# Patient Record
Sex: FEMALE | Race: WHITE | NOT HISPANIC OR LATINO | Employment: UNEMPLOYED | URBAN - METROPOLITAN AREA
[De-identification: names, ages, dates, MRNs, and addresses within clinical notes are randomized per-mention and may not be internally consistent; named-entity substitution may affect disease eponyms.]

---

## 2017-07-04 RX ORDER — SERTRALINE HYDROCHLORIDE 100 MG/1
TABLET, FILM COATED ORAL
Qty: 30 TABLET | Refills: 2 | Status: SHIPPED | OUTPATIENT
Start: 2017-07-04 | End: 2018-12-05

## 2018-12-05 ENCOUNTER — PROCEDURE VISIT (OUTPATIENT)
Dept: OBSTETRICS AND GYNECOLOGY | Facility: CLINIC | Age: 28
End: 2018-12-05

## 2018-12-05 ENCOUNTER — OFFICE VISIT (OUTPATIENT)
Dept: OBSTETRICS AND GYNECOLOGY | Facility: CLINIC | Age: 28
End: 2018-12-05

## 2018-12-05 VITALS
WEIGHT: 140 LBS | HEIGHT: 66 IN | DIASTOLIC BLOOD PRESSURE: 70 MMHG | SYSTOLIC BLOOD PRESSURE: 100 MMHG | BODY MASS INDEX: 22.5 KG/M2

## 2018-12-05 DIAGNOSIS — F32.A DEPRESSION AFFECTING PREGNANCY IN FIRST TRIMESTER, ANTEPARTUM: ICD-10-CM

## 2018-12-05 DIAGNOSIS — R55 NEAR SYNCOPE: ICD-10-CM

## 2018-12-05 DIAGNOSIS — N91.2 AMENORRHEA: Primary | ICD-10-CM

## 2018-12-05 DIAGNOSIS — F41.9 ANXIETY: ICD-10-CM

## 2018-12-05 DIAGNOSIS — O36.80X0 ENCOUNTER TO DETERMINE FETAL VIABILITY OF PREGNANCY, SINGLE OR UNSPECIFIED FETUS: Primary | ICD-10-CM

## 2018-12-05 DIAGNOSIS — Z32.01 POSITIVE URINE PREGNANCY TEST: ICD-10-CM

## 2018-12-05 DIAGNOSIS — O21.9 NAUSEA AND VOMITING DURING PREGNANCY PRIOR TO 22 WEEKS GESTATION: ICD-10-CM

## 2018-12-05 DIAGNOSIS — F19.11 H/O DRUG ABUSE (HCC): ICD-10-CM

## 2018-12-05 DIAGNOSIS — O99.341 DEPRESSION AFFECTING PREGNANCY IN FIRST TRIMESTER, ANTEPARTUM: ICD-10-CM

## 2018-12-05 PROCEDURE — 76801 OB US < 14 WKS SINGLE FETUS: CPT | Performed by: NURSE PRACTITIONER

## 2018-12-05 PROCEDURE — 99214 OFFICE O/P EST MOD 30 MIN: CPT | Performed by: NURSE PRACTITIONER

## 2018-12-05 RX ORDER — ONDANSETRON 4 MG/1
4 TABLET, ORALLY DISINTEGRATING ORAL EVERY 8 HOURS PRN
Qty: 30 TABLET | Refills: 1 | Status: SHIPPED | OUTPATIENT
Start: 2018-12-05 | End: 2019-02-19

## 2018-12-05 RX ORDER — DIPHENHYDRAMINE HYDROCHLORIDE 25 MG/1
25 CAPSULE ORAL NIGHTLY
Qty: 30 TABLET | Refills: 1 | Status: SHIPPED | OUTPATIENT
Start: 2018-12-05 | End: 2018-12-20 | Stop reason: SDUPTHER

## 2018-12-05 NOTE — PROGRESS NOTES
Confirmation of pregnancy     .  Chief Complaint   Patient presents with   • Amenorrhea   • Abdominal Pain     spotting 2 weeks ago         Delaney Duron is being seen today for evaluation of absence of menses. She is a 28 y.o. . LNMP: uncertain.  She does not remember her LNMP. She is taking PNV.   She has a history of Hep C, has not had a recent eval of her status. 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    Current obstetric complaints : nausea, breast tenderness, fatigue, dizziness, constipation  Prior obstetric issues, potential pregnancy concerns: Hep C   Current medications: PNV   Family history of genetic issues (includes FOB): denies   Prior infections concerning in pregnancy (Rash, fever in last 2 weeks): denies  Varicella Hx -as child  Flu vaccine- flu vaccine today   Last pap- needs one  Prior testing for Cystic Fibrosis Carrier or Sickle Cell Trait- She is neg for CF carrier  Prepregnancy BMI - Body mass index is 22.77 kg/m².    Past Medical History:   Diagnosis Date   • Hepatitis C        History reviewed. No pertinent surgical history.      Current Outpatient Medications:   •  acetaminophen (TYLENOL) 500 MG tablet, Take 500 mg by mouth Every 6 (Six) Hours As Needed for mild pain (1-3)., Disp: , Rfl:   •  ondansetron ODT (ZOFRAN ODT) 8 MG disintegrating tablet, Take 1 tablet by mouth Every 8 (Eight) Hours As Needed for nausea or vomiting., Disp: 10 tablet, Rfl: 0  •  ondansetron ODT (ZOFRAN-ODT) 4 MG disintegrating tablet, Take 4 mg by mouth Every 8 (Eight) Hours As Needed for nausea or vomiting., Disp: , Rfl:   •  sertraline (ZOLOFT) 100 MG tablet, take 1 tablet by mouth once daily, Disp: 30 tablet, Rfl: 2    No Known Allergies    Social History     Socioeconomic History   • Marital status: Single     Spouse name: Not on file   • Number of children: Not on file   • Years of education: Not on file   • Highest education level: Not on file   Social Needs   • Financial resource  "strain: Not on file   • Food insecurity - worry: Not on file   • Food insecurity - inability: Not on file   • Transportation needs - medical: Not on file   • Transportation needs - non-medical: Not on file   Occupational History   • Not on file   Tobacco Use   • Smoking status: Former Smoker     Packs/day: 0.50     Last attempt to quit: 2018     Years since quittin.0   • Smokeless tobacco: Never Used   Substance and Sexual Activity   • Alcohol use: No     Frequency: Never   • Drug use: Yes     Types: Heroin, Methamphetamines     Comment: 2 years clean    • Sexual activity: Yes     Partners: Male   Other Topics Concern   • Not on file   Social History Narrative   • Not on file       Family History   Problem Relation Age of Onset   • No Known Problems Father    • No Known Problems Mother        Review of systems     A comprehensive review of systems was negative except for: Constitutional: positive for fatigue  Cardiovascular: positive for near-syncope  Gastrointestinal: positive for constipation and nausea  Integument/breast: positive for breast tenderness     Objective    /70   Ht 167 cm (65.75\")   Wt 63.5 kg (140 lb)   LMP 2018 (Approximate)   BMI 22.77 kg/m²     Physical Exam   Constitutional: She is oriented to person, place, and time. She appears well-nourished.   Pulmonary/Chest: Effort normal. No respiratory distress.   Abdominal: Soft. She exhibits no distension.   Musculoskeletal: Normal range of motion.   Neurological: She is alert and oriented to person, place, and time.   Skin: Skin is warm and dry.   Psychiatric: She has a normal mood and affect. Her behavior is normal.   Vitals reviewed.      Assessment    1) Amenorrhea- + UPT in office. LNMP uncertain. US IMP: SIngle, viable IUP @ 8.4 weeks.   2) Patient's Body mass index is 22.77 kg/m². BMI is within normal parameters. No follow-up required.  3) H/O depression and anxiety- Requesting to start Zoloft. ERX zoloft 50mg.   4) H/O " drug abuse- Reports clean x 2 years.   5) Nausea- ERX Vit B6, unisom and zofran. Enc small frequent meals.   6) Constipation- ENc increased h20. Safe med list provided.   7) Junior syncope- Enc increase h20 and salt in her diet. Will ref to cardiology if symptoms continue.      Plan  Oriented to practice.  Reviewed routine and expected prenatal care.   Continue prenatal vitamins  Problem list reviewed and updated.  Zika (travel restrictions/ok to use insect repellant), not to changing cat litter, food restrictions, avoidance of alcohol, tobacco and drugs and saunas/hot tubs.     All questions answered.     Encounter Diagnoses   Name Primary?   • Amenorrhea Yes         Diagnoses and all orders for this visit:    Amenorrhea  -     POC Pregnancy, Urine      RTO in 1-2 weeks for new OB exam, labs and dating ultrasound.     Sherly France, RADHA  12/5/2018  11:14 AM

## 2018-12-20 ENCOUNTER — INITIAL PRENATAL (OUTPATIENT)
Dept: OBSTETRICS AND GYNECOLOGY | Facility: CLINIC | Age: 28
End: 2018-12-20

## 2018-12-20 VITALS — DIASTOLIC BLOOD PRESSURE: 74 MMHG | WEIGHT: 135.1 LBS | BODY MASS INDEX: 21.97 KG/M2 | SYSTOLIC BLOOD PRESSURE: 108 MMHG

## 2018-12-20 DIAGNOSIS — Z01.419 PAP SMEAR, LOW-RISK: ICD-10-CM

## 2018-12-20 DIAGNOSIS — O99.341 DEPRESSION AFFECTING PREGNANCY IN FIRST TRIMESTER, ANTEPARTUM: ICD-10-CM

## 2018-12-20 DIAGNOSIS — F32.A DEPRESSION AFFECTING PREGNANCY IN FIRST TRIMESTER, ANTEPARTUM: ICD-10-CM

## 2018-12-20 DIAGNOSIS — Z34.91 INITIAL OBSTETRIC VISIT IN FIRST TRIMESTER: Primary | ICD-10-CM

## 2018-12-20 DIAGNOSIS — F19.11 H/O DRUG ABUSE (HCC): ICD-10-CM

## 2018-12-20 DIAGNOSIS — Z11.51 SPECIAL SCREENING EXAMINATION FOR HUMAN PAPILLOMAVIRUS (HPV): ICD-10-CM

## 2018-12-20 DIAGNOSIS — O21.9 NAUSEA AND VOMITING DURING PREGNANCY PRIOR TO 22 WEEKS GESTATION: ICD-10-CM

## 2018-12-20 DIAGNOSIS — F41.9 ANXIETY: ICD-10-CM

## 2018-12-20 DIAGNOSIS — Z36.9 ENCOUNTER FOR ANTENATAL SCREENING, UNSPECIFIED: ICD-10-CM

## 2018-12-20 PROCEDURE — 99214 OFFICE O/P EST MOD 30 MIN: CPT | Performed by: NURSE PRACTITIONER

## 2018-12-20 RX ORDER — DIPHENHYDRAMINE HYDROCHLORIDE 25 MG/1
25 CAPSULE ORAL 2 TIMES DAILY
Qty: 60 TABLET | Refills: 1 | Status: SHIPPED | OUTPATIENT
Start: 2018-12-20 | End: 2019-02-19

## 2018-12-23 LAB
C TRACH RRNA CVX QL NAA+PROBE: NEGATIVE
CONV .: NORMAL
CYTOLOGIST CVX/VAG CYTO: NORMAL
CYTOLOGY CVX/VAG DOC THIN PREP: NORMAL
DX ICD CODE: NORMAL
HIV 1 & 2 AB SER-IMP: NORMAL
N GONORRHOEA RRNA CVX QL NAA+PROBE: NEGATIVE
OTHER STN SPEC: NORMAL
PATH REPORT.FINAL DX SPEC: NORMAL
STAT OF ADQ CVX/VAG CYTO-IMP: NORMAL
T VAGINALIS RRNA SPEC QL NAA+PROBE: NEGATIVE

## 2018-12-26 ENCOUNTER — LAB (OUTPATIENT)
Dept: LAB | Facility: HOSPITAL | Age: 28
End: 2018-12-26

## 2018-12-26 DIAGNOSIS — Z36.9 ENCOUNTER FOR ANTENATAL SCREENING, UNSPECIFIED: ICD-10-CM

## 2018-12-26 PROBLEM — Z11.51 SPECIAL SCREENING EXAMINATION FOR HUMAN PAPILLOMAVIRUS (HPV): Status: ACTIVE | Noted: 2018-12-26

## 2018-12-26 PROBLEM — Z34.91 INITIAL OBSTETRIC VISIT IN FIRST TRIMESTER: Status: ACTIVE | Noted: 2018-12-26

## 2018-12-26 PROBLEM — Z01.419 PAP SMEAR, LOW-RISK: Status: ACTIVE | Noted: 2018-12-26

## 2018-12-26 LAB
ABO GROUP BLD: NORMAL
ABO GROUP BLD: NORMAL
BASOPHILS # BLD AUTO: 0.04 10*3/MM3 (ref 0–0.2)
BASOPHILS NFR BLD AUTO: 0.5 % (ref 0–2)
BLD GP AB SCN SERPL QL: NEGATIVE
DEPRECATED RDW RBC AUTO: 42.9 FL (ref 37–54)
EOSINOPHIL # BLD AUTO: 0.11 10*3/MM3 (ref 0.1–0.3)
EOSINOPHIL NFR BLD AUTO: 1.4 % (ref 0–4)
ERYTHROCYTE [DISTWIDTH] IN BLOOD BY AUTOMATED COUNT: 13.7 % (ref 11.5–14.5)
HBA1C MFR BLD: 5.1 % (ref 4.8–5.6)
HCT VFR BLD AUTO: 40.2 % (ref 37–47)
HGB BLD-MCNC: 13.3 G/DL (ref 12–16)
IMM GRANULOCYTES # BLD AUTO: 0.01 10*3/MM3 (ref 0–0.03)
IMM GRANULOCYTES NFR BLD AUTO: 0.1 % (ref 0–0.5)
LYMPHOCYTES # BLD AUTO: 2.35 10*3/MM3 (ref 0.6–4.8)
LYMPHOCYTES NFR BLD AUTO: 30.6 % (ref 20–45)
MCH RBC QN AUTO: 28.4 PG (ref 27–31)
MCHC RBC AUTO-ENTMCNC: 33.1 G/DL (ref 31–37)
MCV RBC AUTO: 85.9 FL (ref 81–99)
MONOCYTES # BLD AUTO: 0.49 10*3/MM3 (ref 0–1)
MONOCYTES NFR BLD AUTO: 6.4 % (ref 3–8)
NEUTROPHILS # BLD AUTO: 4.68 10*3/MM3 (ref 1.5–8.3)
NEUTROPHILS NFR BLD AUTO: 61 % (ref 45–70)
NRBC BLD AUTO-RTO: 0 /100 WBC (ref 0–0)
PLATELET # BLD AUTO: 235 10*3/MM3 (ref 140–500)
PMV BLD AUTO: 11.3 FL (ref 7.4–10.4)
RBC # BLD AUTO: 4.68 10*6/MM3 (ref 4.2–5.4)
RH BLD: POSITIVE
RH BLD: POSITIVE
T&S EXPIRATION DATE: NORMAL
WBC NRBC COR # BLD: 7.68 10*3/MM3 (ref 4.8–10.8)

## 2018-12-26 PROCEDURE — 86803 HEPATITIS C AB TEST: CPT | Performed by: NURSE PRACTITIONER

## 2018-12-26 PROCEDURE — 80081 OBSTETRIC PANEL INC HIV TSTG: CPT | Performed by: NURSE PRACTITIONER

## 2018-12-26 PROCEDURE — 86900 BLOOD TYPING SEROLOGIC ABO: CPT

## 2018-12-26 PROCEDURE — 83036 HEMOGLOBIN GLYCOSYLATED A1C: CPT

## 2018-12-26 PROCEDURE — 86901 BLOOD TYPING SEROLOGIC RH(D): CPT

## 2018-12-26 PROCEDURE — 36415 COLL VENOUS BLD VENIPUNCTURE: CPT

## 2018-12-26 NOTE — PROGRESS NOTES
Initial ob visit     Chief Complaint   Patient presents with   • Initial Prenatal Visit       Delaney Duron is being seen today for her first obstetrical visit.  She is a 28 y.o.    11w4d gestation.     #: 1, Date: None, Sex: None, Weight: None, GA: None, Delivery: None, Apgar1: None, Apgar5: None, Living: None, Birth Comments: None    #: 2, Date: None, Sex: None, Weight: None, GA: None, Delivery: None, Apgar1: None, Apgar5: None, Living: None, Birth Comments: None    #: 3, Date: None, Sex: None, Weight: None, GA: None, Delivery: None, Apgar1: None, Apgar5: None, Living: None, Birth Comments: None    Current obstetric complaints : nausea  Prior obstetric issues, potential pregnancy concerns: Hep C   Current medications: PNV   Family history of genetic issues (includes FOB): denies   Prior infections concerning in pregnancy (Rash, fever in last 2 weeks): denies  Varicella Hx -as child  Flu vaccine- flu vaccine completed  Last pap- needs one  Prior testing for Cystic Fibrosis Carrier or Sickle Cell Trait- She is neg for CF carrier  Prepregnancy BMI: Body mass index is 21.97 kg/m².    Past Medical History:   Diagnosis Date   • Hepatitis C        No past surgical history on file.      Current Outpatient Medications:   •  acetaminophen (TYLENOL) 500 MG tablet, Take 500 mg by mouth Every 6 (Six) Hours As Needed for mild pain (1-3)., Disp: , Rfl:   •  doxylamine (UNISOM) 25 MG tablet, Take 1 tablet by mouth At Night As Needed for Sleep., Disp: 30 tablet, Rfl: 1  •  ondansetron ODT (ZOFRAN-ODT) 4 MG disintegrating tablet, Take 1 tablet by mouth Every 8 (Eight) Hours As Needed for Nausea or Vomiting., Disp: 30 tablet, Rfl: 1  •  sertraline (ZOLOFT) 50 MG tablet, Take 1 tablet by mouth Daily., Disp: 30 tablet, Rfl: 2  •  vitamin B-6 (PYRIDOXINE) 25 MG tablet, Take 1 tablet by mouth 2 (Two) Times a Day., Disp: 60 tablet, Rfl: 1    No Known Allergies    Social History     Socioeconomic History   • Marital status: Single      Spouse name: Not on file   • Number of children: Not on file   • Years of education: Not on file   • Highest education level: Not on file   Social Needs   • Financial resource strain: Not on file   • Food insecurity - worry: Not on file   • Food insecurity - inability: Not on file   • Transportation needs - medical: Not on file   • Transportation needs - non-medical: Not on file   Occupational History   • Not on file   Tobacco Use   • Smoking status: Former Smoker     Packs/day: 0.50     Last attempt to quit: 2018     Years since quittin.0   • Smokeless tobacco: Never Used   Substance and Sexual Activity   • Alcohol use: No     Frequency: Never   • Drug use: Yes     Types: Heroin, Methamphetamines     Comment: 2 years clean    • Sexual activity: Yes     Partners: Male   Other Topics Concern   • Not on file   Social History Narrative   • Not on file       Family History   Problem Relation Age of Onset   • No Known Problems Father    • No Known Problems Mother        Review of systems     A comprehensive review of systems was negative except for: Gastrointestinal: positive for nausea     Objective    /74   Wt 61.3 kg (135 lb 1.6 oz)   LMP 2018 (Approximate)   BMI 21.97 kg/m²       General Appearance:    Alert, cooperative, in no acute distress, habitus normal   Head:    Not examined   Eyes:           Not examined   Ears:  Not examined       Neck:  No thyroid enlargement or nodules present   Back:     No kyphosis present, no scoliosis present,                       Lungs:     Clear to auscultation,respirations regular, even and                   unlabored    Heart:    Regular rhythm and normal rate, normal S1 and S2, no            murmur, no gallop, no rub, no click   Breast Exam:    No masses, No nipple discharge   Abdomen:     Normal bowel sounds, no masses, no organomegaly, soft        non-tender, non-distended, no guarding, no rebound                 tenderness   Genitalia:    Vulva - No  masses, no atrophy, no lesions    Vagina - No discharge, No bleeding    Cervix - No Lesions, closed. Pap collected.      Uterus - Consistent with 10 weeks.     Adnexa - No masses, non tender       Extremities:   Moves all extremities well, no edema, no cyanosis, no              redness   Pulses:   Pulses palpable and equal bilaterally   Skin:   No bleeding, bruising or rash   Lymph nodes:   No palpable adenopathy   Neurologic:   Sensation intact, A&O times 3      Assessment    1) Pregnancy at 10w5d   2) Patient's Body mass index is 21.97 kg/m². BMI is within normal parameters. No follow-up required.  3) S/P flu vaccine  4) H/O Drug abuse- Denies use at this time. Await UDS.  5) Nausea- Enc small frequent meals, vit b6, betsey and unisom   6) H/O depression and anxiety- Taking Zoloft 50mg          Plan  New OB exam completed. New OB bag provided.   Initial labs collected. Patient desires [id number]. Declines  cystic fibrosis screening.    Continue taking Prenatal vitamins  Problem list reviewed and updated  Reviewed routine prenatal care with the office to include but not limited to expected weight gain during pregnancy, Tylenol products are fine, avoid aspirin and ibuprofen; Zika (travel restrictions/ok to use insect repellant); not to change cat litter; food restrictions; avoidance of alcohol, tobacco, drugs and saunas/hot tubs.   All questions answered.     RTO 4 weeks     Sherly France, APRN  120/20/2018

## 2018-12-27 LAB
BACTERIA UR CULT: NORMAL
BACTERIA UR CULT: NORMAL
DRUGS UR: NORMAL
HBV SURFACE AG SERPL QL IA: NEGATIVE
HCV AB S/CO SERPL IA: >11 S/CO RATIO (ref 0–0.9)
HIV 1+2 AB+HIV1 P24 AG SERPL QL IA: NON REACTIVE
RPR SER QL: NON REACTIVE
RUBV IGG SERPL IA-ACNC: 6.68 INDEX

## 2019-01-22 ENCOUNTER — ROUTINE PRENATAL (OUTPATIENT)
Dept: OBSTETRICS AND GYNECOLOGY | Facility: CLINIC | Age: 29
End: 2019-01-22

## 2019-01-22 VITALS — WEIGHT: 135 LBS | BODY MASS INDEX: 21.96 KG/M2 | DIASTOLIC BLOOD PRESSURE: 60 MMHG | SYSTOLIC BLOOD PRESSURE: 98 MMHG

## 2019-01-22 DIAGNOSIS — Z36.9 ENCOUNTER FOR ANTENATAL SCREENING, UNSPECIFIED: Primary | ICD-10-CM

## 2019-01-22 PROBLEM — F17.200 SMOKER: Status: ACTIVE | Noted: 2019-01-22

## 2019-01-22 PROCEDURE — 99406 BEHAV CHNG SMOKING 3-10 MIN: CPT | Performed by: OBSTETRICS & GYNECOLOGY

## 2019-01-22 PROCEDURE — 99213 OFFICE O/P EST LOW 20 MIN: CPT | Performed by: OBSTETRICS & GYNECOLOGY

## 2019-01-22 NOTE — PROGRESS NOTES
S:    Pt here for ob office visit.    history:  HPI:Delaney Duron is a 28 y.o.  15w3d being seen today for her obstetrical visit.   Patient reports no complaints . Fetal movement: normal. .        ROS: Pt denies visual changes, headaches, shortness of breath, chest pain, esophageal reflux, gastric pain, nausea and vomiting, diarrhea, rashes, vaginal bleeding, edema, hip pain, pelvic pressure.     PFSH:   Past Medical History:   Diagnosis Date   • Hepatitis C        SMOKER? Yes  Counseling given: Not Answered  .  I advised the patient of the risks in continuing to use tobacco, and I provided this patient with smoking cessation educational materials.  I also discussed how to quit smoking and the patient has expressed the willingness to quit.    During this visit, I spent > 3-10 minutes counseling the patient regarding smoking cessation.     I advised Delaney of the risks of continuing to use tobacco, and I provided her with tobacco cessation educational materials in the After Visit Summary.             PT COUNSELED TO QUIT SMOKING IN PREGNANCY.  (Cigarette smoking is associated with increased incidence of IUGR, PROM, PTL, PTD, SIDS, asthma, and ear infections.  . Smoking cessation is the single most important thing she can do to improve the pregnancy outcome.)       ALCOHOL? no  ILLICIT DRUGS? no      O:  BP 98/60   Wt 61.2 kg (135 lb)   LMP 2018 (Approximate)   BMI 21.96 kg/m² , additional findings in addition to above flow sheet: none      A:  MEDICAL DECISION MAKING:   DIAGNOSES:  28 y.o.  15w3d  Patient Active Problem List   Diagnosis   • Amenorrhea   • Positive urine pregnancy test   • Nausea and vomiting during pregnancy prior to 22 weeks gestation   • Anxiety   • Depression affecting pregnancy in first trimester, antepartum   • H/O drug abuse   • Near syncope   • Initial obstetric visit in first trimester   • Pap smear, low-risk   • Special screening examination for human papillomavirus (HPV)    • Smoker     NEW PROBLEMS? None.  Pt desires AFP after counseling.     Data Review: UA  ANT? no  Lab Results (last 24 hours)     ** No results found for the last 24 hours. **          There are no diagnoses linked to this encounter.  Pregnancy Assessment : High Risk Pregnancy smoker      P:  Tests ordered for this or next visit: LABS: afp  New Meds: yes  Prenatal vitamins  Return in about 4 weeks (around 2/19/2019) for ob tummy.    Isma Yepez MD

## 2019-01-24 LAB
AFP ADJ MOM SERPL: 0.77
AFP INTERP SERPL-IMP: NORMAL
AFP INTERP SERPL-IMP: NORMAL
AFP SERPL-MCNC: 23.8 NG/ML
AGE AT DELIVERY: 26.7 YR
GA METHOD: NORMAL
GA: 15.4 WEEKS
IDDM PATIENT QL: NO
LABORATORY COMMENT REPORT: NORMAL
MULTIPLE PREGNANCY: NO
NEURAL TUBE DEFECT RISK FETUS: NORMAL %
RESULT: NORMAL

## 2019-02-18 LAB
2ND TRIMESTER 4 SCREEN SERPL-IMP: NORMAL
EXTERNAL AMPHETAMINE SCREEN URINE: NEGATIVE
EXTERNAL NIPT: NORMAL

## 2019-02-19 ENCOUNTER — ROUTINE PRENATAL (OUTPATIENT)
Dept: OBSTETRICS AND GYNECOLOGY | Facility: CLINIC | Age: 29
End: 2019-02-19

## 2019-02-19 ENCOUNTER — PROCEDURE VISIT (OUTPATIENT)
Dept: OBSTETRICS AND GYNECOLOGY | Facility: CLINIC | Age: 29
End: 2019-02-19

## 2019-02-19 VITALS — WEIGHT: 140 LBS | BODY MASS INDEX: 22.77 KG/M2 | SYSTOLIC BLOOD PRESSURE: 100 MMHG | DIASTOLIC BLOOD PRESSURE: 60 MMHG

## 2019-02-19 DIAGNOSIS — F17.200 SMOKER: ICD-10-CM

## 2019-02-19 DIAGNOSIS — Z36.3 ANTENATAL SCREENING FOR MALFORMATION USING ULTRASONICS: Primary | ICD-10-CM

## 2019-02-19 DIAGNOSIS — O09.92 HIGH-RISK PREGNANCY IN SECOND TRIMESTER: Primary | ICD-10-CM

## 2019-02-19 DIAGNOSIS — R76.8 HCV ANTIBODY POSITIVE: ICD-10-CM

## 2019-02-19 DIAGNOSIS — B18.2 HEPATITIS C VIRUS CARRIER STATE (HCC): ICD-10-CM

## 2019-02-19 PROCEDURE — 76805 OB US >/= 14 WKS SNGL FETUS: CPT | Performed by: OBSTETRICS & GYNECOLOGY

## 2019-02-19 PROCEDURE — 99213 OFFICE O/P EST LOW 20 MIN: CPT | Performed by: OBSTETRICS & GYNECOLOGY

## 2019-02-19 NOTE — PROGRESS NOTES
OB follow up     Delaney Duron is a 28 y.o.  19w3d being seen today for her obstetrical visit.  Patient reports no bleeding, no contractions and no leaking. Fetal movement: normal.  Patient is here for her 20-week anatomy scan.  She also had a positive hep C virus antibody with no RNA follow-up.    Review of Systems  No bleeding, No cramping/contractions     /60   Wt 63.5 kg (140 lb)   LMP 2018 (Approximate)   BMI 22.77 kg/m²     FHT: present BPM   Uterine Size: 19 cm   Ultrasound shows a live viable female fetus in the transverse position.  Growth is in the 52nd percentile.  Anatomical survey is normal and JESSICA is normal.    Assessment/Plan:    1) 28 y.o.  -pregnancy at 19w3d    2)   Encounter Diagnoses   Name Primary?   • High-risk pregnancy in second trimester Yes   • Smoker    • Hepatitis C virus carrier state (CMS/HCC)    • HCV antibody positive-RNA drawn 19    Normal anatomical survey.  Draw hep C virus RNA for viral load.    3) Reviewed this stage of pregnancy  4) Problem list updated     Return in about 4 weeks (around 3/19/2019) for OB Tiff.      Scottie Cooley MD    2019  9:51 AM

## 2019-02-21 LAB
HCV RNA SERPL NAA+PROBE-ACNC: NORMAL IU/ML
HCV RNA SERPL NAA+PROBE-LOG IU: 5.25 LOG10 IU/ML
TEST INFORMATION: NORMAL

## 2019-03-27 ENCOUNTER — ROUTINE PRENATAL (OUTPATIENT)
Dept: OBSTETRICS AND GYNECOLOGY | Facility: CLINIC | Age: 29
End: 2019-03-27

## 2019-03-27 VITALS — WEIGHT: 152 LBS | DIASTOLIC BLOOD PRESSURE: 76 MMHG | SYSTOLIC BLOOD PRESSURE: 120 MMHG | BODY MASS INDEX: 24.72 KG/M2

## 2019-03-27 DIAGNOSIS — R76.8 HCV ANTIBODY POSITIVE: ICD-10-CM

## 2019-03-27 DIAGNOSIS — O99.341 DEPRESSION AFFECTING PREGNANCY IN FIRST TRIMESTER, ANTEPARTUM: ICD-10-CM

## 2019-03-27 DIAGNOSIS — F32.A DEPRESSION AFFECTING PREGNANCY IN FIRST TRIMESTER, ANTEPARTUM: ICD-10-CM

## 2019-03-27 DIAGNOSIS — O09.92 HIGH-RISK PREGNANCY IN SECOND TRIMESTER: Primary | ICD-10-CM

## 2019-03-27 PROCEDURE — 99214 OFFICE O/P EST MOD 30 MIN: CPT | Performed by: OBSTETRICS & GYNECOLOGY

## 2019-03-27 NOTE — PROGRESS NOTES
OB follow up     Delaney Duron is a 28 y.o.  24w4d being seen today for her obstetrical visit.  Patient reports no bleeding, no contractions and no leaking. Fetal movement: normal.  Prenatal care is complicated by hepatitis C carrier, history of drug abuse, depression.  She is still having light headed spells on and off.  She has not passed out.    Review of Systems  No bleeding, No cramping/contractions     /76   Wt 68.9 kg (152 lb)   LMP 2018 (Approximate)   BMI 24.72 kg/m²     FHT: present BPM   Uterine Size: 24 cm       Assessment/Plan:    1) 28 y.o.  -pregnancy at 24w4d    2)   Encounter Diagnoses   Name Primary?   • High-risk pregnancy in second trimester Yes   • HCV antibody positive - RNA +    • Depression affecting pregnancy in first trimester, antepartum    Continue Zoloft for the depression.  Plan 2-hour GTT at next visit.  Discussed the etiology of shunting blood towards the pregnancy and how to deal with lightheadedness and pregnancy.  Encouraged aggressive hydration daily.    3) Reviewed this stage of pregnancy  4) Problem list updated     Return in about 4 weeks (around 2019) for 2 HR GTT, OB Tiff.      Scottie Cooley MD    3/27/2019  4:36 PM

## 2019-04-25 ENCOUNTER — PROCEDURE VISIT (OUTPATIENT)
Dept: OBSTETRICS AND GYNECOLOGY | Facility: CLINIC | Age: 29
End: 2019-04-25

## 2019-04-25 ENCOUNTER — ROUTINE PRENATAL (OUTPATIENT)
Dept: OBSTETRICS AND GYNECOLOGY | Facility: CLINIC | Age: 29
End: 2019-04-25

## 2019-04-25 VITALS — BODY MASS INDEX: 25.37 KG/M2 | DIASTOLIC BLOOD PRESSURE: 64 MMHG | WEIGHT: 156 LBS | SYSTOLIC BLOOD PRESSURE: 108 MMHG

## 2019-04-25 DIAGNOSIS — F19.11 H/O DRUG ABUSE (HCC): ICD-10-CM

## 2019-04-25 DIAGNOSIS — O26.899 CRAMPING AFFECTING PREGNANCY, ANTEPARTUM: Primary | ICD-10-CM

## 2019-04-25 DIAGNOSIS — O26.843 UTERINE SIZE-DATE DISCREPANCY IN THIRD TRIMESTER: ICD-10-CM

## 2019-04-25 DIAGNOSIS — Z23 NEED FOR TDAP VACCINATION: ICD-10-CM

## 2019-04-25 DIAGNOSIS — R10.9 CRAMPING AFFECTING PREGNANCY, ANTEPARTUM: Primary | ICD-10-CM

## 2019-04-25 DIAGNOSIS — R76.8 HCV ANTIBODY POSITIVE: ICD-10-CM

## 2019-04-25 DIAGNOSIS — Z87.891 FORMER SMOKER: ICD-10-CM

## 2019-04-25 DIAGNOSIS — O09.93 HIGH-RISK PREGNANCY IN THIRD TRIMESTER: ICD-10-CM

## 2019-04-25 DIAGNOSIS — F32.A DEPRESSION AFFECTING PREGNANCY: ICD-10-CM

## 2019-04-25 DIAGNOSIS — B18.2 HEPATITIS C VIRUS CARRIER STATE (HCC): Primary | ICD-10-CM

## 2019-04-25 DIAGNOSIS — O26.843 FUNDAL HEIGHT LOW FOR DATES IN THIRD TRIMESTER: ICD-10-CM

## 2019-04-25 DIAGNOSIS — O99.340 DEPRESSION AFFECTING PREGNANCY: ICD-10-CM

## 2019-04-25 DIAGNOSIS — Z36.9 ENCOUNTER FOR ANTENATAL SCREENING, UNSPECIFIED: ICD-10-CM

## 2019-04-25 DIAGNOSIS — F41.9 ANXIETY: ICD-10-CM

## 2019-04-25 PROBLEM — Z32.01 POSITIVE URINE PREGNANCY TEST: Status: RESOLVED | Noted: 2018-12-05 | Resolved: 2019-04-25

## 2019-04-25 PROBLEM — Z11.51 SPECIAL SCREENING EXAMINATION FOR HUMAN PAPILLOMAVIRUS (HPV): Status: RESOLVED | Noted: 2018-12-26 | Resolved: 2019-04-25

## 2019-04-25 PROBLEM — Z01.419 PAP SMEAR, LOW-RISK: Status: RESOLVED | Noted: 2018-12-26 | Resolved: 2019-04-25

## 2019-04-25 PROBLEM — Z34.91 INITIAL OBSTETRIC VISIT IN FIRST TRIMESTER: Status: RESOLVED | Noted: 2018-12-26 | Resolved: 2019-04-25

## 2019-04-25 PROBLEM — N91.2 AMENORRHEA: Status: RESOLVED | Noted: 2018-12-05 | Resolved: 2019-04-25

## 2019-04-25 LAB
GLUCOSE 1H P 75 G GLC PO SERPL-MCNC: 213 MG/DL (ref 40–300)
GLUCOSE 2H P 75 G GLC PO SERPL-MCNC: 141 MG/DL (ref 40–300)
GLUCOSE P FAST SERPL-MCNC: 77 MG/DL (ref 40–300)
HCT VFR BLD AUTO: 35.6 % (ref 34–46.6)
HGB BLD-MCNC: 11.5 G/DL (ref 12–15.9)

## 2019-04-25 PROCEDURE — 90715 TDAP VACCINE 7 YRS/> IM: CPT | Performed by: OBSTETRICS & GYNECOLOGY

## 2019-04-25 PROCEDURE — 90471 IMMUNIZATION ADMIN: CPT | Performed by: OBSTETRICS & GYNECOLOGY

## 2019-04-25 PROCEDURE — 76816 OB US FOLLOW-UP PER FETUS: CPT | Performed by: OBSTETRICS & GYNECOLOGY

## 2019-04-25 PROCEDURE — 76817 TRANSVAGINAL US OBSTETRIC: CPT | Performed by: OBSTETRICS & GYNECOLOGY

## 2019-04-25 PROCEDURE — 99214 OFFICE O/P EST MOD 30 MIN: CPT | Performed by: OBSTETRICS & GYNECOLOGY

## 2019-04-25 RX ORDER — SERTRALINE HYDROCHLORIDE 100 MG/1
100 TABLET, FILM COATED ORAL DAILY
Qty: 30 TABLET | Refills: 4 | Status: SHIPPED | OUTPATIENT
Start: 2019-04-25 | End: 2019-09-10

## 2019-04-25 NOTE — PROGRESS NOTES
OB follow up     Delaney Duron is a 28 y.o.  28w5d being seen today for her obstetrical visit.  Patient reports zoloft is helping less each week. She is interested in increasing her dose. She also feels vaginal pressure and cramping Fetal movement: normal.    Review of Systems  No bleeding, has BH and pressure    /64   Wt 70.8 kg (156 lb)   LMP 2018 (Approximate)   BMI 25.37 kg/m²     FHT: 160  BPM   Uterine Size: 25  cm       Assessment/Plan:    1) 28 y.o.  -pregnancy at 28w5d- cramping and S<D- US today shows growth is 34%, JESSICA 10.7 cm, good interval growth since US on 19. CL = 3.4 cm. Enc pt to wear a pregnancy belt.     2) Former smoker- pt still quit.     3) 2 hour GTT today. Rh +    4) Tdap today. Patient advised to have the Tdap shot in the alter part of pregnancy to help protect against whooping cough.  Also advised that FOB and other adults who come in contact with the infant also be vaccinated with Tdap.      5)Depression- increase Zoloft 10 100 mg QD.     6)Hep C + - check RNA and CMP    7)G and P corrected, pt has had 2 vaginal deliveries at term, 1 SAB and 1 VIP.    8)Reviewed this stage of pregnancy    9)Problem list updated     10) RTO 2 weeks OBT      Maricruz Mendez MD    2019  10:04 AM

## 2019-04-26 ENCOUNTER — TELEPHONE (OUTPATIENT)
Dept: OBSTETRICS AND GYNECOLOGY | Facility: CLINIC | Age: 29
End: 2019-04-26

## 2019-04-26 DIAGNOSIS — O24.419 GESTATIONAL DIABETES MELLITUS (GDM), ANTEPARTUM, GESTATIONAL DIABETES METHOD OF CONTROL UNSPECIFIED: Primary | ICD-10-CM

## 2019-04-26 PROBLEM — D64.9 ANEMIA: Status: ACTIVE | Noted: 2019-04-26

## 2019-04-26 RX ORDER — FERROUS SULFATE 325(65) MG
325 TABLET ORAL
Qty: 30 TABLET | Refills: 6 | Status: SHIPPED | OUTPATIENT
Start: 2019-04-26 | End: 2019-07-21

## 2019-04-26 RX ORDER — LANCETS 30 GAUGE
1 EACH MISCELLANEOUS 4 TIMES DAILY
Qty: 200 EACH | Refills: 4 | Status: SHIPPED | OUTPATIENT
Start: 2019-04-26 | End: 2019-09-10

## 2019-04-26 RX ORDER — BLOOD-GLUCOSE METER
1 KIT MISCELLANEOUS AS NEEDED
Qty: 1 EACH | Refills: 0 | Status: SHIPPED | OUTPATIENT
Start: 2019-04-26 | End: 2019-09-10

## 2019-04-26 NOTE — TELEPHONE ENCOUNTER
I wrote it out it on a paper script since it was already ordered as a generic and they will not make the substitution. Thanks RICH

## 2019-04-26 NOTE — TELEPHONE ENCOUNTER
Received fax from pharmacy requesting an alternative to patients Freestyle lite diabetic supplies. Please advise

## 2019-04-26 NOTE — PROGRESS NOTES
PIP= pt has an abnormal 2 hour GTT, indicated gestational DM. She needs to see DM education, check blood sugars fasting and 2 hours after every meal and bring her log with her to each appt. Her supplies have been called into the pharmacy. She also has anemia and needs daily iron

## 2019-04-26 NOTE — TELEPHONE ENCOUNTER
Spoke with Leonora at [facility] and the [facility] [employer] testing supplies. They would not allow me to call in a verbal due to medicaid regulations.They also said that the test strips will need specific directions. It can not be use as directed. Let me know if there's anything else I can do to help.

## 2019-04-29 LAB
ALBUMIN SERPL-MCNC: 3.6 G/DL (ref 3.5–5.2)
ALBUMIN/GLOB SERPL: 1.2 G/DL
ALP SERPL-CCNC: 73 U/L (ref 39–117)
ALT SERPL-CCNC: 12 U/L (ref 1–33)
AST SERPL-CCNC: 19 U/L (ref 1–32)
BILIRUB SERPL-MCNC: <0.2 MG/DL (ref 0.2–1.2)
BUN SERPL-MCNC: 6 MG/DL (ref 6–20)
BUN/CREAT SERPL: 9.5 (ref 7–25)
CALCIUM SERPL-MCNC: 9 MG/DL (ref 8.6–10.5)
CHLORIDE SERPL-SCNC: 103 MMOL/L (ref 98–107)
CO2 SERPL-SCNC: 21.6 MMOL/L (ref 22–29)
CREAT SERPL-MCNC: 0.63 MG/DL (ref 0.57–1)
GLOBULIN SER CALC-MCNC: 3 GM/DL
GLUCOSE SERPL-MCNC: 215 MG/DL (ref 65–99)
HCV GENTYP SERPL NAA+PROBE: 3
HCV GENTYP SERPL NAA+PROBE: NORMAL
HCV RNA SERPL NAA+PROBE-ACNC: NORMAL IU/ML
HCV RNA SERPL NAA+PROBE-LOG IU: 5.08 LOG10 IU/ML
LABORATORY COMMENT REPORT: NORMAL
POTASSIUM SERPL-SCNC: 3.8 MMOL/L (ref 3.5–5.2)
PROT SERPL-MCNC: 6.6 G/DL (ref 6–8.5)
REF LAB TEST REF RANGE: NORMAL
SODIUM SERPL-SCNC: 136 MMOL/L (ref 136–145)

## 2019-05-01 ENCOUNTER — HOSPITAL ENCOUNTER (OUTPATIENT)
Dept: DIABETES SERVICES | Facility: HOSPITAL | Age: 29
Discharge: HOME | End: 2019-05-01
Admitting: OBSTETRICS & GYNECOLOGY

## 2019-05-01 PROCEDURE — G0108 DIAB MANAGE TRN  PER INDIV: HCPCS

## 2019-05-08 ENCOUNTER — ROUTINE PRENATAL (OUTPATIENT)
Dept: OBSTETRICS AND GYNECOLOGY | Facility: CLINIC | Age: 29
End: 2019-05-08

## 2019-05-08 VITALS — BODY MASS INDEX: 25.37 KG/M2 | DIASTOLIC BLOOD PRESSURE: 62 MMHG | SYSTOLIC BLOOD PRESSURE: 110 MMHG | WEIGHT: 156 LBS

## 2019-05-08 DIAGNOSIS — R76.8 HCV ANTIBODY POSITIVE: ICD-10-CM

## 2019-05-08 DIAGNOSIS — O24.410 DIET CONTROLLED GESTATIONAL DIABETES MELLITUS (GDM) IN THIRD TRIMESTER: ICD-10-CM

## 2019-05-08 DIAGNOSIS — O09.93 HIGH-RISK PREGNANCY IN THIRD TRIMESTER: Primary | ICD-10-CM

## 2019-05-08 PROCEDURE — 99214 OFFICE O/P EST MOD 30 MIN: CPT | Performed by: OBSTETRICS & GYNECOLOGY

## 2019-05-08 RX ORDER — BLOOD-GLUCOSE METER
KIT MISCELLANEOUS
Refills: 0 | COMMUNITY
Start: 2019-04-28 | End: 2019-09-10

## 2019-05-08 NOTE — PROGRESS NOTES
OB follow up     Delaney Duron is a 28 y.o.  30w4d being seen today for her obstetrical visit.  Patient reports no bleeding, no contractions and no leaking. Fetal movement: normal.  Patient's prenatal care is complicated by a new diagnosis of gestational diabetes.  In addition the patient has a history of drug abuse and has contracted hepatitis C.  She is a former smoker and has anxiety.  She presents today to go over her first glucose log after diabetic education.  She has been eating well and thinks she is doing quite well on her blood sugars.  She reports active fetal movement no vaginal bleeding and no loss of fluid.  She denies any dizziness or signs of hypoglycemia.    Review of Systems  No bleeding, No cramping/contractions     /62   Wt 70.8 kg (156 lb)   LMP 2018 (Approximate)   BMI 25.37 kg/m²     FHT: present BPM   Uterine Size: 30 cm   Blood sugar log was reviewed with the patient.  She is doing quite well controlling her fasting and 2-hour postprandials.  She has some elevated values that are not too high and knows what may have caused them.    Assessment/Plan:    1) 28 y.o.  -pregnancy at 30w4d    2)   Encounter Diagnoses   Name Primary?   • High-risk pregnancy in third trimester Yes   • HCV antibody positive - RNA +    • Diet controlled gestational diabetes mellitus (GDM) in third trimester    15 minutes was spent with the patient going over why it is important to control blood sugar during pregnancy.  Diabetic education was performed including what foods to eat what foods to avoid.  I encourage no snacking in between meals.  Plan growth ultrasound at next visit.    3) Reviewed this stage of pregnancy  4) Problem list updated     Return in about 2 weeks (around 2019) for US, Growth, OB Tiff.      Scottie Cooley MD    2019  1:50 PM

## 2019-05-22 ENCOUNTER — ROUTINE PRENATAL (OUTPATIENT)
Dept: OBSTETRICS AND GYNECOLOGY | Facility: CLINIC | Age: 29
End: 2019-05-22

## 2019-05-22 ENCOUNTER — PROCEDURE VISIT (OUTPATIENT)
Dept: OBSTETRICS AND GYNECOLOGY | Facility: CLINIC | Age: 29
End: 2019-05-22

## 2019-05-22 VITALS — BODY MASS INDEX: 26.02 KG/M2 | SYSTOLIC BLOOD PRESSURE: 100 MMHG | DIASTOLIC BLOOD PRESSURE: 62 MMHG | WEIGHT: 160 LBS

## 2019-05-22 DIAGNOSIS — O99.340 DEPRESSION AFFECTING PREGNANCY: ICD-10-CM

## 2019-05-22 DIAGNOSIS — O99.340 DEPRESSION DURING PREGNANCY, ANTEPARTUM: Primary | ICD-10-CM

## 2019-05-22 DIAGNOSIS — O24.410 DIET CONTROLLED GESTATIONAL DIABETES MELLITUS (GDM) IN THIRD TRIMESTER: Primary | ICD-10-CM

## 2019-05-22 DIAGNOSIS — F19.11 H/O DRUG ABUSE (HCC): ICD-10-CM

## 2019-05-22 DIAGNOSIS — O09.93 HIGH-RISK PREGNANCY IN THIRD TRIMESTER: ICD-10-CM

## 2019-05-22 DIAGNOSIS — R76.8 HCV ANTIBODY POSITIVE: ICD-10-CM

## 2019-05-22 DIAGNOSIS — O24.419 WHITE CLASSIFICATION A2 GESTATIONAL DIABETES MELLITUS: ICD-10-CM

## 2019-05-22 DIAGNOSIS — Z87.891 FORMER SMOKER: ICD-10-CM

## 2019-05-22 DIAGNOSIS — F32.A DEPRESSION AFFECTING PREGNANCY: ICD-10-CM

## 2019-05-22 DIAGNOSIS — F41.9 ANXIETY: ICD-10-CM

## 2019-05-22 DIAGNOSIS — F32.A DEPRESSION DURING PREGNANCY, ANTEPARTUM: Primary | ICD-10-CM

## 2019-05-22 PROBLEM — O21.9 NAUSEA AND VOMITING DURING PREGNANCY PRIOR TO 22 WEEKS GESTATION: Status: RESOLVED | Noted: 2018-12-05 | Resolved: 2019-05-22

## 2019-05-22 PROCEDURE — 76816 OB US FOLLOW-UP PER FETUS: CPT | Performed by: OBSTETRICS & GYNECOLOGY

## 2019-05-22 PROCEDURE — 99214 OFFICE O/P EST MOD 30 MIN: CPT | Performed by: OBSTETRICS & GYNECOLOGY

## 2019-05-22 RX ORDER — RANITIDINE 150 MG/1
150 CAPSULE ORAL 2 TIMES DAILY
Qty: 60 CAPSULE | Refills: 3 | Status: SHIPPED | OUTPATIENT
Start: 2019-05-22 | End: 2019-07-21

## 2019-05-22 RX ORDER — GLYBURIDE 2.5 MG/1
2.5 TABLET ORAL 2 TIMES DAILY
Qty: 30 TABLET | Refills: 6 | Status: SHIPPED | OUTPATIENT
Start: 2019-05-22 | End: 2019-07-08 | Stop reason: HOSPADM

## 2019-05-22 NOTE — PROGRESS NOTES
S:    Pt here for ob office visit & u/s for growth & glucometer check.    history:  HPI:Delaney Duron is a 28 y.o.  32w4d being seen today for her obstetrical visit.   Patient reports worsening depression and allergies. . Fetal movement: normal. .        ROS: Pt denies visual changes, headaches, shortness of breath, chest pain, esophageal reflux, gastric pain, nausea and vomiting, diarrhea, rashes, vaginal bleeding, edema, hip pain, pelvic pressure.     PFSH:   Past Medical History:   Diagnosis Date   • Gestational diabetes 2019   • Hepatitis C        SMOKER? no    ALCOHOL? no  ILLICIT DRUGS? no      O:  /62   Wt 72.6 kg (160 lb)   LMP 2018 (Approximate)   BMI 26.02 kg/m² , additional findings in addition to above flow sheet: glucometer log.  Most values elevated.  Will start glyburide bid.       A:  MEDICAL DECISION MAKING:   DIAGNOSES:  28 y.o.  32w4d  Patient Active Problem List   Diagnosis   • Anxiety   • Depression affecting pregnancy- on zoloft 100 mg   • H/O drug abuse- clean x 2 years, UDS neg   • Near syncope   • Former smoker   • High-risk pregnancy in third trimester   • HCV antibody positive - RNA +   • White classification A2 gestational diabetes mellitus   • Anemia     NEW PROBLEMS? GERD.  allergies    Data Review: UA & log  ANT? no  Lab Results (last 24 hours)     ** No results found for the last 24 hours. **          Delaney was seen today for routine prenatal visit.    Diagnoses and all orders for this visit:    Depression during pregnancy, antepartum  -     Ambulatory Referral to Psychiatry    High-risk pregnancy in third trimester    HCV antibody positive - RNA +    Former smoker    H/O drug abuse- clean x 2 years, UDS neg    White classification A2 gestational diabetes mellitus    Anxiety    Depression affecting pregnancy- on zoloft 100 mg    Other orders  -     ranitidine (ZANTAC) 150 MG capsule; Take 1 capsule by mouth 2 (Two) Times a Day.  -     glyBURIDE  (DIAbeta) 2.5 MG tablet; Take 1 tablet by mouth 2 (Two) Times a Day.      Pregnancy Assessment : High Risk Pregnancy depression, hx drug abuse, hep c, anxiety, A2DM      P:  Tests ordered for this or next visit: NONE   New Meds: yes  Glyburide, zantac.  Needs psych consult for worsening depression refractory to medication.     Start weekly ANT   Return in about 2 weeks (around 6/5/2019) for ob tummy.    Isma Yepez MD

## 2019-05-25 ENCOUNTER — HOSPITAL ENCOUNTER (OUTPATIENT)
Facility: HOSPITAL | Age: 29
Discharge: HOME | End: 2019-05-26
Attending: OBSTETRICS & GYNECOLOGY | Admitting: OBSTETRICS & GYNECOLOGY

## 2019-05-25 LAB
AMPHET+METHAMPHET UR QL: NEGATIVE
AMPHETAMINES UR QL: NEGATIVE
BACTERIA UR QL AUTO: ABNORMAL /HPF
BARBITURATES UR QL SCN: NEGATIVE
BENZODIAZ UR QL SCN: NEGATIVE
BILIRUB UR QL STRIP: NEGATIVE
BUPRENORPHINE SERPL-MCNC: NEGATIVE NG/ML
CANNABINOIDS SERPL QL: NEGATIVE
CLARITY UR: CLEAR
COCAINE UR QL: NEGATIVE
COLOR UR: YELLOW
GLUCOSE UR STRIP-MCNC: NEGATIVE MG/DL
HGB UR QL STRIP.AUTO: ABNORMAL
HYALINE CASTS UR QL AUTO: ABNORMAL /LPF
KETONES UR QL STRIP: NEGATIVE
LEUKOCYTE ESTERASE UR QL STRIP.AUTO: ABNORMAL
METHADONE UR QL SCN: NEGATIVE
NITRITE UR QL STRIP: NEGATIVE
OPIATES UR QL: NEGATIVE
OXYCODONE UR QL SCN: NEGATIVE
PCP UR QL SCN: NEGATIVE
PH UR STRIP.AUTO: 6.5 [PH] (ref 4.5–8)
PROPOXYPH UR QL: NEGATIVE
PROT UR QL STRIP: NEGATIVE
RBC # UR: ABNORMAL /HPF
REF LAB TEST METHOD: ABNORMAL
SP GR UR STRIP: 1.02 (ref 1–1.03)
SQUAMOUS #/AREA URNS HPF: ABNORMAL /HPF
TRICYCLICS UR QL SCN: NEGATIVE
UROBILINOGEN UR QL STRIP: ABNORMAL
WBC UR QL AUTO: ABNORMAL /HPF

## 2019-05-25 PROCEDURE — 80306 DRUG TEST PRSMV INSTRMNT: CPT | Performed by: OBSTETRICS & GYNECOLOGY

## 2019-05-25 PROCEDURE — 59025 FETAL NON-STRESS TEST: CPT

## 2019-05-25 PROCEDURE — 81001 URINALYSIS AUTO W/SCOPE: CPT | Performed by: OBSTETRICS & GYNECOLOGY

## 2019-05-25 PROCEDURE — G0463 HOSPITAL OUTPT CLINIC VISIT: HCPCS

## 2019-05-26 VITALS
DIASTOLIC BLOOD PRESSURE: 67 MMHG | HEART RATE: 78 BPM | RESPIRATION RATE: 18 BRPM | SYSTOLIC BLOOD PRESSURE: 124 MMHG | TEMPERATURE: 98.3 F

## 2019-05-26 PROCEDURE — 59025 FETAL NON-STRESS TEST: CPT | Performed by: OBSTETRICS & GYNECOLOGY

## 2019-05-30 ENCOUNTER — PROCEDURE VISIT (OUTPATIENT)
Dept: OBSTETRICS AND GYNECOLOGY | Facility: CLINIC | Age: 29
End: 2019-05-30

## 2019-05-30 ENCOUNTER — ROUTINE PRENATAL (OUTPATIENT)
Dept: OBSTETRICS AND GYNECOLOGY | Facility: CLINIC | Age: 29
End: 2019-05-30

## 2019-05-30 VITALS — SYSTOLIC BLOOD PRESSURE: 102 MMHG | DIASTOLIC BLOOD PRESSURE: 60 MMHG | BODY MASS INDEX: 26.58 KG/M2 | WEIGHT: 163.4 LBS

## 2019-05-30 DIAGNOSIS — R76.8 HCV ANTIBODY POSITIVE: ICD-10-CM

## 2019-05-30 DIAGNOSIS — O09.93 HIGH-RISK PREGNANCY IN THIRD TRIMESTER: Primary | ICD-10-CM

## 2019-05-30 DIAGNOSIS — O26.853 SPOTTING AFFECTING PREGNANCY IN THIRD TRIMESTER: ICD-10-CM

## 2019-05-30 DIAGNOSIS — O24.419 WHITE CLASSIFICATION A2 GESTATIONAL DIABETES MELLITUS: ICD-10-CM

## 2019-05-30 DIAGNOSIS — F19.11 H/O DRUG ABUSE (HCC): ICD-10-CM

## 2019-05-30 DIAGNOSIS — Z87.891 FORMER SMOKER: ICD-10-CM

## 2019-05-30 DIAGNOSIS — F32.A DEPRESSION AFFECTING PREGNANCY: ICD-10-CM

## 2019-05-30 DIAGNOSIS — O24.419 WHITE CLASSIFICATION A2 GESTATIONAL DIABETES MELLITUS: Primary | ICD-10-CM

## 2019-05-30 DIAGNOSIS — O99.340 DEPRESSION AFFECTING PREGNANCY: ICD-10-CM

## 2019-05-30 PROCEDURE — 76818 FETAL BIOPHYS PROFILE W/NST: CPT | Performed by: NURSE PRACTITIONER

## 2019-05-30 PROCEDURE — 99214 OFFICE O/P EST MOD 30 MIN: CPT | Performed by: NURSE PRACTITIONER

## 2019-05-30 PROCEDURE — 76817 TRANSVAGINAL US OBSTETRIC: CPT | Performed by: NURSE PRACTITIONER

## 2019-05-30 NOTE — PROGRESS NOTES
OB follow up > 20 weeks    Chief Complaint   Patient presents with   • Routine Prenatal Visit   • Non-stress Test       Delaney Duron is a 28 y.o.  33w5d being seen today for her obstetrical visit.  Patient reports she has had some spotting and cramping this morning. She did report that she had sexual intercourse last night. . Fetal movement: normal. +PNV.      Review of Systems  No bleeding. No cramping/contractions. No leaking of fluid. Good fetal movement.       /60   Wt 74.1 kg (163 lb 6.4 oz)   LMP 2018 (Approximate)   BMI 26.58 kg/m²     FHT: 140s  BPM   Uterine Size: size equals dates       Assessment    1) pregnancy at 33w5d- US IMP: BPP . JESSICA 13cm. The cervical length measures 4.3cm. No funneling or beaking is noted. Disc importance of a complete BS log. NST/BPP 10/10     2) Former smoker- pt still quit.      3) GMD A2- Incomplete log. Most BS readings are in the expected range, occas has a high reading. Taking Glyburide 2.5 mg BID.       4) S/P tdap.      5)Depression-Taking Zoloft 100 mg QD. Reports the increase in Zoloft from 50mg to 100mg has improved her symptoms.      6)Hep C + - Quant 119,000 and normal liver enzymes on 19    7) Spotting and cramping- Cramping has subsided. Spotting was bright red in color. She did have sex prior to this. Enc pelvic rest.      Plan    Continue prenatal vitamins  Reviewed this stage of pregnancy  Problem list updated   Follow up in 1 week for NST/BPP    Sherly France, RADHA  2019  11:45 AM

## 2019-06-05 ENCOUNTER — PROCEDURE VISIT (OUTPATIENT)
Dept: OBSTETRICS AND GYNECOLOGY | Facility: CLINIC | Age: 29
End: 2019-06-05

## 2019-06-05 ENCOUNTER — ROUTINE PRENATAL (OUTPATIENT)
Dept: OBSTETRICS AND GYNECOLOGY | Facility: CLINIC | Age: 29
End: 2019-06-05

## 2019-06-05 VITALS — WEIGHT: 163 LBS | SYSTOLIC BLOOD PRESSURE: 112 MMHG | DIASTOLIC BLOOD PRESSURE: 64 MMHG | BODY MASS INDEX: 26.51 KG/M2

## 2019-06-05 DIAGNOSIS — O24.419 WHITE CLASSIFICATION A2 GESTATIONAL DIABETES MELLITUS: ICD-10-CM

## 2019-06-05 DIAGNOSIS — O24.419 WHITE CLASSIFICATION A2 GESTATIONAL DIABETES MELLITUS: Primary | ICD-10-CM

## 2019-06-05 DIAGNOSIS — F19.11 H/O DRUG ABUSE (HCC): ICD-10-CM

## 2019-06-05 DIAGNOSIS — R76.8 HCV ANTIBODY POSITIVE: ICD-10-CM

## 2019-06-05 DIAGNOSIS — O09.93 HIGH-RISK PREGNANCY IN THIRD TRIMESTER: Primary | ICD-10-CM

## 2019-06-05 DIAGNOSIS — F32.A DEPRESSION AFFECTING PREGNANCY: ICD-10-CM

## 2019-06-05 DIAGNOSIS — O99.340 DEPRESSION AFFECTING PREGNANCY: ICD-10-CM

## 2019-06-05 DIAGNOSIS — Z87.891 FORMER SMOKER: ICD-10-CM

## 2019-06-05 PROCEDURE — 76818 FETAL BIOPHYS PROFILE W/NST: CPT | Performed by: NURSE PRACTITIONER

## 2019-06-05 PROCEDURE — 99214 OFFICE O/P EST MOD 30 MIN: CPT | Performed by: NURSE PRACTITIONER

## 2019-06-12 ENCOUNTER — PROCEDURE VISIT (OUTPATIENT)
Dept: OBSTETRICS AND GYNECOLOGY | Facility: CLINIC | Age: 29
End: 2019-06-12

## 2019-06-12 ENCOUNTER — ROUTINE PRENATAL (OUTPATIENT)
Dept: OBSTETRICS AND GYNECOLOGY | Facility: CLINIC | Age: 29
End: 2019-06-12

## 2019-06-12 VITALS — WEIGHT: 169 LBS | SYSTOLIC BLOOD PRESSURE: 110 MMHG | DIASTOLIC BLOOD PRESSURE: 62 MMHG | BODY MASS INDEX: 27.49 KG/M2

## 2019-06-12 DIAGNOSIS — O09.93 HIGH-RISK PREGNANCY IN THIRD TRIMESTER: ICD-10-CM

## 2019-06-12 DIAGNOSIS — O24.419 WHITE CLASSIFICATION A2 GESTATIONAL DIABETES MELLITUS: Primary | ICD-10-CM

## 2019-06-12 DIAGNOSIS — R76.8 HCV ANTIBODY POSITIVE: ICD-10-CM

## 2019-06-12 PROCEDURE — 99213 OFFICE O/P EST LOW 20 MIN: CPT | Performed by: OBSTETRICS & GYNECOLOGY

## 2019-06-12 PROCEDURE — 76818 FETAL BIOPHYS PROFILE W/NST: CPT | Performed by: OBSTETRICS & GYNECOLOGY

## 2019-06-12 NOTE — PROGRESS NOTES
OB follow up     Delaney Duron is a 28 y.o.  35w4d being seen today for her obstetrical visit.  Patient reports no bleeding, no contractions and no leaking. Fetal movement: normal.  Prenatal care complicated by gestational diabetes mellitus A2, GERD, hep C carrier, history of drug abuse.    Review of Systems  No bleeding, No cramping/contractions     /62   Wt 76.7 kg (169 lb)   LMP 2018 (Approximate)   BMI 27.49 kg/m²     FHT: present BPM   Uterine Size: 35 cm   BPP is 10 out of 10, JESSICA is 22 cm.  The infant is turned to the vertex position.    Assessment/Plan:    1) 28 y.o.  -pregnancy at 35w4d    2)   Encounter Diagnoses   Name Primary?   • White classification A2 gestational diabetes mellitus Yes   • High-risk pregnancy in third trimester    • HCV antibody positive - RNA +        3) Reviewed this stage of pregnancy  4) Problem list updated     Return in about 7 days (around 2019) for BPP/NST, OB INT, GBS.      Scottie Cooley MD    2019  3:35 PM

## 2019-06-19 ENCOUNTER — HOSPITAL ENCOUNTER (OUTPATIENT)
Facility: HOSPITAL | Age: 29
Discharge: HOME | End: 2019-06-19
Attending: OBSTETRICS & GYNECOLOGY | Admitting: OBSTETRICS & GYNECOLOGY

## 2019-06-19 VITALS
HEART RATE: 91 BPM | RESPIRATION RATE: 18 BRPM | TEMPERATURE: 97.8 F | SYSTOLIC BLOOD PRESSURE: 129 MMHG | BODY MASS INDEX: 27.18 KG/M2 | HEIGHT: 66 IN | WEIGHT: 169.09 LBS | DIASTOLIC BLOOD PRESSURE: 59 MMHG

## 2019-06-19 LAB
A1 MICROGLOB PLACENTAL VAG QL: NEGATIVE
AMPHET+METHAMPHET UR QL: NEGATIVE
AMPHETAMINES UR QL: NEGATIVE
BACTERIA UR QL AUTO: ABNORMAL /HPF
BARBITURATES UR QL SCN: NEGATIVE
BENZODIAZ UR QL SCN: NEGATIVE
BILIRUB UR QL STRIP: NEGATIVE
BUPRENORPHINE SERPL-MCNC: NEGATIVE NG/ML
CANNABINOIDS SERPL QL: NEGATIVE
CLARITY UR: ABNORMAL
COCAINE UR QL: NEGATIVE
COLOR UR: YELLOW
GLUCOSE BLDC GLUCOMTR-MCNC: 98 MG/DL (ref 70–130)
GLUCOSE UR STRIP-MCNC: NEGATIVE MG/DL
HGB UR QL STRIP.AUTO: ABNORMAL
HYALINE CASTS UR QL AUTO: ABNORMAL /LPF
KETONES UR QL STRIP: NEGATIVE
LEUKOCYTE ESTERASE UR QL STRIP.AUTO: ABNORMAL
METHADONE UR QL SCN: NEGATIVE
NITRITE UR QL STRIP: NEGATIVE
OPIATES UR QL: NEGATIVE
OXYCODONE UR QL SCN: NEGATIVE
PCP UR QL SCN: NEGATIVE
PH UR STRIP.AUTO: 6 [PH] (ref 4.5–8)
PROPOXYPH UR QL: NEGATIVE
PROT UR QL STRIP: NEGATIVE
RBC # UR: ABNORMAL /HPF
REF LAB TEST METHOD: ABNORMAL
SP GR UR STRIP: 1.02 (ref 1–1.03)
SQUAMOUS #/AREA URNS HPF: ABNORMAL /HPF
TRICYCLICS UR QL SCN: NEGATIVE
UROBILINOGEN UR QL STRIP: ABNORMAL
WBC UR QL AUTO: ABNORMAL /HPF

## 2019-06-19 PROCEDURE — G0463 HOSPITAL OUTPT CLINIC VISIT: HCPCS

## 2019-06-19 PROCEDURE — 82962 GLUCOSE BLOOD TEST: CPT

## 2019-06-19 PROCEDURE — 80306 DRUG TEST PRSMV INSTRMNT: CPT | Performed by: OBSTETRICS & GYNECOLOGY

## 2019-06-19 PROCEDURE — 81001 URINALYSIS AUTO W/SCOPE: CPT | Performed by: OBSTETRICS & GYNECOLOGY

## 2019-06-19 PROCEDURE — 84112 EVAL AMNIOTIC FLUID PROTEIN: CPT | Performed by: OBSTETRICS & GYNECOLOGY

## 2019-06-20 ENCOUNTER — PROCEDURE VISIT (OUTPATIENT)
Dept: OBSTETRICS AND GYNECOLOGY | Facility: CLINIC | Age: 29
End: 2019-06-20

## 2019-06-20 ENCOUNTER — ROUTINE PRENATAL (OUTPATIENT)
Dept: OBSTETRICS AND GYNECOLOGY | Facility: CLINIC | Age: 29
End: 2019-06-20

## 2019-06-20 VITALS — WEIGHT: 171 LBS | DIASTOLIC BLOOD PRESSURE: 60 MMHG | SYSTOLIC BLOOD PRESSURE: 110 MMHG | BODY MASS INDEX: 27.81 KG/M2

## 2019-06-20 DIAGNOSIS — F19.11 H/O DRUG ABUSE (HCC): ICD-10-CM

## 2019-06-20 DIAGNOSIS — O24.419 WHITE CLASSIFICATION A2 GESTATIONAL DIABETES MELLITUS: Primary | ICD-10-CM

## 2019-06-20 DIAGNOSIS — B19.20 HEPATITIS C VIRUS INFECTION WITHOUT HEPATIC COMA, UNSPECIFIED CHRONICITY: ICD-10-CM

## 2019-06-20 DIAGNOSIS — Z87.891 FORMER SMOKER: ICD-10-CM

## 2019-06-20 DIAGNOSIS — O99.340 DEPRESSION AFFECTING PREGNANCY: ICD-10-CM

## 2019-06-20 DIAGNOSIS — O09.93 HIGH-RISK PREGNANCY IN THIRD TRIMESTER: Primary | ICD-10-CM

## 2019-06-20 DIAGNOSIS — F32.A DEPRESSION AFFECTING PREGNANCY: ICD-10-CM

## 2019-06-20 DIAGNOSIS — Z36.9 ENCOUNTER FOR ANTENATAL SCREENING, UNSPECIFIED: ICD-10-CM

## 2019-06-20 PROCEDURE — 99213 OFFICE O/P EST LOW 20 MIN: CPT | Performed by: NURSE PRACTITIONER

## 2019-06-20 PROCEDURE — 76818 FETAL BIOPHYS PROFILE W/NST: CPT | Performed by: NURSE PRACTITIONER

## 2019-06-20 PROCEDURE — 76816 OB US FOLLOW-UP PER FETUS: CPT | Performed by: NURSE PRACTITIONER

## 2019-06-20 NOTE — NURSING NOTE
rl Fritz  at 36w4d with an JER of 2019, by Ultrasound, was seen at [facility] for a nonstress test.    Chief Complaint   Patient presents with   • Leaking Fluid       Patient Active Problem List   Diagnosis   • Anxiety   • Depression affecting pregnancy- on zoloft 100 mg   • H/O drug abuse- clean x 2 years, UDS neg   • Near syncope   • Former smoker   • High-risk pregnancy in third trimester   • HCV antibody positive - RNA +   • White classification A2 gestational diabetes mellitus   • Anemia       Start Time:   Stop Time:

## 2019-06-20 NOTE — NURSING NOTE
Patient was escorted out by significant other after being provided information about pregnancy in the third trimester and possible rupture of membranes. Patient's questions were answered and reassured to call again if she suspects SROM.

## 2019-06-20 NOTE — PROGRESS NOTES
OB follow up > 20 weeks    Chief Complaint   Patient presents with   • Routine Prenatal Visit   • Non-stress Test       Delaney Duron is a 28 y.o.  36w5d being seen today for her obstetrical visit.  Patient reports no complaints. Fetal movement: normal. +PNV.      Review of Systems  No bleeding. No cramping/contractions. No leaking of fluid. Good fetal movement.       /60   Wt 77.6 kg (171 lb)   LMP 2018 (Approximate)   BMI 27.81 kg/m²     FHT: 140s  BPM   Uterine Size: Growth 47%        Assessment    1) pregnancy at 36w5d: US IMP- EFW 47%. JESSICA 18cm. BPP 8/8.    2) Former smoker- Quit!       3) GMD A2- Taking Glyburide 2.5mg BID. Has some elevated fastings and occas elevated 2hr pp. Enc pt to eat a high protein bedtime snack. NST/BPP 10/10.     4) S/P tdap.      5) Depression-Taking Zoloft 100 mg QD. Reports the increase in Zoloft from 50mg to 100mg has improved her symptoms.      6)Hep C + - Quant 119,000 and normal liver enzymes on 19. Check CMP and quant RNA today.      7) GBS collected today      Plan    Continue prenatal vitamins  Reviewed this stage of pregnancy  Problem list updated   Follow up in 1 weeks for NST/BPP     RADHA Ware  2019  1:45 PM

## 2019-06-23 LAB
ALBUMIN SERPL-MCNC: 3.6 G/DL (ref 3.5–5.2)
ALBUMIN/GLOB SERPL: 1.4 G/DL
ALP SERPL-CCNC: 120 U/L (ref 39–117)
ALT SERPL-CCNC: 11 U/L (ref 1–33)
AST SERPL-CCNC: 19 U/L (ref 1–32)
BASOPHILS # BLD AUTO: 0.03 10*3/MM3 (ref 0–0.2)
BASOPHILS NFR BLD AUTO: 0.4 % (ref 0–1.5)
BILIRUB SERPL-MCNC: 0.2 MG/DL (ref 0.2–1.2)
BUN SERPL-MCNC: 8 MG/DL (ref 6–20)
BUN/CREAT SERPL: 13.6 (ref 7–25)
CALCIUM SERPL-MCNC: 8.6 MG/DL (ref 8.6–10.5)
CHLORIDE SERPL-SCNC: 106 MMOL/L (ref 98–107)
CO2 SERPL-SCNC: 22 MMOL/L (ref 22–29)
CREAT SERPL-MCNC: 0.59 MG/DL (ref 0.57–1)
EOSINOPHIL # BLD AUTO: 0.15 10*3/MM3 (ref 0–0.4)
EOSINOPHIL NFR BLD AUTO: 1.9 % (ref 0.3–6.2)
ERYTHROCYTE [DISTWIDTH] IN BLOOD BY AUTOMATED COUNT: 12.5 % (ref 12.3–15.4)
GLOBULIN SER CALC-MCNC: 2.6 GM/DL
GLUCOSE SERPL-MCNC: 65 MG/DL (ref 65–99)
HCT VFR BLD AUTO: 37.8 % (ref 34–46.6)
HCV RNA SERPL NAA+PROBE-ACNC: NORMAL IU/ML
HCV RNA SERPL NAA+PROBE-LOG IU: 5.1 LOG10 IU/ML
HGB BLD-MCNC: 12.5 G/DL (ref 12–15.9)
IMM GRANULOCYTES # BLD AUTO: 0.03 10*3/MM3 (ref 0–0.05)
IMM GRANULOCYTES NFR BLD AUTO: 0.4 % (ref 0–0.5)
LYMPHOCYTES # BLD AUTO: 1.67 10*3/MM3 (ref 0.7–3.1)
LYMPHOCYTES NFR BLD AUTO: 20.7 % (ref 19.6–45.3)
MCH RBC QN AUTO: 30.5 PG (ref 26.6–33)
MCHC RBC AUTO-ENTMCNC: 33.1 G/DL (ref 31.5–35.7)
MCV RBC AUTO: 92.2 FL (ref 79–97)
MONOCYTES # BLD AUTO: 0.62 10*3/MM3 (ref 0.1–0.9)
MONOCYTES NFR BLD AUTO: 7.7 % (ref 5–12)
NEUTROPHILS # BLD AUTO: 5.58 10*3/MM3 (ref 1.7–7)
NEUTROPHILS NFR BLD AUTO: 68.9 % (ref 42.7–76)
NRBC BLD AUTO-RTO: 0 /100 WBC (ref 0–0.2)
PLATELET # BLD AUTO: 182 10*3/MM3 (ref 140–450)
POTASSIUM SERPL-SCNC: 4.4 MMOL/L (ref 3.5–5.2)
PROT SERPL-MCNC: 6.2 G/DL (ref 6–8.5)
RBC # BLD AUTO: 4.1 10*6/MM3 (ref 3.77–5.28)
SODIUM SERPL-SCNC: 138 MMOL/L (ref 136–145)
TEST INFORMATION: NORMAL
WBC # BLD AUTO: 8.08 10*3/MM3 (ref 3.4–10.8)

## 2019-06-24 LAB — B-HEM STREP SPEC QL CULT: NEGATIVE

## 2019-06-26 ENCOUNTER — ROUTINE PRENATAL (OUTPATIENT)
Dept: OBSTETRICS AND GYNECOLOGY | Facility: CLINIC | Age: 29
End: 2019-06-26

## 2019-06-26 ENCOUNTER — PROCEDURE VISIT (OUTPATIENT)
Dept: OBSTETRICS AND GYNECOLOGY | Facility: CLINIC | Age: 29
End: 2019-06-26

## 2019-06-26 VITALS — DIASTOLIC BLOOD PRESSURE: 68 MMHG | BODY MASS INDEX: 27.89 KG/M2 | SYSTOLIC BLOOD PRESSURE: 96 MMHG | WEIGHT: 171.5 LBS

## 2019-06-26 DIAGNOSIS — O24.419 WHITE CLASSIFICATION A2 GESTATIONAL DIABETES MELLITUS: Primary | ICD-10-CM

## 2019-06-26 DIAGNOSIS — O09.93 HIGH-RISK PREGNANCY IN THIRD TRIMESTER: ICD-10-CM

## 2019-06-26 DIAGNOSIS — F19.11 H/O DRUG ABUSE (HCC): ICD-10-CM

## 2019-06-26 DIAGNOSIS — R76.8 HCV ANTIBODY POSITIVE: ICD-10-CM

## 2019-06-26 DIAGNOSIS — F41.9 ANXIETY: ICD-10-CM

## 2019-06-26 DIAGNOSIS — Z87.891 FORMER SMOKER: ICD-10-CM

## 2019-06-26 PROCEDURE — 99214 OFFICE O/P EST MOD 30 MIN: CPT | Performed by: OBSTETRICS & GYNECOLOGY

## 2019-06-26 PROCEDURE — 76818 FETAL BIOPHYS PROFILE W/NST: CPT | Performed by: OBSTETRICS & GYNECOLOGY

## 2019-06-26 NOTE — PROGRESS NOTES
S:    Pt here for ob office visit & ANT for A2DM & glucometer log review  history:  HPI:Delaney Duron is a 28 y.o.  37w4d being seen today for her obstetrical visit.   Patient reports backache . Fetal movement: normal. .        ROS: Pt denies visual changes, headaches, shortness of breath, chest pain, esophageal reflux, gastric pain, nausea and vomiting, diarrhea, rashes, vaginal bleeding, edema, hip pain, pelvic pressure.     PFSH:   Past Medical History:   Diagnosis Date   • Gestational diabetes 2019   • Hepatitis C    • Substance abuse (CMS/HCC)     clean x3 years        SMOKER? no    ALCOHOL? no  ILLICIT DRUGS? + hx      O:  BP 96/68   Wt 77.8 kg (171 lb 8 oz)   LMP 2018 (Approximate)   BMI 27.89 kg/m² , additional findings in addition to above flow sheet: BPP      A:  MEDICAL DECISION MAKING:   DIAGNOSES:  28 y.o.  37w4d  Patient Active Problem List   Diagnosis   • Anxiety   • Depression affecting pregnancy- on zoloft 100 mg   • H/O drug abuse- clean x 2 years, UDS neg   • Near syncope   • Former smoker   • High-risk pregnancy in third trimester   • HCV antibody positive - RNA +   • White classification A2 gestational diabetes mellitus   • Anemia     NEW PROBLEMS? none    Data Review: UA & ANT  ANT? 10/10, normal u/s: BPP=8/8  JESSICA= 23, INCREASED  Lab Results (last 24 hours)     ** No results found for the last 24 hours. **          There are no diagnoses linked to this encounter.  Pregnancy Assessment : High Risk Pregnancy hep C, A2DM.   Log reviewed: <1/2 values abnormal.  counseld ref: diet.        P:  Tests ordered for this or next visit:  TESTING FOR above  New Meds: non    TIME: More than 50% of time spent in counseling and/or coordination of care.Time spent in counseling 45 min.  Counseling included the following topics diabetes, labor warnings, hep C, review of  testing with prognosis, differential diagnosis, risks, benefits of treatment, instructions,  compliance and/or risk reduction and alternatives.     No Follow-up on file.    Isma Yepez MD

## 2019-07-03 ENCOUNTER — ROUTINE PRENATAL (OUTPATIENT)
Dept: OBSTETRICS AND GYNECOLOGY | Facility: CLINIC | Age: 29
End: 2019-07-03

## 2019-07-03 ENCOUNTER — PROCEDURE VISIT (OUTPATIENT)
Dept: OBSTETRICS AND GYNECOLOGY | Facility: CLINIC | Age: 29
End: 2019-07-03

## 2019-07-03 VITALS — WEIGHT: 174.8 LBS | BODY MASS INDEX: 28.43 KG/M2 | SYSTOLIC BLOOD PRESSURE: 124 MMHG | DIASTOLIC BLOOD PRESSURE: 78 MMHG

## 2019-07-03 DIAGNOSIS — F19.11 H/O DRUG ABUSE (HCC): ICD-10-CM

## 2019-07-03 DIAGNOSIS — Z87.891 FORMER SMOKER: ICD-10-CM

## 2019-07-03 DIAGNOSIS — O09.93 HIGH-RISK PREGNANCY IN THIRD TRIMESTER: ICD-10-CM

## 2019-07-03 DIAGNOSIS — O24.419 WHITE CLASSIFICATION A2 GESTATIONAL DIABETES MELLITUS: ICD-10-CM

## 2019-07-03 DIAGNOSIS — O24.419 WHITE CLASSIFICATION A2 GESTATIONAL DIABETES MELLITUS: Primary | ICD-10-CM

## 2019-07-03 DIAGNOSIS — R76.8 HCV ANTIBODY POSITIVE: Primary | ICD-10-CM

## 2019-07-03 PROCEDURE — 99214 OFFICE O/P EST MOD 30 MIN: CPT | Performed by: OBSTETRICS & GYNECOLOGY

## 2019-07-03 PROCEDURE — 76818 FETAL BIOPHYS PROFILE W/NST: CPT | Performed by: OBSTETRICS & GYNECOLOGY

## 2019-07-03 NOTE — PROGRESS NOTES
S:    Pt here for ob office visit, ANT for A2DM, glucometer log review    history:  HPI:Delaney Duron is a 28 y.o.  38w4d being seen today for her obstetrical visit.   Patient reports backache . Fetal movement: normal. .        ROS: Pt denies visual changes, headaches, shortness of breath, chest pain, esophageal reflux, gastric pain, nausea and vomiting, diarrhea, rashes, vaginal bleeding, edema, hip pain, pelvic pressure.     PFSH:   Past Medical History:   Diagnosis Date   • Gestational diabetes 2019   • Hepatitis C    • Substance abuse (CMS/HCC)     clean x3 years        SMOKER? no    ALCOHOL? no  ILLICIT DRUGS? Not currenetly.       O:  /78   Wt 79.3 kg (174 lb 12.8 oz)   LMP 2018 (Approximate)   BMI 28.43 kg/m² , additional findings in addition to above flow sheet: u/s:  Wnl:  BPP= 10/10.  Us: BPP 8/8.  EJSSICA 23cm.      A:  MEDICAL DECISION MAKING:   DIAGNOSES:  28 y.o.  38w4d  Patient Active Problem List   Diagnosis   • Anxiety   • Depression affecting pregnancy- on zoloft 100 mg   • H/O drug abuse- clean x 2 years, UDS neg   • Near syncope   • Former smoker   • High-risk pregnancy in third trimester   • HCV antibody positive - RNA +   • White classification A2 gestational diabetes mellitus   • Anemia     NEW PROBLEMS? no    Data Review: UA & ANT  ANT? 10/10  Lab Results (last 24 hours)     ** No results found for the last 24 hours. **          Delaney was seen today for routine prenatal visit and non-stress test.    Diagnoses and all orders for this visit:    HCV antibody positive - RNA +    H/O drug abuse- clean x 2 years, UDS neg    Former smoker    High-risk pregnancy in third trimester    White classification A2 gestational diabetes mellitus      Pregnancy Assessment : High Risk Pregnancy A2DM      P:  Tests ordered for this or next visit:  TESTING FOR as above  New Meds: no  IOL 39+ weeks.   No Follow-up on file.      TIME: More than 50% of time spent in  counseling and/or coordination of care.Time spent in counseling 40 min.  Counseling included the following topics induction of labor, postpartum care, reasons to call with prognosis, differential diagnosis, risks, benefits of treatment, instructions, compliance and/or risk reduction and alternatives.     Isma Yepez MD

## 2019-07-08 ENCOUNTER — HOSPITAL ENCOUNTER (OUTPATIENT)
Dept: LABOR AND DELIVERY | Facility: HOSPITAL | Age: 29
Discharge: HOME | End: 2019-07-08

## 2019-07-08 ENCOUNTER — HOSPITAL ENCOUNTER (INPATIENT)
Facility: HOSPITAL | Age: 29
LOS: 1 days | Discharge: HOME | End: 2019-07-08
Attending: OBSTETRICS & GYNECOLOGY | Admitting: OBSTETRICS & GYNECOLOGY

## 2019-07-08 ENCOUNTER — ANESTHESIA (OUTPATIENT)
Dept: OBSTETRICS AND GYNECOLOGY | Facility: HOSPITAL | Age: 29
End: 2019-07-08

## 2019-07-08 ENCOUNTER — ANESTHESIA EVENT (OUTPATIENT)
Dept: OBSTETRICS AND GYNECOLOGY | Facility: HOSPITAL | Age: 29
End: 2019-07-08

## 2019-07-08 VITALS
WEIGHT: 174 LBS | TEMPERATURE: 98.2 F | HEIGHT: 66 IN | OXYGEN SATURATION: 98 % | RESPIRATION RATE: 18 BRPM | HEART RATE: 82 BPM | DIASTOLIC BLOOD PRESSURE: 62 MMHG | SYSTOLIC BLOOD PRESSURE: 112 MMHG | BODY MASS INDEX: 27.97 KG/M2

## 2019-07-08 PROBLEM — Z34.90 TERM PREGNANCY: Status: ACTIVE | Noted: 2019-07-08

## 2019-07-08 LAB
ABO GROUP BLD: NORMAL
AMPHET+METHAMPHET UR QL: NEGATIVE
AMPHETAMINES UR QL: NEGATIVE
BARBITURATES UR QL SCN: NEGATIVE
BENZODIAZ UR QL SCN: NEGATIVE
BLD GP AB SCN SERPL QL: NEGATIVE
BUPRENORPHINE SERPL-MCNC: NEGATIVE NG/ML
CANNABINOIDS SERPL QL: NEGATIVE
COCAINE UR QL: NEGATIVE
DEPRECATED RDW RBC AUTO: 41.8 FL (ref 37–54)
ERYTHROCYTE [DISTWIDTH] IN BLOOD BY AUTOMATED COUNT: 12.8 % (ref 12.3–15.4)
GLUCOSE BLDC GLUCOMTR-MCNC: 143 MG/DL (ref 70–130)
GLUCOSE BLDC GLUCOMTR-MCNC: 83 MG/DL (ref 70–130)
GLUCOSE BLDC GLUCOMTR-MCNC: 88 MG/DL (ref 70–130)
GLUCOSE BLDC GLUCOMTR-MCNC: 96 MG/DL (ref 70–130)
HCT VFR BLD AUTO: 35.8 % (ref 34–46.6)
HGB BLD-MCNC: 11.9 G/DL (ref 12–15.9)
MCH RBC QN AUTO: 30.1 PG (ref 26.6–33)
MCHC RBC AUTO-ENTMCNC: 33.2 G/DL (ref 31.5–35.7)
MCV RBC AUTO: 90.4 FL (ref 79–97)
METHADONE UR QL SCN: NEGATIVE
OPIATES UR QL: NEGATIVE
OXYCODONE UR QL SCN: NEGATIVE
PCP UR QL SCN: NEGATIVE
PLATELET # BLD AUTO: 178 10*3/MM3 (ref 140–450)
PMV BLD AUTO: 11 FL (ref 6–12)
PROPOXYPH UR QL: NEGATIVE
RBC # BLD AUTO: 3.96 10*6/MM3 (ref 3.77–5.28)
RH BLD: POSITIVE
T&S EXPIRATION DATE: NORMAL
TRICYCLICS UR QL SCN: NEGATIVE
WBC NRBC COR # BLD: 9.81 10*3/MM3 (ref 3.4–10.8)

## 2019-07-08 PROCEDURE — 80306 DRUG TEST PRSMV INSTRMNT: CPT | Performed by: OBSTETRICS & GYNECOLOGY

## 2019-07-08 PROCEDURE — 86900 BLOOD TYPING SEROLOGIC ABO: CPT | Performed by: OBSTETRICS & GYNECOLOGY

## 2019-07-08 PROCEDURE — 85027 COMPLETE CBC AUTOMATED: CPT | Performed by: OBSTETRICS & GYNECOLOGY

## 2019-07-08 PROCEDURE — 59410 OBSTETRICAL CARE: CPT | Performed by: OBSTETRICS & GYNECOLOGY

## 2019-07-08 PROCEDURE — 99024 POSTOP FOLLOW-UP VISIT: CPT | Performed by: OBSTETRICS & GYNECOLOGY

## 2019-07-08 PROCEDURE — C1755 CATHETER, INTRASPINAL: HCPCS | Performed by: ANESTHESIOLOGY

## 2019-07-08 PROCEDURE — 82962 GLUCOSE BLOOD TEST: CPT

## 2019-07-08 PROCEDURE — 86850 RBC ANTIBODY SCREEN: CPT | Performed by: OBSTETRICS & GYNECOLOGY

## 2019-07-08 PROCEDURE — S0260 H&P FOR SURGERY: HCPCS | Performed by: OBSTETRICS & GYNECOLOGY

## 2019-07-08 PROCEDURE — 86901 BLOOD TYPING SEROLOGIC RH(D): CPT | Performed by: OBSTETRICS & GYNECOLOGY

## 2019-07-08 RX ORDER — DOCUSATE SODIUM 100 MG/1
100 CAPSULE, LIQUID FILLED ORAL 2 TIMES DAILY
Status: DISCONTINUED | OUTPATIENT
Start: 2019-07-08 | End: 2019-07-08 | Stop reason: HOSPADM

## 2019-07-08 RX ORDER — MINERAL OIL
OIL (ML) MISCELLANEOUS ONCE
Status: DISCONTINUED | OUTPATIENT
Start: 2019-07-08 | End: 2019-07-08 | Stop reason: HOSPADM

## 2019-07-08 RX ORDER — OXYTOCIN/0.9 % SODIUM CHLORIDE 30/500 ML
85 PLASTIC BAG, INJECTION (ML) INTRAVENOUS ONCE
Status: COMPLETED | OUTPATIENT
Start: 2019-07-08 | End: 2019-07-08

## 2019-07-08 RX ORDER — OXYTOCIN/0.9 % SODIUM CHLORIDE 30/500 ML
650 PLASTIC BAG, INJECTION (ML) INTRAVENOUS ONCE
Status: COMPLETED | OUTPATIENT
Start: 2019-07-08 | End: 2019-07-08

## 2019-07-08 RX ORDER — ONDANSETRON 4 MG/1
4 TABLET, FILM COATED ORAL EVERY 6 HOURS PRN
Status: DISCONTINUED | OUTPATIENT
Start: 2019-07-08 | End: 2019-07-08 | Stop reason: HOSPADM

## 2019-07-08 RX ORDER — MISOPROSTOL 200 UG/1
800 TABLET ORAL AS NEEDED
Status: DISCONTINUED | OUTPATIENT
Start: 2019-07-08 | End: 2019-07-08 | Stop reason: HOSPADM

## 2019-07-08 RX ORDER — IBUPROFEN 800 MG/1
TABLET ORAL
Status: COMPLETED
Start: 2019-07-08 | End: 2019-07-08

## 2019-07-08 RX ORDER — OXYTOCIN/0.9 % SODIUM CHLORIDE 30/500 ML
2-20 PLASTIC BAG, INJECTION (ML) INTRAVENOUS
Status: DISCONTINUED | OUTPATIENT
Start: 2019-07-08 | End: 2019-07-08

## 2019-07-08 RX ORDER — MAGNESIUM CARB/ALUMINUM HYDROX 105-160MG
TABLET,CHEWABLE ORAL
Status: COMPLETED
Start: 2019-07-08 | End: 2019-07-08

## 2019-07-08 RX ORDER — ONDANSETRON 2 MG/ML
4 INJECTION INTRAMUSCULAR; INTRAVENOUS EVERY 6 HOURS PRN
Status: DISCONTINUED | OUTPATIENT
Start: 2019-07-08 | End: 2019-07-08 | Stop reason: HOSPADM

## 2019-07-08 RX ORDER — PRENATAL VIT/IRON FUM/FOLIC AC 27MG-0.8MG
TABLET ORAL
Status: COMPLETED
Start: 2019-07-08 | End: 2019-07-08

## 2019-07-08 RX ORDER — LANOLIN 100 %
OINTMENT (GRAM) TOPICAL
Status: DISCONTINUED | OUTPATIENT
Start: 2019-07-08 | End: 2019-07-08 | Stop reason: HOSPADM

## 2019-07-08 RX ORDER — PROMETHAZINE HYDROCHLORIDE 25 MG/ML
12.5 INJECTION, SOLUTION INTRAMUSCULAR; INTRAVENOUS EVERY 6 HOURS PRN
Status: DISCONTINUED | OUTPATIENT
Start: 2019-07-08 | End: 2019-07-08 | Stop reason: HOSPADM

## 2019-07-08 RX ORDER — PSEUDOEPHEDRINE HCL 30 MG
100 TABLET ORAL 2 TIMES DAILY
Qty: 30 EACH | Refills: 0 | Status: SHIPPED | OUTPATIENT
Start: 2019-07-08 | End: 2019-07-21

## 2019-07-08 RX ORDER — LIDOCAINE HYDROCHLORIDE AND EPINEPHRINE 15; 5 MG/ML; UG/ML
INJECTION, SOLUTION EPIDURAL AS NEEDED
Status: DISCONTINUED | OUTPATIENT
Start: 2019-07-08 | End: 2019-07-08 | Stop reason: SURG

## 2019-07-08 RX ORDER — SODIUM CHLORIDE 0.9 % (FLUSH) 0.9 %
1-10 SYRINGE (ML) INJECTION AS NEEDED
Status: DISCONTINUED | OUTPATIENT
Start: 2019-07-08 | End: 2019-07-08 | Stop reason: HOSPADM

## 2019-07-08 RX ORDER — SODIUM CHLORIDE, SODIUM LACTATE, POTASSIUM CHLORIDE, CALCIUM CHLORIDE 600; 310; 30; 20 MG/100ML; MG/100ML; MG/100ML; MG/100ML
125 INJECTION, SOLUTION INTRAVENOUS CONTINUOUS
Status: DISCONTINUED | OUTPATIENT
Start: 2019-07-08 | End: 2019-07-08 | Stop reason: HOSPADM

## 2019-07-08 RX ORDER — SUFENTANIL CITRATE 50 UG/ML
INJECTION EPIDURAL; INTRAVENOUS
Status: DISPENSED
Start: 2019-07-08 | End: 2019-07-08

## 2019-07-08 RX ORDER — FAMOTIDINE 20 MG/1
20 TABLET, FILM COATED ORAL
Status: DISCONTINUED | OUTPATIENT
Start: 2019-07-08 | End: 2019-07-08 | Stop reason: HOSPADM

## 2019-07-08 RX ORDER — IBUPROFEN 800 MG/1
800 TABLET ORAL 3 TIMES DAILY
Qty: 30 TABLET | Refills: 0 | Status: SHIPPED | OUTPATIENT
Start: 2019-07-08 | End: 2019-09-10

## 2019-07-08 RX ORDER — PRENATAL VIT/IRON FUM/FOLIC AC 27MG-0.8MG
1 TABLET ORAL DAILY
Status: DISCONTINUED | OUTPATIENT
Start: 2019-07-08 | End: 2019-07-08 | Stop reason: HOSPADM

## 2019-07-08 RX ORDER — DOCUSATE SODIUM 100 MG/1
CAPSULE, LIQUID FILLED ORAL
Status: COMPLETED
Start: 2019-07-08 | End: 2019-07-08

## 2019-07-08 RX ORDER — CARBOPROST TROMETHAMINE 250 UG/ML
250 INJECTION, SOLUTION INTRAMUSCULAR AS NEEDED
Status: DISCONTINUED | OUTPATIENT
Start: 2019-07-08 | End: 2019-07-08 | Stop reason: HOSPADM

## 2019-07-08 RX ORDER — FERROUS SULFATE TAB EC 324 MG (65 MG FE EQUIVALENT) 324 (65 FE) MG
TABLET DELAYED RESPONSE ORAL
Status: COMPLETED
Start: 2019-07-08 | End: 2019-07-08

## 2019-07-08 RX ORDER — FERROUS SULFATE TAB EC 324 MG (65 MG FE EQUIVALENT) 324 (65 FE) MG
324 TABLET DELAYED RESPONSE ORAL
Status: DISCONTINUED | OUTPATIENT
Start: 2019-07-08 | End: 2019-07-08 | Stop reason: HOSPADM

## 2019-07-08 RX ORDER — METHYLERGONOVINE MALEATE 0.2 MG/ML
200 INJECTION INTRAVENOUS ONCE AS NEEDED
Status: DISCONTINUED | OUTPATIENT
Start: 2019-07-08 | End: 2019-07-08 | Stop reason: HOSPADM

## 2019-07-08 RX ORDER — IBUPROFEN 800 MG/1
800 TABLET ORAL 3 TIMES DAILY
Status: DISCONTINUED | OUTPATIENT
Start: 2019-07-08 | End: 2019-07-08 | Stop reason: HOSPADM

## 2019-07-08 RX ORDER — BISACODYL 10 MG
10 SUPPOSITORY, RECTAL RECTAL DAILY PRN
Status: DISCONTINUED | OUTPATIENT
Start: 2019-07-09 | End: 2019-07-08 | Stop reason: HOSPADM

## 2019-07-08 RX ORDER — FAMOTIDINE 20 MG/1
20 TABLET, FILM COATED ORAL EVERY 12 HOURS PRN
Status: DISCONTINUED | OUTPATIENT
Start: 2019-07-08 | End: 2019-07-08 | Stop reason: HOSPADM

## 2019-07-08 RX ORDER — OXYTOCIN/0.9 % SODIUM CHLORIDE 30/500 ML
85 PLASTIC BAG, INJECTION (ML) INTRAVENOUS ONCE
Status: DISCONTINUED | OUTPATIENT
Start: 2019-07-08 | End: 2019-07-08 | Stop reason: HOSPADM

## 2019-07-08 RX ORDER — TERBUTALINE SULFATE 1 MG/ML
0.25 INJECTION, SOLUTION SUBCUTANEOUS AS NEEDED
Status: DISCONTINUED | OUTPATIENT
Start: 2019-07-08 | End: 2019-07-08 | Stop reason: HOSPADM

## 2019-07-08 RX ORDER — OXYTOCIN/0.9 % SODIUM CHLORIDE 30/500 ML
650 PLASTIC BAG, INJECTION (ML) INTRAVENOUS ONCE
Status: DISCONTINUED | OUTPATIENT
Start: 2019-07-08 | End: 2019-07-08 | Stop reason: HOSPADM

## 2019-07-08 RX ORDER — PROMETHAZINE HYDROCHLORIDE 25 MG/1
25 TABLET ORAL EVERY 6 HOURS PRN
Status: DISCONTINUED | OUTPATIENT
Start: 2019-07-08 | End: 2019-07-08 | Stop reason: HOSPADM

## 2019-07-08 RX ORDER — PROMETHAZINE HYDROCHLORIDE 25 MG/1
12.5 SUPPOSITORY RECTAL EVERY 6 HOURS PRN
Status: DISCONTINUED | OUTPATIENT
Start: 2019-07-08 | End: 2019-07-08 | Stop reason: HOSPADM

## 2019-07-08 RX ADMIN — SODIUM CHLORIDE, POTASSIUM CHLORIDE, SODIUM LACTATE AND CALCIUM CHLORIDE 125 ML/HR: 600; 310; 30; 20 INJECTION, SOLUTION INTRAVENOUS at 02:48

## 2019-07-08 RX ADMIN — DOCUSATE SODIUM 100 MG: 100 CAPSULE, LIQUID FILLED ORAL at 12:02

## 2019-07-08 RX ADMIN — LIDOCAINE HYDROCHLORIDE,EPINEPHRINE BITARTRATE 3 ML: 15; .005 INJECTION, SOLUTION EPIDURAL; INFILTRATION; INTRACAUDAL; PERINEURAL at 04:03

## 2019-07-08 RX ADMIN — FERROUS SULFATE TAB EC 324 MG (65 MG FE EQUIVALENT) 324 MG: 324 (65 FE) TABLET DELAYED RESPONSE at 11:55

## 2019-07-08 RX ADMIN — PRENATAL VIT W/ FE FUMARATE-FA TAB 27-0.8 MG 1 TABLET: 27-0.8 TAB at 11:56

## 2019-07-08 RX ADMIN — WITCH HAZEL 1 PAD: 500 SOLUTION RECTAL; TOPICAL at 12:03

## 2019-07-08 RX ADMIN — OXYTOCIN 85 ML/HR: 10 INJECTION INTRAVENOUS at 11:30

## 2019-07-08 RX ADMIN — IBUPROFEN 800 MG: 800 TABLET ORAL at 12:02

## 2019-07-08 RX ADMIN — SERTRALINE HYDROCHLORIDE 100 MG: 50 TABLET ORAL at 13:12

## 2019-07-08 RX ADMIN — SODIUM CHLORIDE, POTASSIUM CHLORIDE, SODIUM LACTATE AND CALCIUM CHLORIDE 125 ML/HR: 600; 310; 30; 20 INJECTION, SOLUTION INTRAVENOUS at 12:04

## 2019-07-08 RX ADMIN — MINERAL OIL: 471.95 OIL ORAL at 10:27

## 2019-07-08 RX ADMIN — SUFENTANIL CITRATE 10 ML/HR: 50 INJECTION EPIDURAL; INTRAVENOUS at 04:15

## 2019-07-08 RX ADMIN — Medication 1 TABLET: at 11:56

## 2019-07-08 RX ADMIN — SODIUM CHLORIDE, POTASSIUM CHLORIDE, SODIUM LACTATE AND CALCIUM CHLORIDE 1000 ML: 600; 310; 30; 20 INJECTION, SOLUTION INTRAVENOUS at 03:13

## 2019-07-08 RX ADMIN — OXYTOCIN 650 ML/HR: 10 INJECTION INTRAVENOUS at 10:40

## 2019-07-08 NOTE — PAYOR COMM NOTE
"[facility]   &  [facility]  [address]    [address] [place] [address]    [place] [address]    Tra Cohenalicetianna  Utilization Review/Room Reservations  Phone: [phone], [phone] or [phone]  Fax: [phone]  Email: [email or web address]  Please call, fax back, or email with authorization or any questions! Thanks!      MATERNITY AUTH REQUEST  REF# PER [id number]  ID#[id number]          This fax contains any of the following:  Face Sheet, H&P, progress notes, consults, orders, meds, lab results, labor record, vitals, delivery worksheet, op note, d/c summary.        Oliverio, Joshua YI (28 y.o. Female)     Date of Birth Social Security Number Address Home Phone MRN    1990  [address] [facility] [id number] [phone] [id number]    Advent Marital Status          None Single       Admission Date Admission Type Admitting Provider Attending Provider Department, Room/Bed    7/8/19 Elective Marleni, Isma Gray MD  [facility] OB GYN, 1122/1    Discharge Date Discharge Disposition Discharge Destination        7/8/2019 Home or Self Care              Attending Provider:  (none)   Allergies:  No Known Allergies    Isolation:  None   Infection:  None   Code Status:  CPR    Ht:  167.6 cm (66\")   Wt:  78.9 kg (174 lb)    Admission Cmt:  None   Principal Problem:  None                Active Insurance as of 7/8/2019     Primary Coverage     Payor Plan Insurance Group Employer/Plan Group    [employer] MEDICAID [employer] [id number]     Payor Plan Address Payor Plan Phone Number Payor Plan Fax Number Effective Dates    PO  [phone]  12/5/2018 - None Entered    [facility] [id number]       Subscriber Name Subscriber Birth Date Member ID       CONOR KAISERROSALBA YI 1990 [id number]                 Emergency Contacts      (Rel.) Home Phone Work Phone Mobile Phone    silvestre vega " "(Mother) -- -- [phone]               History & Physical      Scottie Cooley MD at 2019  9:10 AM        28-year-old  5 para 2-0-2-2 presents with spontaneous rupture of membranes just after midnight last night.  She reports clear fluid.  Active fetal movement.  She was admitted and received an epidural.  She is currently 6 to 7 cm.  She is GBS negative and her blood type is a positive.  Prenatal care is affected by depression for which she takes Zoloft.  She has a history of drug abuse and has been clean for the last 2 years.  Unfortunately she contracted hepatitis C during this time.  She is also A2 diabetic.  Her  testing has been reassuring.  Currently she is comfortable with her epidural.  She is afebrile her vital signs are normal.  The infant has a baseline in the 140s and reactive with good long-term variability.  I recently saw her one variable deceleration.  I anticipate she is rapidly progressing.  Anticipate spontaneous vaginal delivery.    Scottie Cooley MD  2019  9:18 AM      Electronically signed by Scottie Cooley MD at 2019  9:19 AM       Hospital Medications (all)       Dose Frequency Start End    bisacodyl (DULCOLAX) suppository 10 mg 10 mg Daily PRN 2019     Sig - Route: Insert 1 suppository into the rectum Daily As Needed for Constipation. - Rectal    bupivacaine (MARCAINE) epidural  - ADS Override Pull   2019    Notes to Pharmacy: Created by cabinet override    bupivacaine (PF) (MARCAINE) 0.5 % 0.15 %, SUFentanil (SUFENTA) 1 mcg/mL in sodium chloride 0.9 % 100 mL epidural 12 mL/hr Continuous 2019    Sig - Route: 12 mL/hr by Epidural route Continuous. - Epidural    Linked Group 1:  \"Followed by\" Linked Group Details        bupivacaine 0.15% + sufentanil 1 mcg/mL bolus from bag 7 mL 7 mL Once 2019    Sig - Route: 7 mL by Epidural route 1 (One) Time. - Epidural    Linked Group 1:  \"Followed " "by\" Linked Group Details        carboprost (HEMABATE) injection 250 mcg 250 mcg As Needed 7/8/2019     Sig - Route: Inject 1 mL into the appropriate muscle as directed by prescriber As Needed (hemorrhage). - Intramuscular    Cosign for Ordering: Accepted by Isma Yepez MD on 7/8/2019  2:41 PM    docusate sodium (COLACE) capsule 100 mg 100 mg 2 Times Daily 7/8/2019     Sig - Route: Take 1 capsule by mouth 2 (Two) Times a Day. - Oral    famotidine (PEPCID) injection 20 mg 20 mg Every 12 Hours PRN 7/8/2019     Sig - Route: Infuse 2 mL into a venous catheter Every 12 (Twelve) Hours As Needed (HEARTBURN). - Intravenous    Cosign for Ordering: Accepted by Isma Yepez MD on 7/8/2019  9:17 AM    Linked Group 2:  \"Or\" Linked Group Details        famotidine (PEPCID) tablet 20 mg 20 mg Every 12 Hours PRN 7/8/2019     Sig - Route: Take 1 tablet by mouth Every 12 (Twelve) Hours As Needed for Heartburn. - Oral    Cosign for Ordering: Accepted by Isma Yepez MD on 7/8/2019  9:17 AM    Linked Group 2:  \"Or\" Linked Group Details        famotidine (PEPCID) tablet 20 mg 20 mg 2 Times Daily Before Meals 7/8/2019     Sig - Route: Take 1 tablet by mouth 2 (Two) Times a Day Before Meals. - Oral    ferrous sulfate EC tablet 324 mg 324 mg Daily With Breakfast 7/8/2019     Sig - Route: Take 1 tablet by mouth Daily With Breakfast. - Oral    ibuprofen (ADVIL,MOTRIN) tablet 800 mg 800 mg 3 Times Daily 7/8/2019     Sig - Route: Take 1 tablet by mouth 3 (Three) Times a Day. - Oral    lactated ringers bolus 1,000 mL 1,000 mL Once As Needed 7/8/2019 7/8/2019    Sig - Route: Infuse 1,000 mL into a venous catheter 1 (One) Time As Needed (EPIDDURAL). - Intravenous    Cosign for Ordering: Accepted by Isma Yepez MD on 7/8/2019  9:17 AM    lactated ringers infusion 125 mL/hr Continuous 7/8/2019     Sig - Route: Infuse 125 mL/hr into a venous catheter Continuous. - Intravenous    Cosign for " "Ordering: Accepted by Isma Yepez MD on 7/8/2019  9:17 AM    lactated ringers infusion 125 mL/hr Continuous 7/8/2019     Sig - Route: Infuse 125 mL/hr into a venous catheter Continuous. - Intravenous    lanolin ointment  Every 1 Hour PRN 7/8/2019     Sig - Route: Apply  topically to the appropriate area as directed Every 1 (One) Hour As Needed for Dry Skin (nipple pain). - Topical    magnesium hydroxide (MILK OF MAGNESIA) 400 MG/5ML suspension 10 mL 10 mL Daily PRN 7/8/2019     Sig - Route: Take 10 mL by mouth Daily As Needed for Constipation. - Oral    methylergonovine (METHERGINE) injection 200 mcg 200 mcg Once As Needed 7/8/2019     Sig - Route: Inject 1 mL into the appropriate muscle as directed by prescriber 1 (One) Time As Needed (hemorrhage). - Intramuscular    Cosign for Ordering: Accepted by Isma Yepez MD on 7/8/2019  2:41 PM    mineral oil liquid  - ADS Override Pull   7/8/2019 7/8/2019    Notes to Pharmacy: Created by cabinet override    mineral oil  Once 7/8/2019     Sig - Route: Apply  topically to the appropriate area as directed 1 (One) Time. - Topical    Cosign for Ordering: Accepted by Isma Yepez MD on 7/8/2019  9:17 AM    misoprostol (CYTOTEC) tablet 800 mcg 800 mcg As Needed 7/8/2019     Sig - Route: Insert 4 tablets into the rectum As Needed (Postpartum Hemorrhage). - Rectal    Cosign for Ordering: Accepted by Isma Yepez MD on 7/8/2019  2:41 PM    ondansetron (ZOFRAN) injection 4 mg 4 mg Every 6 Hours PRN 7/8/2019     Sig - Route: Infuse 2 mL into a venous catheter Every 6 (Six) Hours As Needed for Nausea or Vomiting. - Intravenous    Cosign for Ordering: Accepted by Isma Yepez MD on 7/8/2019  9:17 AM    Linked Group 3:  \"Or\" Linked Group Details        ondansetron (ZOFRAN) injection 4 mg 4 mg Every 6 Hours PRN 7/8/2019     Sig - Route: Infuse 2 mL into a venous catheter Every 6 (Six) Hours As Needed for Nausea or " "Vomiting. - Intravenous    Linked Group 4:  \"Or\" Linked Group Details        ondansetron (ZOFRAN) tablet 4 mg 4 mg Every 6 Hours PRN 7/8/2019     Sig - Route: Take 1 tablet by mouth Every 6 (Six) Hours As Needed for Nausea or Vomiting. - Oral    Cosign for Ordering: Accepted by Isma Yepez MD on 7/8/2019  9:17 AM    Linked Group 3:  \"Or\" Linked Group Details        ondansetron (ZOFRAN) tablet 4 mg 4 mg Every 6 Hours PRN 7/8/2019     Sig - Route: Take 1 tablet by mouth Every 6 (Six) Hours As Needed for Nausea or Vomiting. - Oral    Linked Group 4:  \"Or\" Linked Group Details        oxytocin (PITOCIN) 30 units in 0.9% sodium chloride 500 mL (premix) 650 mL/hr Once 7/8/2019     Sig - Route: Infuse 39 Units/hr into a venous catheter 1 (One) Time. - Intravenous    Cosign for Ordering: Accepted by Isma Yepez MD on 7/8/2019  2:41 PM    Linked Group 5:  \"Followed by\" Linked Group Details        oxytocin (PITOCIN) 30 units in 0.9% sodium chloride 500 mL (premix) 85 mL/hr Once 7/8/2019 7/8/2019    Sig - Route: Infuse 5.1 Units/hr into a venous catheter 1 (One) Time. - Intravenous    Cosign for Ordering: Accepted by Isma Yepez MD on 7/8/2019  2:41 PM    Linked Group 5:  \"Followed by\" Linked Group Details        oxytocin (PITOCIN) 30 units in 0.9% sodium chloride 500 mL (premix) 650 mL/hr Once 7/8/2019 7/8/2019    Sig - Route: Infuse 39 Units/hr into a venous catheter 1 (One) Time. - Intravenous    Linked Group 6:  \"Followed by\" Linked Group Details        oxytocin (PITOCIN) 30 units in 0.9% sodium chloride 500 mL (premix) 85 mL/hr Once 7/8/2019     Sig - Route: Infuse 5.1 Units/hr into a venous catheter 1 (One) Time. - Intravenous    Linked Group 6:  \"Followed by\" Linked Group Details        prenatal vitamin 27-0.8 tablet 1 tablet 1 tablet Daily 7/8/2019     Sig - Route: Take 1 tablet by mouth Daily. - Oral    promethazine (PHENERGAN) injection 12.5 mg 12.5 mg Every 6 Hours PRN " "7/8/2019     Sig - Route: Inject 0.5 mL into the appropriate muscle as directed by prescriber Every 6 (Six) Hours As Needed for Nausea or Vomiting. - Intramuscular    Linked Group 7:  \"Or\" Linked Group Details        promethazine (PHENERGAN) suppository 12.5 mg 12.5 mg Every 6 Hours PRN 7/8/2019     Sig - Route: Insert 0.5 suppositories into the rectum Every 6 (Six) Hours As Needed for Nausea or Vomiting. - Rectal    Linked Group 7:  \"Or\" Linked Group Details        promethazine (PHENERGAN) tablet 25 mg 25 mg Every 6 Hours PRN 7/8/2019     Sig - Route: Take 1 tablet by mouth Every 6 (Six) Hours As Needed for Nausea or Vomiting. - Oral    Linked Group 7:  \"Or\" Linked Group Details        sertraline (ZOLOFT) tablet 100 mg 100 mg Daily 7/8/2019     Sig - Route: Take 2 tablets by mouth Daily. - Oral    sodium chloride 0.9 % flush 1-10 mL 1-10 mL As Needed 7/8/2019     Sig - Route: Infuse 1-10 mL into a venous catheter As Needed for Line Care. - Intravenous    SUFentanil (SUFENTA) 100 MCG/2ML injection  - ADS Override Pull   7/8/2019 7/8/2019    Notes to Pharmacy: Created by cabinet override    terbutaline (BRETHINE) injection 0.25 mg 0.25 mg As Needed 7/8/2019     Sig - Route: Inject 0.25 mL under the skin into the appropriate area as directed As Needed (fetal hyperstimulation/distress). - Subcutaneous    Cosign for Ordering: Accepted by Isma Yepez MD on 7/8/2019  9:17 AM    witch hazel-glycerin (TUCKS) pad 1 pad 1 each As Needed 7/8/2019     Sig - Route: Apply 1 pad topically to the appropriate area as directed As Needed for Irritation or Hemorrhoids. - Topical    oxytocin (PITOCIN) 30 units in 0.9% sodium chloride 500 mL (premix) (Discontinued) 2-20 lucia-units/min Titrated 7/8/2019 7/8/2019    Sig - Route: Infuse 0.002-0.02 Units/min into a venous catheter Dose Adjusted By Provider As Needed. - Intravenous    Cosign for Ordering: Accepted by Isma Yepez MD on 7/8/2019  9:17 AM    "         Lab Results (last 24 hours)     Procedure Component Value Units Date/Time    POC Glucose Once [[id number]]  (Normal) Collected:  07/08/19 0936    Specimen:  Blood Updated:  07/08/19 0944     Glucose 88 mg/dL     POC Glucose Once [[id number]]  (Normal) Collected:  07/08/19 0737    Specimen:  Blood Updated:  07/08/19 0751     Glucose 83 mg/dL     POC Glucose Once [[id number]]  (Normal) Collected:  07/08/19 0541    Specimen:  Blood Updated:  07/08/19 0547     Glucose 96 mg/dL     Urine Drug Screen - Urine, Clean Catch [[id number]]  (Normal) Collected:  07/08/19 0237    Specimen:  Urine, Clean Catch Updated:  07/08/19 0326     THC, Screen, Urine Negative     Phencyclidine (PCP), Urine Negative     Cocaine Screen, Urine Negative     Methamphetamine, Ur Negative     Opiate Screen Negative     Amphetamine Screen, Urine Negative     Benzodiazepine Screen, Urine Negative     Tricyclic Antidepressants Screen Negative     Methadone Screen, Urine Negative     Barbiturates Screen, Urine Negative     Oxycodone Screen, Urine Negative     Propoxyphene Screen Negative     Buprenorphine, Screen, Urine Negative    Narrative:       Urine drug screen results are to be used for medical purposes only.  They are not to be used for legal purposes such as employment testing.  Negative results do not necessarily mean the complete absence of a subtance, but rather that the result is less than the cutoff for that substance.  Positive results are unconfirmed and considered Preliminary Positive.  [facility] does not automatically confirm Postitive Unconfirmed results.  The physician may request (order) an Unconfirmed Positive result to be sent out for confirmation.      Negative Thresholds for Drugs Screened:    THC screen, urine                          50 ng/ml  Phenycyclidine (PCP), urine                25 ng/ml  Cocaine screen, urine                     150 ng/ml  Methamphetamine, urine                    500 ng/ml  Opiate  screen, urine                      100 ng/ml  Amphetamine screen, urine                 500 ng/ml  Benzodiazepine screen, urine              150 ng/ml  Tricyclic Antidepressants screen, urine   300 ng/ml  Methadone screen, urine                   200 ng/ml  Barbiturates screen, urine                200 ng/ml  Oxycodone screen, urine                   100 ng/ml  Propoxyphene screen, urine                300 ng/ml  Buprenorphine screen, urine                10 ng/ml    CBC (No Diff) [[id number]]  (Abnormal) Collected:  07/08/19 0237    Specimen:  Blood Updated:  07/08/19 0316     WBC 9.81 10*3/mm3      RBC 3.96 10*6/mm3      Hemoglobin 11.9 g/dL      Hematocrit 35.8 %      MCV 90.4 fL      MCH 30.1 pg      MCHC 33.2 g/dL      RDW 12.8 %      RDW-SD 41.8 fl      MPV 11.0 fL      Platelets 178 10*3/mm3     POC Glucose Once [[id number]]  (Abnormal) Collected:  07/08/19 0302    Specimen:  Blood Updated:  07/08/19 0308     Glucose 143 mg/dL            Operative/Procedure Notes (last 24 hours) (Notes from 7/7/2019  7:39 PM through 7/8/2019  7:39 PM)      Scottie Cooley MD at 7/8/2019 11:05 AM           Allie Jovel  Vaginal Delivery Note    Delivery     Delivery: Vaginal, Spontaneous     YOB: 2017    Time of Birth:  Gestational Age 10:36 AM   39w2d     Anesthesia: Epidural     Delivering clinician: Scottie Cooley    Forceps?   No   Vacuum? No    Shoulder dystocia present: No        Delivery narrative: 28-year-old multiparous patient scheduled for induction of labor due to gestational diabetes mellitus A2 had spontaneous rupture of membranes at night before scheduled induction.  She presented received an epidural and progressed over normal labor curve to complete complete and +3 station.  The infant's head was delivered in the OP position.  Infant was bulb suctioned on the perineum.  Shoulders and body were easily gently delivered.  Cord was clamped and cut and infant was placed on maternal  abdomen.  The infant had a strong cry good tone facial grimace to bulb suction.  Cord blood was obtained.  The placenta was delivered intact with three-vessel cord.  There are no lacerations.  Sponge and lap counts are correct the patient was stable at the end of the procedure.    Infant    Findings: female  infant     Infant observations: Weight: No birth weight on file.   Length:    in  Observations/Comments:         Apgars: 8   @ 1 minute /    9   @ 5 minutes   Infant Name: Adithya     Placenta, Cord, and Fluid    Placenta delivered  Spontaneous  at         Cord:    present.   Nuchal Cord?  no   Cord blood obtained: Yes    Cord gases obtained:  No    Cord gas results: Venous:  No results found for: PHCVEN    Arterial:  No results found for: PHCART     Repair    Episiotomy: Not recorded    Lacerations: No   Estimated Blood Loss:             Complications  none    Disposition  Mother to Mother Baby/Postpartum  in stable condition currently.  Baby to NBN  in stable condition currently.      Scottie Cooley MD  07/08/19  11:05 AM        Electronically signed by Scottie Cooley MD at 7/8/2019 11:08 AM             Maternal Vitals (last day) before discharge     Date/Time   Temp   Pulse   Resp   BP   SpO2   Weight    07/08/19 1554   98.2 (36.8)   82   18   112/62   98   --    07/08/19 1400   98.1 (36.7)   79   18   130/71   --   --    07/08/19 1230   --   87   --   116/59   --   --    07/08/19 1229   --   86   --   114/55   --   --    07/08/19 1215   --   74   --   111/55   --   --    07/08/19 1145   --   82   --   109/60   --   --    07/08/19 1130   --   93   --   116/63   --   --    07/08/19 1115   --   84   --   116/58   --   --    07/08/19 1100   --   99   17   137/62   --   --    07/08/19 1045   98.1 (36.7)   105   17   131/69   --   --    07/08/19 1000   --   93   --   128/74   --   --    07/08/19 0930   98.2 (36.8)   --   18   --   --   --    07/08/19 0900   --   110   --   102/56   --   --     07/08/19 0846   --   91   --   113/66   --   --    07/08/19 0830   --   88   --   129/66   --   --    07/08/19 0815   --   94   --   128/56   --   --    07/08/19 0800   --   102   17   105/57   --   --    07/08/19 0738   --   91   --   113/57   --   --    07/08/19 0730   98.1 (36.7)   92   17   122/69   --   --    07/08/19 0708   --   82   --   118/63   --   --    07/08/19 0654   --   93   --   124/74   --   --    07/08/19 0638   --   110   --   125/79   --   --    07/08/19 0631   97.5 (36.4)   --   --   --   --   --    07/08/19 0623   --   89   --   130/73   --   --    07/08/19 0608   --   99   --   127/64   --   --    07/08/19 0552   --   89   --   123/72   --   --    07/08/19 0540   --   86   18   117/67   --   --    07/08/19 0538   --   86   --   123/72   --   --    07/08/19 0523   --   103   18   130/75   --   --    07/08/19 0509   --   100   --   132/92   --   --    07/08/19 0454   --   96   --   126/81   --   --    07/08/19 0436   --   100   18   133/78   --   --    07/08/19 0434   --   105   --   130/71   --   --    07/08/19 0433   --   100   --   124/73   --   --    07/08/19 0430   --   105   --   124/77   --   --    07/08/19 0428   --   98   --   117/76   --   --    07/08/19 0426   --   109   --   137/84   --   --    07/08/19 0425   --   101   --   122/76   --   --    07/08/19 0422   --   99   --   124/65   --   --    07/08/19 0420   --   100   --   120/62   --   --    07/08/19 0418   --   108   --   130/62   --   --    07/08/19 0417   --   96   18   145/60   --   --    07/08/19 0414   --   109   --   125/57   --   --    07/08/19 0413   98.1 (36.7)   --   18   --   --   --    07/08/19 0412   --   98   --   121/62   --   --    07/08/19 0411   --   113   --   120/65   --   --    07/08/19 0409   --   99   --   130/64   --   --    07/08/19 0406   --   114   --   134/87   --   --    07/08/19 0404   --   116   --   121/79   --   --    07/08/19 0340   --   111   --   --   97   --    07/08/19 0339   --   99    --   127/67   --   --    07/08/19 0331   --   --   --   --   97   78.9 (174)    07/08/19 0237   --   107   --   120/66   --   --    07/08/19 0229   97.8 (36.6)   --   18   --   --   78.9 (174)            FHR (last day)     Date/Time Fetal HR Assessment Method Fetal HR (beats/min) Fetal Heart Baseline Rate Fetal HR Variability Fetal HR Accelerations Fetal HR Decelerations Fetal HR Tracing Category    07/08/19 1036  external  145  normal range  moderate (amplitude range 6 to 25 bpm)  greater than/equal to 15 bpm  absent  --    07/08/19 1030  external  145  normal range  moderate (amplitude range 6 to 25 bpm)  greater than/equal to 15 bpm  absent  --    07/08/19 1000  external  145  normal range  moderate (amplitude range 6 to 25 bpm)  greater than/equal to 15 bpm  absent  --    07/08/19 0930  external  145  normal range  moderate (amplitude range 6 to 25 bpm)  greater than/equal to 15 bpm  absent  --    07/08/19 0900  external  145  normal range  moderate (amplitude range 6 to 25 bpm)  greater than/equal to 15 bpm  absent  --    07/08/19 0830  external  135  normal range  moderate (amplitude range 6 to 25 bpm)  greater than/equal to 15 bpm  absent  --    07/08/19 0800  external  135  normal range  moderate (amplitude range 6 to 25 bpm)  greater than/equal to 15 bpm;lasting at least 15 seconds  absent  --    07/08/19 0730  external  125  normal range  moderate (amplitude range 6 to 25 bpm)  greater than/equal to 15 bpm;lasting at least 15 seconds  absent  --    07/08/19 0700  external  125  normal range  moderate (amplitude range 6 to 25 bpm)  greater than/equal to 15 bpm;lasting at least 15 seconds  absent  --    07/08/19 0630  external  135  normal range  moderate (amplitude range 6 to 25 bpm)  greater than/equal to 15 bpm;lasting at least 15 seconds  absent  --    07/08/19 0600  external  125  normal range  moderate (amplitude range 6 to 25 bpm)  greater than/equal to 15 bpm;lasting at least 15 seconds  absent  --     07/08/19 0530  external  125  normal range  moderate (amplitude range 6 to 25 bpm)  lasting at least 15 seconds;greater than/equal to 15 bpm  absent  --    07/08/19 0500  external  130  normal range  moderate (amplitude range 6 to 25 bpm)  greater than/equal to 15 bpm;lasting at least 15 seconds  absent  --    07/08/19 0430  external  120  normal range  moderate (amplitude range 6 to 25 bpm)  greater than/equal to 15 bpm;lasting at least 15 seconds  absent  --    07/08/19 0400  external  125  normal range  moderate (amplitude range 6 to 25 bpm)  greater than/equal to 15 bpm;lasting at least 15 seconds  variable  --    07/08/19 0330  external  120  normal range  moderate (amplitude range 6 to 25 bpm)  greater than/equal to 15 bpm;lasting at least 15 seconds  absent  --    07/08/19 0230  external  125  normal range  moderate (amplitude range 6 to 25 bpm)  greater than/equal to 15 bpm;lasting at least 15 seconds  absent  --        Uterine Activity (last day)     Date/Time Method Contraction Frequency (Minutes) Contraction Duration (sec) Contraction Intensity Uterine Resting Tone Contraction Pattern    07/08/19 1036  palpation;external tocotransducer  1-3  40-70  mild by palpation  soft by palpation  Regular    07/08/19 1030  palpation;external tocotransducer  1-3  50-90  mild by palpation  soft by palpation  Regular    07/08/19 1000  palpation;external tocotransducer  1-3  60-90  mild by palpation  soft by palpation  Regular    07/08/19 0930  palpation;external tocotransducer  1-3    mild by palpation  soft by palpation  Regular    07/08/19 0900  palpation;external tocotransducer  1-3    mild by palpation  soft by palpation  Regular    07/08/19 0830  palpation;external tocotransducer  1-3    mild by palpation  soft by palpation  Regular    07/08/19 0800  palpation;external tocotransducer  1-3    mild by palpation  soft by palpation  Regular    07/08/19 0730  palpation;external tocotransducer   2-4    mild by palpation  soft by palpation  Irregular    07/08/19 0700  palpation;external tocotransducer  x10    mild by palpation  soft by palpation  --    07/08/19 0630  palpation  x8  60-90  mild by palpation  soft by palpation  Irregular    07/08/19 0600  palpation;external tocotransducer  x7    mild by palpation  soft by palpation  --    07/08/19 0530  palpation;external tocotransducer  x9  60-90  mild by palpation  soft by palpation  --    07/08/19 0500  palpation;external tocotransducer  x8  60-80  mild by palpation  soft by palpation  --    07/08/19 0430  palpation;external tocotransducer  x6    mild by palpation  soft by palpation  --    07/08/19 0400  palpation;external tocotransducer  x8  60-90  mild by palpation  soft by palpation  --    07/08/19 0330  palpation;external tocotransducer  x9  60-90  mild by palpation  soft by palpation  --    07/08/19 0230  palpation;external tocotransducer  x3  60-90  --  soft by palpation  --        Labor Pain (last day)     Date/Time Pain Rating (0-10): Rest Pain Rating (0-10): Activity Pain Management Interventions    07/08/19 1600  0  0  --    07/08/19 1400  0  0  --    07/08/19 1230  0  0  --    07/08/19 1215  0  0  --    07/08/19 1202  0  --  --    07/08/19 1200  0  0  --    07/08/19 1145  0  0  --    07/08/19 1130  0  0  --    07/08/19 1115  0  0  --    07/08/19 1100  0  0  --    07/08/19 1045  0  0  --    07/08/19 1000  0  0  --    07/08/19 0930  0  0  --    07/08/19 0800  0  0  relaxation techniques promoted    07/08/19 0730  0  0  other (see comments) has epidural    07/08/19 0501  --  --  pain management plan reviewed with patient/caregiver;quiet environment facilitated;other (see comments) epidural placed    07/08/19 0245  0  0  --    07/08/19 0229  0  0  --

## 2019-07-08 NOTE — L&D DELIVERY NOTE
ZULEYKA Lala  Vaginal Delivery Note    Delivery     Delivery: Vaginal, Spontaneous     YOB: 2017    Time of Birth:  Gestational Age 10:36 AM   39w2d     Anesthesia: Epidural     Delivering clinician: Scottie Cooley    Forceps?   No   Vacuum? No    Shoulder dystocia present: No        Delivery narrative: 28-year-old multiparous patient scheduled for induction of labor due to gestational diabetes mellitus A2 had spontaneous rupture of membranes at night before scheduled induction.  She presented received an epidural and progressed over normal labor curve to complete complete and +3 station.  The infant's head was delivered in the OP position.  Infant was bulb suctioned on the perineum.  Shoulders and body were easily gently delivered.  Cord was clamped and cut and infant was placed on maternal abdomen.  The infant had a strong cry good tone facial grimace to bulb suction.  Cord blood was obtained.  The placenta was delivered intact with three-vessel cord.  There are no lacerations.  Sponge and lap counts are correct the patient was stable at the end of the procedure.    Infant    Findings: female  infant     Infant observations: Weight: No birth weight on file.   Length:    in  Observations/Comments:         Apgars: 8   @ 1 minute /    9   @ 5 minutes   Infant Name: Adithya     Placenta, Cord, and Fluid    Placenta delivered  Spontaneous  at         Cord:    present.   Nuchal Cord?  no   Cord blood obtained: Yes    Cord gases obtained:  No    Cord gas results: Venous:  No results found for: PHCVEN    Arterial:  No results found for: PHCART     Repair    Episiotomy: Not recorded    Lacerations: No   Estimated Blood Loss:             Complications  none    Disposition  Mother to Mother Baby/Postpartum  in stable condition currently.  Baby to NBN  in stable condition currently.      Scottie Cooley MD  07/08/19  11:05 AM

## 2019-07-08 NOTE — NURSING NOTE
Called Dr. Cooley about the patient's status.  He gave orders to begin pitocin if necessary.  Patient progressing through labor on her own at this point.  Will continue to monitor and assess.

## 2019-07-08 NOTE — ANESTHESIA PREPROCEDURE EVALUATION
" Anesthesia Evaluation     Patient summary reviewed and Nursing notes reviewed   no history of anesthetic complications:  NPO Solid Status: N/A  NPO Liquid Status: N/A           Airway   Mallampati: I  TM distance: >3 FB  Neck ROM: full  No difficulty expected  Dental      Pulmonary - negative pulmonary ROS   Cardiovascular - negative cardio ROS        Neuro/Psych- negative ROS  GI/Hepatic/Renal/Endo    (+)   hepatitis C, diabetes mellitus gestational well controlled,     Musculoskeletal     (+) back pain (lower left bck, gets \"locked up\"),   Abdominal    Substance History - negative use     OB/GYN    (+) Pregnant,         Other - negative ROS                       Anesthesia Plan    ASA 2     epidural     intravenous induction   Anesthetic plan, all risks, benefits, and alternatives have been provided, discussed and informed consent has been obtained with: patient and spouse/significant other.      "

## 2019-07-08 NOTE — NURSING NOTE
Called Dr. Cooley for the patient and her wishes to be discharged in order to be present for her infant at [facility].  He states that he would prefer to give her a pass for the night, however, if she does not want to stay he would need to see her in the office by 11am in the morning.  Patient agreed to the later and states that she will go to the office in the morning.   Has agreed to discharge the patient with those terms.

## 2019-07-08 NOTE — H&P
28-year-old  5 para 2-0-2-2 presents with spontaneous rupture of membranes just after midnight last night.  She reports clear fluid.  Active fetal movement.  She was admitted and received an epidural.  She is currently 6 to 7 cm.  She is GBS negative and her blood type is a positive.  Prenatal care is affected by depression for which she takes Zoloft.  She has a history of drug abuse and has been clean for the last 2 years.  Unfortunately she contracted hepatitis C during this time.  She is also A2 diabetic.  Her  testing has been reassuring.  Currently she is comfortable with her epidural.  She is afebrile her vital signs are normal.  The infant has a baseline in the 140s and reactive with good long-term variability.  I recently saw her one variable deceleration.  I anticipate she is rapidly progressing.  Anticipate spontaneous vaginal delivery.    Scottie Cooley MD  2019  9:18 AM

## 2019-07-08 NOTE — ANESTHESIA POSTPROCEDURE EVALUATION
Patient: Delaeny Duron    Procedure Summary     Date:  07/08/19 Room / Location:      Anesthesia Start:  0352 Anesthesia Stop:  1044    Procedure:  LABOR ANALGESIA Diagnosis:      Scheduled Providers:   Provider:  Carol Chen MD    Anesthesia Type:  epidural ASA Status:  2          Anesthesia Type: epidural  Last vitals  BP   128/74 (07/08/19 1000)   Temp   98.1 °F (36.7 °C) (07/08/19 1045)   Pulse   93 (07/08/19 1000)   Resp   17 (07/08/19 1045)     SpO2   97 % (07/08/19 0340)     Post Anesthesia Care and Evaluation    Patient location during evaluation: bedside  Patient participation: complete - patient participated  Level of consciousness: awake  Pain management: adequate  Airway patency: patent  Anesthetic complications: No anesthetic complications  PONV Status: none  Cardiovascular status: acceptable  Respiratory status: acceptable  Hydration status: acceptable

## 2019-07-08 NOTE — NURSING NOTE
Discharge teaching done with patient and her significant other.  Patient states she has no further questions at this time.

## 2019-07-08 NOTE — ANESTHESIA PROCEDURE NOTES
Labor Epidural    Pre-sedation assessment completed: 7/8/2019 3:51 AM    Patient reassessed immediately prior to procedure    Patient location during procedure: OB  Start Time: 7/8/2019 3:55 AM  Stop Time: 7/8/2019 4:05 AM  Performed By  Anesthesiologist: Carol Chen MD  Preanesthetic Checklist  Completed: patient identified, site marked, surgical consent, pre-op evaluation, timeout performed, IV checked, risks and benefits discussed and monitors and equipment checked  Additional Notes  Skin infiltrated with 1% lidocaine.  Prep:  Pt Position:sitting  Sterile Tech:cap, gloves, mask and sterile barrier  Prep:chlorhexidine gluconate and isopropyl alcohol  Monitoring:blood pressure monitoring, continuous pulse oximetry and EKG (FHTs)  Epidural Block Procedure:  Approach:midline  Guidance:landmark technique and palpation technique  Location:L3-L4  Needle Type:Tuohy  Needle Gauge:17 G  Loss of Resistance Medium: saline  Loss of Resistance: 6cm  Cath Depth at skin:8 cm  Paresthesia: none  Aspiration:negative  Test Dose:negative  Number of Attempts: 1  Post Assessment:  Dressing:occlusive dressing applied and secured with tape  Pt Tolerance:patient tolerated the procedure well with no apparent complications  Complications:no

## 2019-07-08 NOTE — DISCHARGE SUMMARY
"Obstetrical Discharge Form    Primary OB Clinician: MYRNA      Gestational Age: 39 2/7    Antepartum complications: gestational diabetes, HCV RNA +, Hx of drug abuse, anemia, depression/anxiety    Date of Delivery:  2019     Delivered By: Scottie Cooley     Delivery Type: spontaneous vaginal delivery    Tubal Ligation: n/a    Baby: Liveborn female, Apgars 8/9, weight 7 #, 4 oz    Anesthesia: epidural    Intrapartum complications: None    Laceration: none      Feeding method: breast      Discharge Date: 2019; Discharge Time: 5:42 PM    /62 (BP Location: Right arm, Patient Position: Sitting)   Pulse 82   Temp 98.2 °F (36.8 °C) (Axillary)   Resp 18   Ht 167.6 cm (66\")   Wt 78.9 kg (174 lb)   LMP 2018 (Approximate)   SpO2 98%   Breastfeeding? Yes   BMI 28.08 kg/m²      Abd: soft, fundus firm, NT  Ext: no cords  Results from last 7 days   Lab Units 19  0237   HEMOGLOBIN g/dL 11.9*   Patient's  infant is being transferred to [facility].  Patient is severely anxious and requests discharge.  She is currently stable scant bleeding and a firm fundus.  Vital signs are normal and she has been ambulating without difficulty.  She is tolerating p.o. with no nausea or vomiting.    IMP:  Postpartum day 0, requests discharge   GDMA2   Anemia   Anxiety/depression   HCV carrier   Hx of drug abuse      Plan:  Plan is okay to discharge today if she will follow-up with me in the office tomorrow morning.  She will see me at 11:00 in the morning tomorrow.  Patient given written instruction sheet.  Follow-up appointment with Dr Cooley in 1 day.    Scottie Cooley MD  5:42 PM  2019  "

## 2019-07-08 NOTE — NURSING NOTE
Patient's vitals within normal limits as is her bleeding.  Patient is currently in no pain at all, ambulated to the bathroom.  Opal care provided.  New gown and pad applied.  Patient wheeled to post partum unit in bed 1122.

## 2019-07-08 NOTE — NURSING NOTE
Spoke to the patient about her discharge and the fact that though her concerns are with her infant, she still needs to take care of herself, remembering that she just gave birth.  Self-fundal massage taught to be done hourly with a discussion about the reasoning for it.  Told the patient that it is important to monitor her bleeding and that if she is soaking through more than one pad per hour than she is to return to the hospital.  Other signs and symptoms of post partum hemorrhage and a prolapsed uterus discussed.  Patient verbalized understanding and states she has no further questions at this time.

## 2019-07-09 ENCOUNTER — POSTPARTUM VISIT (OUTPATIENT)
Dept: OBSTETRICS AND GYNECOLOGY | Facility: CLINIC | Age: 29
End: 2019-07-09

## 2019-07-09 VITALS
DIASTOLIC BLOOD PRESSURE: 60 MMHG | HEIGHT: 66 IN | BODY MASS INDEX: 26.03 KG/M2 | SYSTOLIC BLOOD PRESSURE: 110 MMHG | WEIGHT: 162 LBS

## 2019-07-09 DIAGNOSIS — D50.8 IRON DEFICIENCY ANEMIA SECONDARY TO INADEQUATE DIETARY IRON INTAKE: ICD-10-CM

## 2019-07-09 DIAGNOSIS — O24.419 WHITE CLASSIFICATION A2 GESTATIONAL DIABETES MELLITUS: ICD-10-CM

## 2019-07-09 LAB
BASOPHILS # BLD AUTO: 0.04 10*3/MM3 (ref 0–0.2)
BASOPHILS NFR BLD AUTO: 0.5 % (ref 0–1.5)
EOSINOPHIL # BLD AUTO: 0.26 10*3/MM3 (ref 0–0.4)
EOSINOPHIL NFR BLD AUTO: 3 % (ref 0.3–6.2)
ERYTHROCYTE [DISTWIDTH] IN BLOOD BY AUTOMATED COUNT: 13.2 % (ref 12.3–15.4)
HCT VFR BLD AUTO: 36.7 % (ref 34–46.6)
HGB BLD-MCNC: 11.6 G/DL (ref 12–15.9)
IMM GRANULOCYTES # BLD AUTO: 0.03 10*3/MM3 (ref 0–0.05)
IMM GRANULOCYTES NFR BLD AUTO: 0.3 % (ref 0–0.5)
LYMPHOCYTES # BLD AUTO: 2.44 10*3/MM3 (ref 0.7–3.1)
LYMPHOCYTES NFR BLD AUTO: 27.8 % (ref 19.6–45.3)
MCH RBC QN AUTO: 29.6 PG (ref 26.6–33)
MCHC RBC AUTO-ENTMCNC: 31.6 G/DL (ref 31.5–35.7)
MCV RBC AUTO: 93.6 FL (ref 79–97)
MONOCYTES # BLD AUTO: 0.71 10*3/MM3 (ref 0.1–0.9)
MONOCYTES NFR BLD AUTO: 8.1 % (ref 5–12)
NEUTROPHILS # BLD AUTO: 5.3 10*3/MM3 (ref 1.7–7)
NEUTROPHILS NFR BLD AUTO: 60.3 % (ref 42.7–76)
NRBC BLD AUTO-RTO: 0 /100 WBC (ref 0–0.2)
PLATELET # BLD AUTO: 198 10*3/MM3 (ref 140–450)
RBC # BLD AUTO: 3.92 10*6/MM3 (ref 3.77–5.28)
WBC # BLD AUTO: 8.78 10*3/MM3 (ref 3.4–10.8)

## 2019-07-09 PROCEDURE — 0503F POSTPARTUM CARE VISIT: CPT | Performed by: OBSTETRICS & GYNECOLOGY

## 2019-07-09 NOTE — PROGRESS NOTES
"Delaney Duron is a 28 y.o. patient who presents for follow up of   Chief Complaint   Patient presents with   • Postpartum Care       HPI this is a 28-year-old  5 para 3-0-2-3 who is postpartum day 1 status post spontaneous vaginal delivery.  Her infant was transferred to [facility] due to desaturations with feedings.  Patient requested discharge on postpartum day 0.  She presents today for follow-up.  She has scant vaginal bleeding and no pain.  She denies any fevers.  Baby is doing better and is been moved to a stepdown unit.  Prenatal care is complicated by depression and anxiety and she is doing quite well coping.  She does take antidepressants Zoloft daily.  She denies any acute onset baby blues or depression at this time.    The following portions of the patient's history were reviewed and updated as appropriate: allergies, current medications and problem list.    Review of Systems   Constitutional: Negative for chills and fever.   Respiratory: Negative for chest tightness and shortness of breath.    Gastrointestinal: Negative for abdominal pain, diarrhea and nausea.   Genitourinary: Positive for vaginal bleeding (normal postpartum). Negative for difficulty urinating.   Psychiatric/Behavioral: Negative for dysphoric mood and suicidal ideas.       /60   Ht 167.6 cm (66\")   Wt 73.5 kg (162 lb)   LMP 2018 (Approximate)   BMI 26.15 kg/m²     Physical Exam   Constitutional: She is oriented to person, place, and time. She appears well-developed and well-nourished.   HENT:   Head: Normocephalic and atraumatic.   Pulmonary/Chest: Effort normal. No respiratory distress.   Abdominal: Soft. She exhibits mass (fundus firm, NT). She exhibits no distension. There is no tenderness. There is no rebound and no guarding.   Musculoskeletal: Normal range of motion.   Neurological: She is alert and oriented to person, place, and time.   Skin: Skin is warm and dry.   Psychiatric: She has a " normal mood and affect. Her behavior is normal. Judgment and thought content normal.   Nursing note and vitals reviewed.        Assessment/Plan    Delaney was seen today for postpartum care.    Diagnoses and all orders for this visit:    Postpartum care and examination  -     CBC & Differential    White classification A2 gestational diabetes mellitus    Iron deficiency anemia secondary to inadequate dietary iron intake  -     CBC & Differential    Check CBC today likely what a normal postpartum day #1.  Continue oral Zoloft.  Because of her history of gestational diabetes needs a 2-hour postprandial glucose at her 6-week follow-up.    Return in about 6 weeks (around 8/20/2019) for Postpartum + 2 hr PP glucose level.      Scottie Cooley MD  7/9/2019  10:51 AM

## 2019-07-19 ENCOUNTER — TELEPHONE (OUTPATIENT)
Dept: OBSTETRICS AND GYNECOLOGY | Facility: HOSPITAL | Age: 29
End: 2019-07-19

## 2019-07-19 NOTE — TELEPHONE ENCOUNTER
Breast Feeding Follow Phone Call PP Week :1    Date of Birth: [birth year] Baby's Name: Sunil Gilmore states that Bf is going well. Baby sunil feeds well at the breast. She reports that the latch is excellent. She is also pumping every 4 hours and gives Sunil pumped breast milk. Her milk is abundantly in and she reports that her breast feels full before feedings and soft afterwards. I will call her next week to check in. She denies any other questions or concerns and is thankful for the call.

## 2019-07-24 ENCOUNTER — TELEPHONE (OUTPATIENT)
Dept: OBSTETRICS AND GYNECOLOGY | Facility: HOSPITAL | Age: 29
End: 2019-07-24

## 2019-07-24 NOTE — TELEPHONE ENCOUNTER
Breast Feeding Follow Phone Call PP Week :2    Date of Birth: [birth year]  Baby's Name:   Adithya Gilmore reports that baby Adithya is still feeding well. No issues with BF or pumping. She denies any questions at this time.

## 2019-08-23 ENCOUNTER — TELEPHONE (OUTPATIENT)
Dept: OBSTETRICS AND GYNECOLOGY | Facility: HOSPITAL | Age: 29
End: 2019-08-23

## 2019-09-02 NOTE — TELEPHONE ENCOUNTER
Breast Feeding Follow Phone Call PP Week :6    Date of Birth:[birth year] Baby's Name:  Adithya Anaya            No answer

## 2019-09-10 ENCOUNTER — POSTPARTUM VISIT (OUTPATIENT)
Dept: OBSTETRICS AND GYNECOLOGY | Facility: CLINIC | Age: 29
End: 2019-09-10

## 2019-09-10 VITALS
SYSTOLIC BLOOD PRESSURE: 130 MMHG | HEIGHT: 66 IN | WEIGHT: 156 LBS | DIASTOLIC BLOOD PRESSURE: 82 MMHG | BODY MASS INDEX: 25.07 KG/M2

## 2019-09-10 DIAGNOSIS — Z13.9 SCREENING FOR CONDITION: ICD-10-CM

## 2019-09-10 LAB
B-HCG UR QL: NEGATIVE
BILIRUB BLD-MCNC: NEGATIVE MG/DL
CLARITY, POC: CLEAR
COLOR UR: YELLOW
GLUCOSE UR STRIP-MCNC: NEGATIVE MG/DL
INTERNAL NEGATIVE CONTROL: NEGATIVE
INTERNAL POSITIVE CONTROL: POSITIVE
KETONES UR QL: NEGATIVE
LEUKOCYTE EST, POC: NEGATIVE
Lab: NORMAL
NITRITE UR-MCNC: NEGATIVE MG/ML
PH UR: 5 [PH] (ref 5–8)
PROT UR STRIP-MCNC: NEGATIVE MG/DL
RBC # UR STRIP: NEGATIVE /UL
SP GR UR: 1.02 (ref 1–1.03)
UROBILINOGEN UR QL: NORMAL

## 2019-09-10 PROCEDURE — 81025 URINE PREGNANCY TEST: CPT | Performed by: OBSTETRICS & GYNECOLOGY

## 2019-09-10 PROCEDURE — 99213 OFFICE O/P EST LOW 20 MIN: CPT | Performed by: OBSTETRICS & GYNECOLOGY

## 2019-09-10 RX ORDER — PAROXETINE HYDROCHLORIDE 40 MG/1
40 TABLET, FILM COATED ORAL EVERY MORNING
Qty: 30 TABLET | Refills: 11 | Status: SHIPPED | OUTPATIENT
Start: 2019-09-10 | End: 2020-09-17 | Stop reason: SDUPTHER

## 2019-09-10 NOTE — PROGRESS NOTES
"Delaney Duron is a 28 y.o. patient who presents for follow up of   Chief Complaint   Patient presents with   • Postpartum Care       HPI 28-year-old 6-week postpartum status post spontaneous vaginal delivery presents for postpartum follow-up.  She reports depression that started about 2 weeks after delivery.  Is slightly improved now but she wants to address medically.  She is currently on Zoloft 100 mg is not does not feel it is doing well.  She denies any suicidal or homicidal ideation.  She has anxiety and panic associated with her depression.    The following portions of the patient's history were reviewed and updated as appropriate: allergies, current medications and problem list.    Review of Systems   Constitutional: Negative for appetite change, fever and unexpected weight change.   HENT: Negative for congestion and sore throat.    Respiratory: Negative for cough and shortness of breath.    Cardiovascular: Negative for chest pain and palpitations.   Gastrointestinal: Negative for abdominal distention, abdominal pain, constipation, diarrhea, nausea and vomiting.   Endocrine: Negative.    Genitourinary: Negative for dyspareunia, menstrual problem, pelvic pain and vaginal discharge.   Skin: Negative.    Neurological: Negative for dizziness and syncope.   Hematological: Negative.    Psychiatric/Behavioral: Positive for agitation, dysphoric mood and sleep disturbance. The patient is nervous/anxious.        /82   Ht 167.6 cm (66\")   Wt 70.8 kg (156 lb)   BMI 25.18 kg/m²     Physical Exam   Constitutional: She is oriented to person, place, and time. She appears well-developed and well-nourished.   HENT:   Head: Normocephalic and atraumatic.   Pulmonary/Chest: Effort normal.   Abdominal: Soft. Bowel sounds are normal. She exhibits no distension and no mass. There is no tenderness. There is no rebound and no guarding.   Genitourinary: Vagina normal and uterus normal.   Musculoskeletal: Normal range " of motion.   Neurological: She is alert and oriented to person, place, and time.   Skin: Skin is warm and dry.   Psychiatric: She has a normal mood and affect. Her behavior is normal. Judgment and thought content normal.   Nursing note and vitals reviewed.        Assessment/Plan    Delaney was seen today for postpartum care.    Diagnoses and all orders for this visit:    Postpartum depression    Screening for condition  -     POC Urinalysis Dipstick  -     POC Pregnancy, Urine    Other orders  -     PARoxetine (PAXIL) 40 MG tablet; Take 1 tablet by mouth Every Morning.    Change Zoloft to Paxil.  Plans Mirena for birth control.  Will need to check insurance benefits.    Return in about 6 weeks (around 10/22/2019) for Follow up with me.      Scottie Cooley MD  9/10/2019  3:00 PM

## 2019-09-27 ENCOUNTER — OFFICE VISIT (OUTPATIENT)
Dept: OBSTETRICS AND GYNECOLOGY | Facility: CLINIC | Age: 29
End: 2019-09-27

## 2019-09-27 VITALS
SYSTOLIC BLOOD PRESSURE: 130 MMHG | WEIGHT: 157 LBS | HEIGHT: 66 IN | BODY MASS INDEX: 25.23 KG/M2 | DIASTOLIC BLOOD PRESSURE: 70 MMHG

## 2019-09-27 DIAGNOSIS — Z30.430 ENCOUNTER FOR IUD INSERTION: Primary | ICD-10-CM

## 2019-09-27 DIAGNOSIS — R76.8 HCV ANTIBODY POSITIVE: ICD-10-CM

## 2019-09-27 LAB
B-HCG UR QL: NEGATIVE
INTERNAL NEGATIVE CONTROL: NEGATIVE
INTERNAL POSITIVE CONTROL: POSITIVE
Lab: NORMAL

## 2019-09-27 PROCEDURE — 81025 URINE PREGNANCY TEST: CPT | Performed by: OBSTETRICS & GYNECOLOGY

## 2019-09-27 PROCEDURE — 58300 INSERT INTRAUTERINE DEVICE: CPT | Performed by: OBSTETRICS & GYNECOLOGY

## 2019-09-27 NOTE — PROGRESS NOTES
Procedure: Intrauterine device insertion    Pre procedure indication 1) Desires Mirena  Post procedure indication 1) Desires Mirena    The risks, benefits, and alternatives to an intrauterine device were explained at length with the patient. All her questions were answered and consents were signed.    The patient was placed in a dorsal lithotomy position on the examining table in [facility]. A bimanual exam confirmed the uterus was normal in size, anteverted. A warmed metal speculum was inserted into the vagina and the cervix was brought into view.    The cervix was prepped with Betadine. The anterior lip was grasped with a single-tooth tenaculum. The endometrial cavity was then sounded to 8 cm without use of a dilator. This sealed IUD package was opened and the IUD was removed in a sterile fashion.    The upper edge of the depth setting the flange was set at a uterine sound measured. The  was then carefully advanced to the cervical canal into the uterus to the level of the fundus. This was then backed off about 1.5-2 cm to allow sufficient space for the arms to open. The slider was then retracted about 1 cm and deployed the device. The device was then gently advanced to the fundus. The IUD was then released by pulling the slider down all the way. The  was removed carefully from the uterus. The threads were then cut leaving 2-3 cm visible outside of the cervix.  The single-tooth tenaculum was removed from the anterior lip. Good hemostasis was noted.     All other instruments were removed from the vagina.   There were no complications.  The patient tolerated the procedure well with a minimal amount of discomfort.    The patient was counseled about the need to return in 4 weeks for string check.     She was counseled about the need to use a backup method of contraception such as condoms until her post insertion exam was performed. The patient verbalized understanding that the IUD will need to be removed  after it expires. The patient has chosen the MMIUDSELECTION: Mirena (5 year device).  The patient is counseled to contact us if she has any significant or increasing bleeding, pain, fever, chills, or other concerns. She is instructed to see a doctor right away if she believes that she may be pregnant at any time with the IUD in place.    Patient also wants to address her hepatitis C now that she is not breast-feeding.  Referral placed.    REFUGIO Cooley MD  9/27/2019  11:05 AM

## 2019-10-01 RX ORDER — SERTRALINE HYDROCHLORIDE 100 MG/1
TABLET, FILM COATED ORAL
Qty: 30 TABLET | Refills: 4 | Status: SHIPPED | OUTPATIENT
Start: 2019-10-01 | End: 2019-12-01

## 2019-10-29 ENCOUNTER — OFFICE VISIT (OUTPATIENT)
Dept: OBSTETRICS AND GYNECOLOGY | Facility: CLINIC | Age: 29
End: 2019-10-29

## 2019-10-29 VITALS
BODY MASS INDEX: 25.63 KG/M2 | DIASTOLIC BLOOD PRESSURE: 62 MMHG | SYSTOLIC BLOOD PRESSURE: 118 MMHG | WEIGHT: 159.5 LBS | HEIGHT: 66 IN

## 2019-10-29 DIAGNOSIS — Z30.431 IUD CHECK UP: ICD-10-CM

## 2019-10-29 DIAGNOSIS — O24.419 WHITE CLASSIFICATION A2 GESTATIONAL DIABETES MELLITUS: Primary | ICD-10-CM

## 2019-10-29 DIAGNOSIS — Z13.9 SCREENING FOR CONDITION: ICD-10-CM

## 2019-10-29 LAB
BILIRUB BLD-MCNC: NEGATIVE MG/DL
CLARITY, POC: CLEAR
COLOR UR: YELLOW
GLUCOSE UR STRIP-MCNC: NEGATIVE MG/DL
KETONES UR QL: NEGATIVE
LEUKOCYTE EST, POC: NEGATIVE
NITRITE UR-MCNC: NEGATIVE MG/ML
PH UR: 5 [PH] (ref 5–8)
PROT UR STRIP-MCNC: NEGATIVE MG/DL
RBC # UR STRIP: NEGATIVE /UL
SP GR UR: 1 (ref 1–1.03)
UROBILINOGEN UR QL: NORMAL

## 2019-10-29 PROCEDURE — 99213 OFFICE O/P EST LOW 20 MIN: CPT | Performed by: OBSTETRICS & GYNECOLOGY

## 2019-10-29 NOTE — PROGRESS NOTES
"      Delaney Duron is a 29 y.o. patient who presents for follow up of   Chief Complaint   Patient presents with   • Follow-up     string check        HPI patient here for a Mirena IUD string check.  In addition she is doing her 2-hour GTT follow-up from pregnancy.  She is white classification A2 gestational diabetes mellitus.  She had a Mirena placed 4 weeks ago.  She had some irregular bleeding at first but is stopped bleeding currently.  She has no other complaints.  She has no pain or no fever.    The following portions of the patient's history were reviewed and updated as appropriate: allergies, current medications and problem list.    Review of Systems   Constitutional: Negative for appetite change, fever and unexpected weight change.   HENT: Negative for congestion and sore throat.    Respiratory: Negative for cough and shortness of breath.    Cardiovascular: Negative for chest pain and palpitations.   Gastrointestinal: Negative for abdominal distention, abdominal pain, constipation, diarrhea, nausea and vomiting.   Endocrine: Negative.    Genitourinary: Negative for dyspareunia, menstrual problem, pelvic pain and vaginal discharge.   Skin: Negative.    Neurological: Negative for dizziness and syncope.   Hematological: Negative.    Psychiatric/Behavioral: Negative for dysphoric mood and sleep disturbance. The patient is not nervous/anxious.        /62   Ht 168.9 cm (66.5\")   Wt 72.3 kg (159 lb 8 oz)   LMP  (LMP Unknown)   Breastfeeding? No   BMI 25.36 kg/m²     Physical Exam   Constitutional: She is oriented to person, place, and time. She appears well-developed and well-nourished.   HENT:   Head: Normocephalic and atraumatic.   Pulmonary/Chest: Effort normal. No respiratory distress.   Abdominal: Soft. She exhibits no distension and no mass. There is no tenderness. There is no rebound and no guarding.   Genitourinary: There is no rash, tenderness or lesion on the right labia. There is no rash, " tenderness or lesion on the left labia. No erythema, tenderness or bleeding in the vagina.   There is a foreign body (strings seen) in the vagina. No signs of injury around the vagina. No vaginal discharge found.   Musculoskeletal: Normal range of motion.   Neurological: She is alert and oriented to person, place, and time.   Skin: Skin is warm and dry.   Psychiatric: She has a normal mood and affect. Her behavior is normal. Judgment and thought content normal.   Nursing note and vitals reviewed.        Assessment/Plan    Delaney was seen today for follow-up.    Diagnoses and all orders for this visit:    White classification A2 gestational diabetes mellitus  -     Glucose 2 Hour PP    IUD check up    Screening for condition  -     POC Urinalysis Dipstick    IUD appears in place.  Check 2-hour GTT.  Unfortunately the patient took the 75 g load 3 hours ago so this will be a 3-hour with a 2-hour dose.  Please evaluate lab in context.     No Follow-up on file.      Scottie Cooley MD  10/29/2019  12:43 PM

## 2019-10-30 LAB — GLUCOSE 2H P MEAL SERPL-MCNC: 72 MG/DL (ref 65–139)

## 2019-12-01 ENCOUNTER — APPOINTMENT (OUTPATIENT)
Dept: GENERAL RADIOLOGY | Facility: HOSPITAL | Age: 29
End: 2019-12-01

## 2019-12-01 ENCOUNTER — HOSPITAL ENCOUNTER (EMERGENCY)
Facility: HOSPITAL | Age: 29
Discharge: HOME | End: 2019-12-01
Attending: EMERGENCY MEDICINE | Admitting: EMERGENCY MEDICINE

## 2019-12-01 VITALS
BODY MASS INDEX: 24.91 KG/M2 | OXYGEN SATURATION: 97 % | DIASTOLIC BLOOD PRESSURE: 72 MMHG | RESPIRATION RATE: 15 BRPM | HEART RATE: 81 BPM | HEIGHT: 66 IN | WEIGHT: 155 LBS | TEMPERATURE: 97.9 F | SYSTOLIC BLOOD PRESSURE: 124 MMHG

## 2019-12-01 DIAGNOSIS — S83.91XA SPRAIN OF RIGHT KNEE, UNSPECIFIED LIGAMENT, INITIAL ENCOUNTER: Primary | ICD-10-CM

## 2019-12-01 PROCEDURE — 73562 X-RAY EXAM OF KNEE 3: CPT

## 2019-12-01 PROCEDURE — 99282 EMERGENCY DEPT VISIT SF MDM: CPT | Performed by: EMERGENCY MEDICINE

## 2019-12-01 PROCEDURE — 99283 EMERGENCY DEPT VISIT LOW MDM: CPT

## 2019-12-01 NOTE — ED PROVIDER NOTES
Subjective   History of Present Illness  History of Present Illness    Chief complaint: Knee pain    Location: Right knee    Quality/Severity: Moderate, sharp    Timing/Onset/Duration: Acute onset  days    Modifying Factors: It hurts to bear weight, feels better to remain still    Associated Symptoms: No weakness.  The patient has some numbness.  No change in color or temperature    Narrative: This 29-year-old white female twisted her right knee 2 days ago.  She presents with right knee pain.    PCP: No known provider      Review of Systems   Musculoskeletal:        Right knee pain   Neurological: Positive for numbness. Negative for weakness.        Medication List      ASK your doctor about these medications    PARoxetine 40 MG tablet  Commonly known as:  PAXIL  Take 1 tablet by mouth Every Morning.     sertraline 100 MG tablet  Commonly known as:  ZOLOFT  TAKE 1 TABLET BY MOUTH EVERY DAY          Past Medical History:   Diagnosis Date   • Gestational diabetes 2019   • Hepatitis C    • Substance abuse (CMS/HCC)     clean x3 years        No Known Allergies    No past surgical history on file.    Family History   Problem Relation Age of Onset   • No Known Problems Father    • No Known Problems Mother        Social History     Socioeconomic History   • Marital status: Single     Spouse name: Not on file   • Number of children: Not on file   • Years of education: Not on file   • Highest education level: Not on file   Tobacco Use   • Smoking status: Former Smoker     Packs/day: 0.50     Last attempt to quit: 2018     Years since quittin.0   • Smokeless tobacco: Never Used   Substance and Sexual Activity   • Alcohol use: No     Frequency: Never   • Drug use: Yes     Types: Heroin, Methamphetamines     Comment: 3 years clean   • Sexual activity: Yes     Partners: Male     Comment: last intercose 3 days ago           Objective   Physical Exam   Constitutional: She is oriented to person, place, and time. She  appears well-developed and well-nourished. No distress.   ED Triage Vitals (12/01/19 1848)  Temp: 97.9 °F (36.6 °C)  Heart Rate: 81  Resp: 15  BP: 124/72  SpO2: 97 %  Temp src: Oral  Heart Rate Source: Monitor  Patient Position: n/a  BP Location: n/a  FiO2 (%): n/a    The patient's vitals were reviewed by me.  Unless otherwise noted they are within normal limits.     Musculoskeletal:   There is tenderness upon palpation the posterior aspect of the right knee.  The capillary refill is less than 2 seconds.  The sensation is intact.  There is a normal range of motion noted.  There is no joint laxity noted.  There is 2+ dorsalis pedis and posterior tibial pulse.   Neurological: She is alert and oriented to person, place, and time.   Skin: Skin is warm and dry. Capillary refill takes less than 2 seconds. No rash noted. No erythema. No pallor.   Psychiatric: She has a normal mood and affect.   Nursing note and vitals reviewed.      Procedures           ED Course      7:29 PM, 12/01/19:  The patient's diagnosis of right knee sprain was discussed with her.  The patient should not bear weight on the right knee for 2 days and advance weightbearing as tolerated.  The patient should take Motrin or Tylenol as needed as directed for pain.  Patient should ice her right knee for 20 minutes every 2 hours while she is awake for 2 to 3 days.  The patient should follow-up with Dr. Langley in 1 week.  She should return to the emergency department if there is increased pain, numbness, tingling, weakness, change in color or temperature, worse in any way at all.  All the patient's questions were answered she will be discharged in good condition.    7:31 PM, 12/01/19:  An immobilizer was placed on the right knee by the technician.  After application the immobilizer it was assessed by me and noted to be in good position with the right lower extremity being neurovascularly intact.  The patient was fitted for crutches and given crutch  instructions.          [facility]    Final diagnoses:   Sprain of right knee, unspecified ligament, initial encounter              Del Bradley MD  12/01/19 6546

## 2019-12-02 NOTE — DISCHARGE INSTRUCTIONS
Nonweightbearing right knee for 2 days, advance as tolerated.  Take Motrin or Tylenol as needed as directed for pain.  Follow-up with Dr. Langley in 1 week.  Return to the emergency department if there is increased pain, numbness, tingling, weakness, change in color or temperature, worse in any way at all.

## 2019-12-02 NOTE — ED NOTES
R knee immobilizer applied, crutch training performed, pt ambulates well in er.     Mahin Correa RN  12/01/19 2011

## 2020-07-02 ENCOUNTER — PROCEDURE VISIT (OUTPATIENT)
Dept: OBSTETRICS AND GYNECOLOGY | Facility: CLINIC | Age: 30
End: 2020-07-02

## 2020-07-02 ENCOUNTER — OFFICE VISIT (OUTPATIENT)
Dept: OBSTETRICS AND GYNECOLOGY | Facility: CLINIC | Age: 30
End: 2020-07-02

## 2020-07-02 VITALS
BODY MASS INDEX: 29.99 KG/M2 | HEIGHT: 66 IN | SYSTOLIC BLOOD PRESSURE: 128 MMHG | WEIGHT: 186.6 LBS | DIASTOLIC BLOOD PRESSURE: 80 MMHG

## 2020-07-02 DIAGNOSIS — Z97.5 IUD (INTRAUTERINE DEVICE) IN PLACE: Primary | ICD-10-CM

## 2020-07-02 DIAGNOSIS — R10.2 PELVIC PAIN: Primary | ICD-10-CM

## 2020-07-02 DIAGNOSIS — N89.8 VAGINAL DISCHARGE: ICD-10-CM

## 2020-07-02 DIAGNOSIS — Z13.9 SCREENING FOR CONDITION: ICD-10-CM

## 2020-07-02 DIAGNOSIS — N83.209 CYST OF OVARY, UNSPECIFIED LATERALITY: ICD-10-CM

## 2020-07-02 DIAGNOSIS — N76.0 BV (BACTERIAL VAGINOSIS): ICD-10-CM

## 2020-07-02 DIAGNOSIS — B96.89 BV (BACTERIAL VAGINOSIS): ICD-10-CM

## 2020-07-02 DIAGNOSIS — N83.201 CYST OF RIGHT OVARY: ICD-10-CM

## 2020-07-02 DIAGNOSIS — Z30.431 IUD CHECK UP: ICD-10-CM

## 2020-07-02 PROCEDURE — 76830 TRANSVAGINAL US NON-OB: CPT | Performed by: NURSE PRACTITIONER

## 2020-07-02 PROCEDURE — 99214 OFFICE O/P EST MOD 30 MIN: CPT | Performed by: NURSE PRACTITIONER

## 2020-07-02 RX ORDER — METRONIDAZOLE 500 MG/1
500 TABLET ORAL 2 TIMES DAILY
Qty: 14 TABLET | Refills: 0 | Status: SHIPPED | OUTPATIENT
Start: 2020-07-02 | End: 2020-07-09

## 2020-07-02 NOTE — PROGRESS NOTES
"Subjective     Chief Complaint   Patient presents with   • Follow-up     IUD string check, having cramps, odor ( fishy ), white thick discharge, fatigue,        Delaneyaristides Duron is a 29 y.o.  whose LMP is No LMP recorded. Patient has had an implant.. She presents for IUD check today. She has the Mirena. Her IUD was inserted on  during the postpartum period. She complains today of cramping and is worried her IUD is not in the right location. She has cramping almost daily since IUD placement. She reports she feels bloated. She has irregular periods on her IUD. She also complains of vaginal discharge with odor. She has not tried anything OTC.  This problem is new to me, the examiner.     She continues to struggle with postpartum depression. She is taking Paxil and reports good relief of her symptoms. She can tell a difference if she misses her dose accidentally.  She is a stay at home mom. 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 She is not in counseling. She does not exercise.     HPI    HPI    The following portions of the patient's history were reviewed and updated as appropriate:vital signs, allergies, current medications, past medical history, past social history, past surgical history and problem list      Review of Systems     Review of Systems   Constitutional: Negative.    Respiratory: Negative.    Cardiovascular: Negative.    Gastrointestinal: Positive for abdominal distention.   Genitourinary: Positive for pelvic pain and vaginal discharge.   Musculoskeletal: Negative.    Skin: Negative.    Neurological: Negative.    Psychiatric/Behavioral: Negative.  Positive for depressed mood.       Objective      /80   Ht 167.6 cm (65.98\")   Wt 84.6 kg (186 lb 9.6 oz)   Breastfeeding No   BMI 30.13 kg/m²     Physical Exam    Physical Exam   Constitutional: She is oriented to person, place, and time. She appears " well-developed and well-nourished.   Abdominal: Soft. Bowel sounds are normal. Hernia confirmed negative in the right inguinal area and confirmed negative in the left inguinal area.   Genitourinary: Rectum normal. Pelvic exam was performed with patient supine. There is no rash, tenderness, lesion or injury on the right labia. There is no rash, tenderness, lesion or injury on the left labia. Uterus is not deviated, not enlarged, not fixed and not tender. Cervix exhibits no motion tenderness, no discharge and no friability. Right adnexum displays no mass, no tenderness and no fullness. Left adnexum displays no mass, no tenderness and no fullness. No erythema, tenderness or bleeding in the vagina. No foreign body in the vagina. No signs of injury around the vagina. Vaginal discharge found.   Genitourinary Comments: IUD strings visualized    Musculoskeletal: Normal range of motion. She exhibits no edema.   Lymphadenopathy:        Right: No inguinal adenopathy present.        Left: No inguinal adenopathy present.   Neurological: She is alert and oriented to person, place, and time.   Skin: Skin is warm and dry.   Psychiatric: She has a normal mood and affect. Her behavior is normal.   Vitals reviewed.      Lab Review   Labs: Urine pregnancy test, Urinalysis - with micro     Imaging   Ultrasound - Pelvic Vaginal    Assessment  Delaney was seen today for follow-up.    Diagnoses and all orders for this visit:    Screening for condition  -     POC Urinalysis Dipstick  -     POC Pregnancy, Urine        Additional Assessment:   1. Pelvic pain  2. IUD check  3. BV   4. Ovarian cyst   5. Postpartum depression     Plan     1.   Pelvic pain- Uncertain exact cause. Could be IUD vs. Ovarian cyst. Disc with patient since cramping has been on going since IUD placement, most likely it is caused by her IUD. US IMP:       UTERUS IS ANTEVERTED, RT OV CONTAINS A SIMPLE CYST= 3.2 X 2.5 CM, LT OV NOT SEEN/ NL ADNEXA, NO FREE FLUID.IUD NOTED  IN PLACE. Rev'd US with patient.   2.   IUD check- IUD noted in correct location. Rev'd string checks. If cyst resolves and NuSwab is normal, plan IUD removal to see if pain resolves.   3.   BV- Discharge suspicious for BV. ERX Flagyl. Check NuSwab.   4.   Ovarian cyst- Rev warn s/s. Repeat US in 1 month.   5.   Contraception: IUD   6.   STD:  Enc condom use.   7.   Smoking status: non smoker  8   BMI: Patient's Body mass index is 30.13 kg/m². BMI is above normal parameters. Recommendations include: exercise counseling and nutrition counseling.  9.   Postpartum depression- Taking Paxil. Rec counseling. Enc daily exercise and finding some time for herself. Also disc the possibility of need Gene Sight testing to see if she is on a good medication to help with PPD.     RTO 1 month for repeat US of ovarian cyst.       Sherly France, APRN  7/2/2020

## 2020-07-06 LAB
C TRACH RRNA SPEC QL NAA+PROBE: NEGATIVE
N GONORRHOEA RRNA SPEC QL NAA+PROBE: NEGATIVE
T VAGINALIS DNA SPEC QL NAA+PROBE: NEGATIVE

## 2020-07-09 PROBLEM — R10.2 PELVIC PAIN: Status: ACTIVE | Noted: 2020-07-09

## 2020-07-09 PROBLEM — N76.0 BV (BACTERIAL VAGINOSIS): Status: ACTIVE | Noted: 2020-07-09

## 2020-07-09 PROBLEM — N83.209 CYST OF OVARY: Status: ACTIVE | Noted: 2020-07-09

## 2020-07-09 PROBLEM — Z30.431 IUD CHECK UP: Status: ACTIVE | Noted: 2020-07-09

## 2020-07-09 PROBLEM — B96.89 BV (BACTERIAL VAGINOSIS): Status: ACTIVE | Noted: 2020-07-09

## 2020-07-14 LAB
A VAGINAE DNA VAG QL NAA+PROBE: ABNORMAL SCORE
BVAB2 DNA VAG QL NAA+PROBE: ABNORMAL SCORE
C ALBICANS DNA VAG QL NAA+PROBE: POSITIVE
C GLABRATA DNA VAG QL NAA+PROBE: NEGATIVE
M GENITALIUM DNA SPEC QL NAA+PROBE: NEGATIVE
M HOMINIS DNA SPEC QL NAA+PROBE: POSITIVE
MEGA1 DNA VAG QL NAA+PROBE: ABNORMAL SCORE
UREAPLASMA DNA SPEC QL NAA+PROBE: POSITIVE

## 2020-07-17 RX ORDER — FLUCONAZOLE 150 MG/1
150 TABLET ORAL ONCE
Qty: 1 TABLET | Refills: 0 | Status: SHIPPED | OUTPATIENT
Start: 2020-07-17 | End: 2020-07-17

## 2020-07-17 RX ORDER — DOXYCYCLINE 100 MG/1
100 CAPSULE ORAL 2 TIMES DAILY
Qty: 14 CAPSULE | Refills: 0 | Status: SHIPPED | OUTPATIENT
Start: 2020-07-17 | End: 2020-08-28 | Stop reason: SDUPTHER

## 2020-08-17 ENCOUNTER — OFFICE VISIT (OUTPATIENT)
Dept: OBSTETRICS AND GYNECOLOGY | Facility: CLINIC | Age: 30
End: 2020-08-17

## 2020-08-17 ENCOUNTER — PROCEDURE VISIT (OUTPATIENT)
Dept: OBSTETRICS AND GYNECOLOGY | Facility: CLINIC | Age: 30
End: 2020-08-17

## 2020-08-17 VITALS
BODY MASS INDEX: 29.86 KG/M2 | DIASTOLIC BLOOD PRESSURE: 88 MMHG | WEIGHT: 185.8 LBS | HEIGHT: 66 IN | SYSTOLIC BLOOD PRESSURE: 126 MMHG

## 2020-08-17 DIAGNOSIS — N83.201 CYST OF RIGHT OVARY: ICD-10-CM

## 2020-08-17 DIAGNOSIS — N83.201 CYST OF RIGHT OVARY: Primary | ICD-10-CM

## 2020-08-17 DIAGNOSIS — Z13.9 SCREENING FOR CONDITION: ICD-10-CM

## 2020-08-17 DIAGNOSIS — N89.8 VAGINAL DISCHARGE: ICD-10-CM

## 2020-08-17 DIAGNOSIS — R10.2 PELVIC PAIN: ICD-10-CM

## 2020-08-17 DIAGNOSIS — Z30.431 IUD CHECK UP: Primary | ICD-10-CM

## 2020-08-17 LAB
B-HCG UR QL: NEGATIVE
BILIRUB BLD-MCNC: NEGATIVE MG/DL
CLARITY, POC: CLEAR
COLOR UR: YELLOW
GLUCOSE UR STRIP-MCNC: NEGATIVE MG/DL
INTERNAL NEGATIVE CONTROL: NEGATIVE
INTERNAL POSITIVE CONTROL: POSITIVE
KETONES UR QL: NEGATIVE
LEUKOCYTE EST, POC: NEGATIVE
Lab: NORMAL
NITRITE UR-MCNC: NEGATIVE MG/ML
PH UR: 6 [PH] (ref 5–8)
PROT UR STRIP-MCNC: NEGATIVE MG/DL
RBC # UR STRIP: NEGATIVE /UL
SP GR UR: 1.02 (ref 1–1.03)
UROBILINOGEN UR QL: NORMAL

## 2020-08-17 PROCEDURE — 76830 TRANSVAGINAL US NON-OB: CPT | Performed by: NURSE PRACTITIONER

## 2020-08-17 PROCEDURE — 81025 URINE PREGNANCY TEST: CPT | Performed by: NURSE PRACTITIONER

## 2020-08-17 PROCEDURE — 99213 OFFICE O/P EST LOW 20 MIN: CPT | Performed by: NURSE PRACTITIONER

## 2020-08-17 NOTE — PROGRESS NOTES
"Subjective     Chief Complaint   Patient presents with   • Follow-up     US       Delaney Duron is a 29 y.o.  whose LMP is No LMP recorded. Patient has had an implant.. She presents today for follow up. She was seen on 20 with complaints of pelvic pain. She was noted to have a (R) ovarian cyst on US. Her NuSwab was also + for yeast, ureaplasma, and mycoplasma. She continues to have complaints of (R) sided pelvic pain with a malodorous discharge. Reports her discharge cleared up with the antibx but returned within a few days. Her pain is described as a stabbling or a stretching pain. She reports the pain is short lived but is intermittent but occurs daily. She reports the pain was very intense yesterday, to the point she was in tears.     She has the Mirena IUD for approx 1 year.      HPI    HPI    The following portions of the patient's history were reviewed and updated as appropriate:vital signs, allergies, current medications, past medical history, past social history, past surgical history and problem list      Review of Systems     Review of Systems   Constitutional: Negative.    Gastrointestinal: Negative.    Genitourinary: Positive for pelvic pain and vaginal discharge.   Musculoskeletal: Negative.    Skin: Negative.    Psychiatric/Behavioral: Negative.        Objective      /88   Ht 167.6 cm (65.98\")   Wt 84.3 kg (185 lb 12.8 oz)   Breastfeeding No   BMI 30.00 kg/m²     Physical Exam    Physical Exam   Constitutional: She is oriented to person, place, and time. She appears well-developed and well-nourished.   Pulmonary/Chest: Effort normal. No respiratory distress.   Genitourinary: Pelvic exam was performed with patient supine. There is no rash, tenderness, lesion or injury on the right labia. There is no rash, tenderness, lesion or injury on the left labia. No erythema, tenderness or bleeding in the vagina. No foreign body in the vagina. No signs of injury around the vagina. Vaginal " discharge found.   Musculoskeletal: Normal range of motion. She exhibits no edema.   Neurological: She is alert and oriented to person, place, and time.   Skin: Skin is warm and dry.   Psychiatric: She has a normal mood and affect. Her behavior is normal.   Vitals reviewed.      Lab Review   Labs: Urine pregnancy test, Urinalysis - with micro     Imaging   Ultrasound - Pelvic Vaginal (7/2/20) US IMP: UTERUS IS ANTEVERTED. RT OV CONTAINS A SIMPLE CYST= 3.2 X 2.5 CM. LT OV NOT SEEN/ NL ADNEXA. NO FREE FLUID  IUD NOTED IN PLACE    US IMP: (Today) The uterus is anteverted and appears normal in shape and contour. The IUD is seen within the endometrium. There is a simple cyst seen within the right ovary which measures 3.2 x 3.0cm. The left ovary appears WNL.       Assessment  Delaney was seen today for follow-up.    Diagnoses and all orders for this visit:    Screening for condition  -     POC Urinalysis Dipstick  -     POC Pregnancy, Urine        Additional Assessment:   1. Ovarian Cyst  2. Vaginal discharge     Plan     1. Ovarian cyst- Rev US with patient. She has a persistant (R) ovarian cyst. Disc most likely occurring as a side effect of the IUD. Rec a month of OCPs in addition to the IUD. Sample provided. Rev ACHES. Plan repeat US in 4 weeks. Rev warn s/s .  2. Vaginal discharge- Check NuSwab today. Will treat based off of swab results.   3. Scheduled for: STD Labs - Nu Swab - myco, yeast, and BV , Ultrasound of the -  PELVIC , Mammography - N/A , Bone Density Test - N/A , Additional Labs - N/A  4. Contraception: IUD, she like the IUD.   5. STD:  Enc condom use.   6.   BMI: Patient's Body mass index is 30 kg/m². BMI is above normal parameters. Recommendations include: exercise counseling and nutrition counseling.    RTO PRN or in 4 weeks for repeat US.       Sherly France, APRN  8/17/2020

## 2020-08-24 LAB
A VAGINAE DNA VAG QL NAA+PROBE: NORMAL SCORE
BVAB2 DNA VAG QL NAA+PROBE: NORMAL SCORE
C ALBICANS DNA VAG QL NAA+PROBE: NEGATIVE
C GLABRATA DNA VAG QL NAA+PROBE: NEGATIVE
M GENITALIUM DNA SPEC QL NAA+PROBE: NEGATIVE
M HOMINIS DNA SPEC QL NAA+PROBE: POSITIVE
MEGA1 DNA VAG QL NAA+PROBE: NORMAL SCORE
UREAPLASMA DNA SPEC QL NAA+PROBE: POSITIVE

## 2020-08-28 RX ORDER — DOXYCYCLINE 100 MG/1
100 CAPSULE ORAL 2 TIMES DAILY
Qty: 14 CAPSULE | Refills: 0 | Status: SHIPPED | OUTPATIENT
Start: 2020-08-28 | End: 2021-05-09

## 2020-09-17 RX ORDER — PAROXETINE HYDROCHLORIDE 40 MG/1
40 TABLET, FILM COATED ORAL EVERY MORNING
Qty: 30 TABLET | Refills: 11 | Status: SHIPPED | OUTPATIENT
Start: 2020-09-17 | End: 2021-05-09

## 2020-10-12 ENCOUNTER — PROCEDURE VISIT (OUTPATIENT)
Dept: OBSTETRICS AND GYNECOLOGY | Facility: CLINIC | Age: 30
End: 2020-10-12

## 2020-10-12 ENCOUNTER — OFFICE VISIT (OUTPATIENT)
Dept: OBSTETRICS AND GYNECOLOGY | Facility: CLINIC | Age: 30
End: 2020-10-12

## 2020-10-12 VITALS
HEIGHT: 66 IN | BODY MASS INDEX: 28.45 KG/M2 | DIASTOLIC BLOOD PRESSURE: 80 MMHG | SYSTOLIC BLOOD PRESSURE: 122 MMHG | WEIGHT: 177 LBS

## 2020-10-12 DIAGNOSIS — N83.201 CYST OF RIGHT OVARY: Primary | ICD-10-CM

## 2020-10-12 DIAGNOSIS — Z30.017 INSERTION OF NEXPLANON: Primary | ICD-10-CM

## 2020-10-12 DIAGNOSIS — Z13.9 SCREENING FOR CONDITION: ICD-10-CM

## 2020-10-12 DIAGNOSIS — Z97.5 IUD (INTRAUTERINE DEVICE) IN PLACE: ICD-10-CM

## 2020-10-12 DIAGNOSIS — R10.2 PELVIC PAIN: ICD-10-CM

## 2020-10-12 DIAGNOSIS — Z30.432 ENCOUNTER FOR IUD REMOVAL: ICD-10-CM

## 2020-10-12 LAB
BILIRUB BLD-MCNC: NEGATIVE MG/DL
CLARITY, POC: CLEAR
COLOR UR: YELLOW
GLUCOSE UR STRIP-MCNC: NEGATIVE MG/DL
KETONES UR QL: NEGATIVE
LEUKOCYTE EST, POC: NEGATIVE
NITRITE UR-MCNC: NEGATIVE MG/ML
PH UR: 6 [PH] (ref 5–8)
PROT UR STRIP-MCNC: NEGATIVE MG/DL
RBC # UR STRIP: NEGATIVE /UL
SP GR UR: 1.02 (ref 1–1.03)
UROBILINOGEN UR QL: NORMAL

## 2020-10-12 PROCEDURE — 58301 REMOVE INTRAUTERINE DEVICE: CPT | Performed by: NURSE PRACTITIONER

## 2020-10-12 PROCEDURE — 11981 INSERTION DRUG DLVR IMPLANT: CPT | Performed by: NURSE PRACTITIONER

## 2020-10-12 PROCEDURE — 99213 OFFICE O/P EST LOW 20 MIN: CPT | Performed by: NURSE PRACTITIONER

## 2020-10-12 PROCEDURE — 76830 TRANSVAGINAL US NON-OB: CPT | Performed by: NURSE PRACTITIONER

## 2020-10-12 NOTE — PROGRESS NOTES
"Subjective     Chief Complaint   Patient presents with   • Follow-up     U/S       Delaney Duron is a 29 y.o.  whose LMP is No LMP recorded. Patient has had an implant.. She presents for follow up. She continues to have complaints of pelvic pain, generalized but (L) > (R) side. She has a known (R) ovarian cyst that has been persistent on US since . She also has the Mirena IUD that was placed . At her last appt, she was given a pack of OCPs to trial for ovarian suppression but she was unable to take them d/t \"they make me feel different emotionally.\" She reports her pain is daily. She notices the pain the most on the (L) side. Sex is painful. She reports normal bowel and bladder function. She has been treated for ureaplasma and mycoplasma. She states, \"I have just not been right since the IUD was placed.\"     HPI    HPI    The following portions of the patient's history were reviewed and updated as appropriate:vital signs, allergies, current medications, past medical history, past social history, past surgical history and problem list      Review of Systems     Review of Systems   Constitutional: Negative.    Gastrointestinal: Negative.    Genitourinary: Positive for pelvic pain and vaginal discharge.   Musculoskeletal: Negative.    Skin: Negative.    Psychiatric/Behavioral: Negative.        Objective      /80   Ht 167.6 cm (65.98\")   Wt 80.3 kg (177 lb)   BMI 28.59 kg/m²     Physical Exam    Physical Exam  Vitals signs and nursing note reviewed.   Pulmonary:      Effort: Pulmonary effort is normal.      Breath sounds: Normal breath sounds.   Abdominal:      General: Abdomen is flat. There is no distension.      Palpations: Abdomen is soft. There is no mass.      Tenderness: There is abdominal tenderness (L) lower quad .   Genitourinary:     Comments: Type of IUD:  Mirena  Date of insertion:    Reason for removal:  Side effect: pelvic pain   Other relevant history/information:  " none    Procedure Time Out Documentation      Procedure Details  IUD strings visible:  yes  Local anesthesia:  None  Tenaculum used:  None  Removal:  IUD strings grasped and IUD removed intact with gentle traction.  The patient tolerated the procedure well.    All appropriate instructions regarding removal were reviewed.    Tolerated well  No apparent complications  Post procedure diagnosis : IUD removal     Plans for contraception:  nexplanon    Other follow-up needed:  none    The patient was advised to call for any fever or for prolonged or severe pain or bleeding. She was advised to use OTC ibuprofen as needed for mild to moderate pain.         Musculoskeletal: Normal range of motion.         General: No swelling.   Skin:     General: Skin is warm and dry.      Comments: Nexplanon Insertion:    Pre Dx: 1) Nexplanon Insertion   Post Dx: 1) Nexplanon Insertion     Risks, benefits, alternatives explained. All questions answered. Consents signed.  Patient placed on examined table in supine position with her nondominant (L) arm flexed at the elbow and hand resting under her head. Nexplanon to be inserted into nondominant arm. The area for insertion prepped with betadine in a sterile fashion. About 3 mL of 1% lidocaine.     The insertion site was identified approximately 8-10 cm proximal to the humoral epicondyle and 3-4 cm below with sulcus of the triceps and biceps muscle. The skin at the insertion site was stretched by my thumb and index finger. Then inserted the needle tip, bevel side up, at an angle not greater than 30° to the skin surface, just until the skin was penetrated to below the bevel. The applicator was then lowered so that it was parallel to the arm. To minimize the chance of deep incision, the skin was tented upwards with the tip of the needle. The needle was then gently inserted to the full length. Then fixed the device in place on the arm with one hand. I then slowly and carefully retracted the  needle back by retracting the release lever. The obturator was seen in the device to determine that the nexplanon had been released.     Both the patient and I were easily able to feel the device under the skin. Steri-Strips were applied at the site, and the arm was gently wrapped with gauze.    There were no complications.   The patient tolerated the procedure well.    She was given a reminder card. She was advised to use condoms as a backup method for at least a week after insertion.    Expectations, warning signs and limitations reviewed.          Neurological:      General: No focal deficit present.      Mental Status: She is oriented to person, place, and time.   Psychiatric:         Mood and Affect: Mood normal.         Behavior: Behavior normal.         Lab Review   Labs: Urine pregnancy test     Imaging   Ultrasound - Pelvic Vaginal. US IMP: UTERUS IS ANTEVERTED. RT OV CONTAINS 2 SIMPLE CYSTS. 3.1 X 2.8 CM, 2.7 X 2.8 CM. LT OV NOT SEEN/ NL ADNEXA  NO FREE FLUID. NO ENDOMETRIAL MASSES SEEN. IUD NOTED IN PLACE, SMALL AMT OF FLUID IN FUNDUS.    Ultrasound- US IMP: The uterus is anteverted and appears normal in shape and contour. The IUD is seen within the endometrium. There is a simple cyst seen within the right ovary which measures 3.2 x 3.0cm. The left ovary appears WNL.    Assessment  Delaney was seen today for follow-up.    Diagnoses and all orders for this visit:    Screening for condition  -     POC Urinalysis Dipstick        Additional Assessment:   1. Pelvic pain  2. IUD removal  3. Nexplanon insertion     Plan     1. Pelvic pain- Uncertain cause. Possible cyst vs IUD. Disc concern is that her pain is related to her IUD and not the cyst d/t the location of her pain. Dr. Kaur in the exam room to talk with patient as well. Plan IUD removal and nexplanon insertion today.   2. IUD removal- Pt tolerated well.   3. Nexplanon insertion- Pt tolerated well.   4. Scheduled for: STD Labs - N/A , Ultrasound of the  -  PELVIC , Mammography - N/A , Bone Density Test - N/A , Additional Labs - N/A  5. Contraception: Discussed contraception options at length including pills, patch, vaginal ring, POPs,  injection, implant, and IUDs.  The risks and benefits of the methods were discussed including but not limited to the increased risk of heart attack, blood clot, and stroke.  It was discussed the contraception does not protect against sexually transmitted infections and condoms are encouraged. The patient desires to start Nexplanon.   6. STD:  Enc condom use.   7. Smoking status: non smoker  8.   BMI: Patient's Body mass index is 28.59 kg/m². BMI is above normal parameters. Recommendations include: nnone.    RTO 1 month for repeat US and follow up contraception.     Sherly France, RADHA  10/12/2020

## 2022-06-22 ENCOUNTER — OFFICE VISIT (OUTPATIENT)
Dept: OBSTETRICS AND GYNECOLOGY | Facility: CLINIC | Age: 32
End: 2022-06-22

## 2022-06-22 VITALS
DIASTOLIC BLOOD PRESSURE: 100 MMHG | BODY MASS INDEX: 27 KG/M2 | SYSTOLIC BLOOD PRESSURE: 138 MMHG | WEIGHT: 172 LBS | HEIGHT: 67 IN

## 2022-06-22 DIAGNOSIS — N92.6 IRREGULAR BLEEDING: Primary | ICD-10-CM

## 2022-06-22 DIAGNOSIS — N89.8 VAGINAL DISCHARGE: ICD-10-CM

## 2022-06-22 LAB
B-HCG UR QL: NEGATIVE
BILIRUB BLD-MCNC: NEGATIVE MG/DL
CLARITY, POC: ABNORMAL
COLOR UR: ABNORMAL
EXPIRATION DATE: NORMAL
GLUCOSE UR STRIP-MCNC: NEGATIVE MG/DL
INTERNAL NEGATIVE CONTROL: NEGATIVE
INTERNAL POSITIVE CONTROL: POSITIVE
KETONES UR QL: NEGATIVE
LEUKOCYTE EST, POC: NEGATIVE
Lab: NORMAL
NITRITE UR-MCNC: NEGATIVE MG/ML
PH UR: 7.5 [PH] (ref 5–8)
PROT UR STRIP-MCNC: ABNORMAL MG/DL
RBC # UR STRIP: ABNORMAL /UL
SP GR UR: 1.02 (ref 1–1.03)
UROBILINOGEN UR QL: NORMAL

## 2022-06-22 PROCEDURE — 99214 OFFICE O/P EST MOD 30 MIN: CPT | Performed by: NURSE PRACTITIONER

## 2022-06-22 PROCEDURE — 81025 URINE PREGNANCY TEST: CPT | Performed by: NURSE PRACTITIONER

## 2022-06-22 RX ORDER — MEDROXYPROGESTERONE ACETATE 2.5 MG/1
2.5 TABLET ORAL DAILY
Qty: 30 TABLET | Refills: 1 | Status: SHIPPED | OUTPATIENT
Start: 2022-06-22 | End: 2023-02-22

## 2022-06-22 NOTE — PROGRESS NOTES
"Subjective     Chief Complaint   Patient presents with   • Follow-up     Irregular periods with nexplanon       Delaney Duron is a 31 y.o.  whose LMP is No LMP recorded (lmp unknown). Patient has had an implant.. She presents with c/o irregular bleeding on the Nexplanon. She had the Nexplanon placed 10/20. She reports she has always had cramping with the Nexplanon but the bleeding is getting worse over the last 8 months. She reports the bleeding is heavy enough that she is staining her clothes and passing large blood clots. She reports having bleeding every 2 weeks last for approx 1 week to 1 1/2 weeks. She reports she was scared to come to the office because she is worried we will make her take her contraception out and she does not want more children.     She had a Mirena IUD in 2019. It was removed d/t recurrent ovarian cyst.     She also c/o vaginal discharge with odor. Reports a foul odor.     HPI    HPI    The following portions of the patient's history were reviewed and updated as appropriate:vital signs, allergies, current medications, past medical history, past social history, past surgical history and problem list      Review of Systems     Review of Systems   Constitutional: Negative.    Gastrointestinal: Negative.    Genitourinary: Positive for menstrual problem, pelvic pain (with cycle ) and vaginal bleeding. Negative for vaginal discharge.   Musculoskeletal: Negative.    Skin: Negative.    Psychiatric/Behavioral: Negative.        Objective      /100   Ht 170.2 cm (67.01\")   Wt 78 kg (172 lb)   LMP  (LMP Unknown)   Breastfeeding No   BMI 26.93 kg/m²     Physical Exam    Physical Exam  Vitals and nursing note reviewed.   Constitutional:       Appearance: Normal appearance.   Abdominal:      Palpations: Abdomen is soft.   Genitourinary:     Labia:         Right: No rash, tenderness, lesion or injury.         Left: No tenderness, lesion or injury.       Vagina: No signs of injury and " foreign body. Bleeding present. No vaginal discharge, erythema or tenderness.   Skin:     General: Skin is warm and dry.   Neurological:      General: No focal deficit present.      Mental Status: She is alert and oriented to person, place, and time.   Psychiatric:         Mood and Affect: Mood normal.         Behavior: Behavior normal.         Lab Review   Labs: Urine pregnancy test, Urinalysis - with micro     Imaging   Ultrasound - Pelvic Vaginal    Assessment  Diagnoses and all orders for this visit:    1. Vaginal discharge (Primary)  -     NuSwab VG+ - Swab, Vagina  -     Genital Mycoplasmas THOMPSON, Swab - Swab, Vagina    2. Irregular bleeding  -     CBC & Differential  -     Ferritin  -     TSH Rfx On Abnormal To Free T4  -     POC Urinalysis Dipstick  -     POC Pregnancy, Urine    Other orders  -     medroxyPROGESTERone (Provera) 2.5 MG tablet; Take 1 tablet by mouth Daily.  Dispense: 30 tablet; Refill: 1        Assessment/Plan:   1. DUB on Nexplanon- Disc US findings with patient. Check CBC, ferritin and thyroid labs. Disc options of oral progesterone vs removal of Nexplanon and use of an alternative contraception. After a lengthy disc plan to trial Provera 2.5mg daily. If this does not help will plan Nexplanon removal with IUD insertion or Depo start.   2. Elevated BP- reports she is nervous about her visit today.Pt made aware of her BP and advised to follow up with her PCP.   3. Vaginal discharge- Check NuSwab. Await swab results for tx.     RTO COLETTE France, APRN  6/22/2022

## 2022-06-23 LAB
BASOPHILS # BLD AUTO: 0.1 X10E3/UL (ref 0–0.2)
BASOPHILS NFR BLD AUTO: 1 %
EOSINOPHIL # BLD AUTO: 0.4 X10E3/UL (ref 0–0.4)
EOSINOPHIL NFR BLD AUTO: 6 %
ERYTHROCYTE [DISTWIDTH] IN BLOOD BY AUTOMATED COUNT: 12.8 % (ref 11.7–15.4)
FERRITIN SERPL-MCNC: 24 NG/ML (ref 15–150)
HCT VFR BLD AUTO: 38.9 % (ref 34–46.6)
HGB BLD-MCNC: 12.8 G/DL (ref 11.1–15.9)
IMM GRANULOCYTES # BLD AUTO: 0 X10E3/UL (ref 0–0.1)
IMM GRANULOCYTES NFR BLD AUTO: 0 %
LYMPHOCYTES # BLD AUTO: 2.7 X10E3/UL (ref 0.7–3.1)
LYMPHOCYTES NFR BLD AUTO: 39 %
MCH RBC QN AUTO: 29.6 PG (ref 26.6–33)
MCHC RBC AUTO-ENTMCNC: 32.9 G/DL (ref 31.5–35.7)
MCV RBC AUTO: 90 FL (ref 79–97)
MONOCYTES # BLD AUTO: 0.5 X10E3/UL (ref 0.1–0.9)
MONOCYTES NFR BLD AUTO: 8 %
NEUTROPHILS # BLD AUTO: 3.3 X10E3/UL (ref 1.4–7)
NEUTROPHILS NFR BLD AUTO: 46 %
PLATELET # BLD AUTO: 299 X10E3/UL (ref 150–450)
RBC # BLD AUTO: 4.33 X10E6/UL (ref 3.77–5.28)
TSH SERPL DL<=0.005 MIU/L-ACNC: 2.7 UIU/ML (ref 0.45–4.5)
WBC # BLD AUTO: 7 X10E3/UL (ref 3.4–10.8)

## 2022-06-29 LAB
A VAGINAE DNA VAG QL NAA+PROBE: NORMAL SCORE
BVAB2 DNA VAG QL NAA+PROBE: NORMAL SCORE
C ALBICANS DNA VAG QL NAA+PROBE: NEGATIVE
C GLABRATA DNA VAG QL NAA+PROBE: NEGATIVE
C TRACH DNA VAG QL NAA+PROBE: NEGATIVE
M GENITALIUM DNA SPEC QL NAA+PROBE: NEGATIVE
M HOMINIS DNA SPEC QL NAA+PROBE: NEGATIVE
MEGA1 DNA VAG QL NAA+PROBE: NORMAL SCORE
N GONORRHOEA DNA VAG QL NAA+PROBE: NEGATIVE
T VAGINALIS DNA VAG QL NAA+PROBE: NEGATIVE
UREAPLASMA DNA SPEC QL NAA+PROBE: POSITIVE

## 2022-06-30 RX ORDER — AZITHROMYCIN 250 MG/1
TABLET, FILM COATED ORAL
Qty: 6 TABLET | Refills: 0 | Status: SHIPPED | OUTPATIENT
Start: 2022-06-30 | End: 2022-07-05

## 2023-03-16 ENCOUNTER — OFFICE VISIT (OUTPATIENT)
Dept: OBSTETRICS AND GYNECOLOGY | Facility: CLINIC | Age: 33
End: 2023-03-16
Payer: MEDICAID

## 2023-03-16 VITALS
BODY MASS INDEX: 25.9 KG/M2 | DIASTOLIC BLOOD PRESSURE: 72 MMHG | WEIGHT: 165 LBS | HEIGHT: 67 IN | SYSTOLIC BLOOD PRESSURE: 132 MMHG

## 2023-03-16 DIAGNOSIS — N83.201 CYST OF RIGHT OVARY: Primary | ICD-10-CM

## 2023-03-16 DIAGNOSIS — Z13.89 SCREENING FOR GENITOURINARY CONDITION: ICD-10-CM

## 2023-03-16 DIAGNOSIS — Z97.5 NEXPLANON IN PLACE: ICD-10-CM

## 2023-03-16 LAB
B-HCG UR QL: NEGATIVE
BILIRUB BLD-MCNC: NEGATIVE MG/DL
CLARITY, POC: CLEAR
COLOR UR: YELLOW
EXPIRATION DATE: NORMAL
GLUCOSE UR STRIP-MCNC: NEGATIVE MG/DL
INTERNAL NEGATIVE CONTROL: NORMAL
INTERNAL POSITIVE CONTROL: NORMAL
KETONES UR QL: NEGATIVE
LEUKOCYTE EST, POC: NEGATIVE
Lab: NORMAL
NITRITE UR-MCNC: NEGATIVE MG/ML
PH UR: 5 [PH] (ref 5–8)
PROT UR STRIP-MCNC: NEGATIVE MG/DL
RBC # UR STRIP: NEGATIVE /UL
SP GR UR: 1 (ref 1–1.03)
UROBILINOGEN UR QL: NORMAL

## 2023-03-16 PROCEDURE — 99213 OFFICE O/P EST LOW 20 MIN: CPT | Performed by: NURSE PRACTITIONER

## 2023-03-16 PROCEDURE — 81025 URINE PREGNANCY TEST: CPT | Performed by: NURSE PRACTITIONER

## 2023-03-16 RX ORDER — CEPHALEXIN 500 MG/1
CAPSULE ORAL
COMMUNITY
Start: 2023-03-14 | End: 2023-03-30

## 2023-03-16 RX ORDER — KETOROLAC TROMETHAMINE 10 MG/1
TABLET, FILM COATED ORAL
COMMUNITY
Start: 2023-03-14

## 2023-03-16 NOTE — PROGRESS NOTES
"Subjective     Chief Complaint   Patient presents with   • Pelvic Pain       Delaney Duron is a 32 y.o.  whose LMP is No LMP recorded (lmp unknown). Patient has had an implant.. She has the Nexplanon, it expires in 10/23. She has chronic pelvic pain, feels like that has been ongoing for several years.  She is having irregular bleeding as well. She is asking for the Nexplanon to be removed today. She was recently seen in the ER at [facility] d/t pelvic pain and was told she has a (R) adnexal cyst measuring 4.3cm. She is interested in having a partial hysterctomy. She is in a relationship and needs contraception. Her partner is considering a vasectomy.     She reports she has been clean x 5 years and is a healthy relationship currently. Reports she is very happy with her life right now. She does not desire any more children.     Disc with patient that d/t insurance restrictions, she can either address her ovarian cyst or have her nexplanon removed today. Pt desires to address her ovarian cyst.     HPI    HPI    The following portions of the patient's history were reviewed and updated as appropriate:vital signs, allergies, current medications, past medical history, past social history, past surgical history and problem list      Review of Systems     Review of Systems   Constitutional: Negative for chills, fatigue and fever.   Gastrointestinal: Negative for abdominal distention.   Genitourinary: Positive for pelvic pain and vaginal bleeding.       Objective      /72   Ht 170.2 cm (67\")   Wt 74.8 kg (165 lb)   LMP  (LMP Unknown)   Breastfeeding No   BMI 25.84 kg/m²     Physical Exam    Physical Exam  Vitals and nursing note reviewed.   Constitutional:       Appearance: Normal appearance.   Musculoskeletal:         General: Normal range of motion.   Skin:     General: Skin is warm and dry.   Neurological:      General: No focal deficit present.      Mental Status: She is alert and oriented to " person, place, and time.   Psychiatric:         Mood and Affect: Mood normal.         Behavior: Behavior normal.         Lab Review   Labs: Urine pregnancy test, Urinalysis - with micro     Imaging   Ultrasound - Pelvic Vaginal    Assessment  Diagnoses and all orders for this visit:    1. Screening for genitourinary condition (Primary)  -     POC Urinalysis Dipstick  -     POC Pregnancy, Urine        Additional Assessment:   1. (R) ovarian cyst- Appears persistent. Had a (R) ovarian cyst on US 6/22 measuring > 4 cm. CT scan this week shows a 4 cm ovarian cyst. US today shows a 3.9 cm (R) ovarian cyst. Disc case with Dr. Land. Plan cystectomy, bilateral salpingectomy and removal of nexplanon with him. Dr. Land into room to disc plan of care with patient. Pt taken to [facility] to schedule preop consult and surgery. She also understands there will be a 30 day wait period for tubal d/t insurance. Consent signed today.       Sherly France, APRN  3/16/2023   13:33 EDT

## 2023-03-30 ENCOUNTER — OFFICE VISIT (OUTPATIENT)
Dept: OBSTETRICS AND GYNECOLOGY | Facility: CLINIC | Age: 33
End: 2023-03-30
Payer: MEDICAID

## 2023-03-30 VITALS
DIASTOLIC BLOOD PRESSURE: 74 MMHG | BODY MASS INDEX: 25.74 KG/M2 | HEIGHT: 67 IN | WEIGHT: 164 LBS | SYSTOLIC BLOOD PRESSURE: 128 MMHG

## 2023-03-30 DIAGNOSIS — Z01.818 PREOP EXAMINATION: Primary | ICD-10-CM

## 2023-03-30 DIAGNOSIS — Z30.2 ENCOUNTER FOR STERILIZATION: ICD-10-CM

## 2023-03-30 DIAGNOSIS — N83.201 CYST OF RIGHT OVARY: ICD-10-CM

## 2023-03-30 DIAGNOSIS — R10.2 PELVIC PAIN: ICD-10-CM

## 2023-03-30 LAB
B-HCG UR QL: NEGATIVE
BILIRUB BLD-MCNC: NEGATIVE MG/DL
CLARITY, POC: CLEAR
COLOR UR: YELLOW
EXPIRATION DATE: NORMAL
GLUCOSE UR STRIP-MCNC: NEGATIVE MG/DL
INTERNAL NEGATIVE CONTROL: NEGATIVE
INTERNAL POSITIVE CONTROL: POSITIVE
KETONES UR QL: NEGATIVE
LEUKOCYTE EST, POC: NEGATIVE
Lab: 55
NITRITE UR-MCNC: NEGATIVE MG/ML
PH UR: 5 [PH] (ref 5–8)
PROT UR STRIP-MCNC: NEGATIVE MG/DL
RBC # UR STRIP: NEGATIVE /UL
SP GR UR: 1 (ref 1–1.03)
UROBILINOGEN UR QL: NORMAL

## 2023-03-30 RX ORDER — SULFAMETHOXAZOLE AND TRIMETHOPRIM 800; 160 MG/1; MG/1
1 TABLET ORAL EVERY 12 HOURS SCHEDULED
COMMUNITY
Start: 2023-03-18 | End: 2023-03-30

## 2023-03-30 NOTE — PROGRESS NOTES
"Here today for Pre-op. Desires salpingectomy, Cystectomy, Nexplanon removal. Still having pelvic pain. Has been sober > 6 years and can do a couple of Percocet's for post op pain. ROS otherwise neg     Initial Appt:   Delaney Duron is a 32 y.o.  whose LMP is No LMP recorded (lmp unknown). Patient has had an implant.. She has the Nexplanon, it expires in 10/23. She has chronic pelvic pain, feels like that has been ongoing for several years.  She is having irregular bleeding as well. She is asking for the Nexplanon to be removed today. She was recently seen in the ER at [facility] d/t pelvic pain and was told she has a (R) adnexal cyst measuring 4.3cm. She is interested in having a partial hysterctomy. She is in a relationship and needs contraception. Her partner is considering a vasectomy.     She reports she has been clean x 5 years and is a healthy relationship currently. Reports she is very happy with her life right now. She does not desire any more children.     Disc with patient that d/t insurance restrictions, she can either address her ovarian cyst or have her nexplanon removed today. Pt desires to address her ovarian cyst.     HPI    HPI    The following portions of the patient's history were reviewed and updated as appropriate:vital signs, allergies, current medications, past medical history, past social history, past surgical history and problem list      Review of Systems     Review of Systems   Constitutional: Negative for chills, fatigue and fever.   Gastrointestinal: Negative for abdominal distention.   Genitourinary: Positive for pelvic pain and vaginal bleeding.       Objective      /74   Ht 170.2 cm (67.01\")   Wt 74.4 kg (164 lb)   LMP  (LMP Unknown) Comment: nexplanon - in place  BMI 25.68 kg/m²     Physical Exam  Vitals: VSS; AF    General Appearance:  Awake. Alert. Well developed. Well nourished. In no acute distress.      Lungs:  ° Clear to auscultation bilaterally " without wheezes, rales or rhonchi.  Cardiovascular:  ° System: RRR, no murmur, consistent with normal pregnancy.  Abdomen:  Visual Inspection: ° Abdomen was normal on visual inspection.  Palpation: ° Abdomen was soft. ° Abdominal non-tender.    Neurological:  ° Oriented to time, place, and person.    Left Arm; Able to palpate Nexplanon     Lab Review   Labs: Urine pregnancy test, Urinalysis - with micro     Imaging   Ultrasound - Pelvic Vaginal    Left Ovary WNL   Right Ovary 3.9x3.3x3.3cm Cyst noted     Assessment  Diagnoses and all orders for this visit:    1. Preop examination (Primary)  -     POC Urinalysis Dipstick  -     POC Pregnancy, Urine    2. Cyst of right ovary    3. Pelvic pain    4. Encounter for sterilization      Pre-op today for Sterilization, R cystectomy, Possible fulguration, Pelvic Bx and Nexplanon removal in [facility]     Sterilization Consult  We discussed all other forms of contraception including pills, patch, vaginal ring, injection, implant, IUD, and vasectomy. We discussed the forms of permanent sterilization including  laparoscopic tubal occlusion/partial salpingectomy, and laparoscopic salpingectomy.  We discussed a minimal increased risk of bleeding with the salpingectomy as well as the benefits including ovarian cancer risk reduction and reduced risk of ectopic pregnancy.     We discussed a risk of regret of up to 40% in women <30 years of age.  I asked her to consider if her desires would change if something happened to her current children, if she were to divorce, or if her spouse were to die unexpectedly.  We discussed that this is a permanent procedure and that she would need in vitro fertilization at the cost of up to $20,000 per cycle if she desired a pregnancy after this procedure.    We discussed that risks of surgery also include bleeding, possible need for a transfusion (with associated risks of HIV, hepatitis, and other infectious diseases), infection requiring prolonged  hospitalization and treatment with antibiotics, damage to other structures (bowel, bladder, ureters, blood vessels) requiring further surgery necessitating a larger incision to remove or repair affected organs with subsequent poor wound healing, and persistent pain.    She was also counseled that anesthesia carries its own risks and that any major surgery carries the rare risk of blood clots, pulmonary embolism, stroke, heart attack, or death. All of the patient's questions were answered to her satisfaction and she desires to proceed with surgery.    Pt has been counseled with regards to indications, alternatives, risks, and benefits of diagnostic laparoscopy.  She understands surgery will include placement of instruments through the abdominal wall to view the contents of the abdomen and pelvis, evaluate for endometriosis with possible fulguration of lesions and biopsies, and lysis of adhesions  if present. Right Cystectomy will also be performed.       Patient was counseled on risks of Nexplanon removal, including pain, infection, bleeding, discomfort, bruising, and reaction to local anesthesia.     I spent 30 minutes caring for Delaney on this date of service. This time includes time spent by me in the following activities: preparing for the visit, reviewing tests, obtaining and/or reviewing a separately obtained history, performing a medically appropriate examination and/or evaluation, counseling and educating the patient/family/caregiver, ordering medications, tests, or procedures, documenting information in the medical record, independently interpreting results and communicating that information with the patient/family/caregiver and care coordination     Ronald Land,   3/30/2023   12:41 EDT

## 2023-03-30 NOTE — H&P (VIEW-ONLY)
"Here today for Pre-op. Desires salpingectomy, Cystectomy, Nexplanon removal. Still having pelvic pain. Has been sober > 6 years and can do a couple of Percocet's for post op pain. ROS otherwise neg     Initial Appt:   Delaney Duron is a 32 y.o.  whose LMP is No LMP recorded (lmp unknown). Patient has had an implant.. She has the Nexplanon, it expires in 10/23. She has chronic pelvic pain, feels like that has been ongoing for several years.  She is having irregular bleeding as well. She is asking for the Nexplanon to be removed today. She was recently seen in the ER at [facility] d/t pelvic pain and was told she has a (R) adnexal cyst measuring 4.3cm. She is interested in having a partial hysterctomy. She is in a relationship and needs contraception. Her partner is considering a vasectomy.     She reports she has been clean x 5 years and is a healthy relationship currently. Reports she is very happy with her life right now. She does not desire any more children.     Disc with patient that d/t insurance restrictions, she can either address her ovarian cyst or have her nexplanon removed today. Pt desires to address her ovarian cyst.     HPI    HPI    The following portions of the patient's history were reviewed and updated as appropriate:vital signs, allergies, current medications, past medical history, past social history, past surgical history and problem list      Review of Systems     Review of Systems   Constitutional: Negative for chills, fatigue and fever.   Gastrointestinal: Negative for abdominal distention.   Genitourinary: Positive for pelvic pain and vaginal bleeding.       Objective      /74   Ht 170.2 cm (67.01\")   Wt 74.4 kg (164 lb)   LMP  (LMP Unknown) Comment: nexplanon - in place  BMI 25.68 kg/m²     Physical Exam  Vitals: VSS; AF    General Appearance:  Awake. Alert. Well developed. Well nourished. In no acute distress.      Lungs:  ° Clear to auscultation bilaterally " without wheezes, rales or rhonchi.  Cardiovascular:  ° System: RRR, no murmur, consistent with normal pregnancy.  Abdomen:  Visual Inspection: ° Abdomen was normal on visual inspection.  Palpation: ° Abdomen was soft. ° Abdominal non-tender.    Neurological:  ° Oriented to time, place, and person.    Left Arm; Able to palpate Nexplanon     Lab Review   Labs: Urine pregnancy test, Urinalysis - with micro     Imaging   Ultrasound - Pelvic Vaginal    Left Ovary WNL   Right Ovary 3.9x3.3x3.3cm Cyst noted     Assessment  Diagnoses and all orders for this visit:    1. Preop examination (Primary)  -     POC Urinalysis Dipstick  -     POC Pregnancy, Urine    2. Cyst of right ovary    3. Pelvic pain    4. Encounter for sterilization      Pre-op today for Sterilization, R cystectomy, Possible fulguration, Pelvic Bx and Nexplanon removal in [facility]     Sterilization Consult  We discussed all other forms of contraception including pills, patch, vaginal ring, injection, implant, IUD, and vasectomy. We discussed the forms of permanent sterilization including  laparoscopic tubal occlusion/partial salpingectomy, and laparoscopic salpingectomy.  We discussed a minimal increased risk of bleeding with the salpingectomy as well as the benefits including ovarian cancer risk reduction and reduced risk of ectopic pregnancy.     We discussed a risk of regret of up to 40% in women <30 years of age.  I asked her to consider if her desires would change if something happened to her current children, if she were to divorce, or if her spouse were to die unexpectedly.  We discussed that this is a permanent procedure and that she would need in vitro fertilization at the cost of up to $20,000 per cycle if she desired a pregnancy after this procedure.    We discussed that risks of surgery also include bleeding, possible need for a transfusion (with associated risks of HIV, hepatitis, and other infectious diseases), infection requiring prolonged  hospitalization and treatment with antibiotics, damage to other structures (bowel, bladder, ureters, blood vessels) requiring further surgery necessitating a larger incision to remove or repair affected organs with subsequent poor wound healing, and persistent pain.    She was also counseled that anesthesia carries its own risks and that any major surgery carries the rare risk of blood clots, pulmonary embolism, stroke, heart attack, or death. All of the patient's questions were answered to her satisfaction and she desires to proceed with surgery.    Pt has been counseled with regards to indications, alternatives, risks, and benefits of diagnostic laparoscopy.  She understands surgery will include placement of instruments through the abdominal wall to view the contents of the abdomen and pelvis, evaluate for endometriosis with possible fulguration of lesions and biopsies, and lysis of adhesions  if present. Right Cystectomy will also be performed.       Patient was counseled on risks of Nexplanon removal, including pain, infection, bleeding, discomfort, bruising, and reaction to local anesthesia.     I spent 30 minutes caring for Delaney on this date of service. This time includes time spent by me in the following activities: preparing for the visit, reviewing tests, obtaining and/or reviewing a separately obtained history, performing a medically appropriate examination and/or evaluation, counseling and educating the patient/family/caregiver, ordering medications, tests, or procedures, documenting information in the medical record, independently interpreting results and communicating that information with the patient/family/caregiver and care coordination     Ronald Land,   3/30/2023   12:41 EDT

## 2023-04-12 ENCOUNTER — TELEPHONE (OUTPATIENT)
Dept: OBSTETRICS AND GYNECOLOGY | Facility: CLINIC | Age: 33
End: 2023-04-12
Payer: MEDICAID

## 2023-04-12 ENCOUNTER — PREP FOR SURGERY (OUTPATIENT)
Dept: OTHER | Facility: HOSPITAL | Age: 33
End: 2023-04-12
Payer: MEDICAID

## 2023-04-12 DIAGNOSIS — Z30.2 ENCOUNTER FOR STERILIZATION: ICD-10-CM

## 2023-04-12 DIAGNOSIS — N83.201 CYST OF RIGHT OVARY: Primary | ICD-10-CM

## 2023-04-12 RX ORDER — SODIUM CHLORIDE 0.9 % (FLUSH) 0.9 %
10 SYRINGE (ML) INJECTION AS NEEDED
OUTPATIENT
Start: 2023-04-12

## 2023-04-12 RX ORDER — SODIUM CHLORIDE 0.9 % (FLUSH) 0.9 %
10 SYRINGE (ML) INJECTION EVERY 12 HOURS SCHEDULED
OUTPATIENT
Start: 2023-04-12

## 2023-04-12 RX ORDER — SODIUM CHLORIDE 9 MG/ML
40 INJECTION, SOLUTION INTRAVENOUS AS NEEDED
OUTPATIENT
Start: 2023-04-12

## 2023-04-17 ENCOUNTER — PRE-ADMISSION TESTING (OUTPATIENT)
Dept: PREADMISSION TESTING | Facility: HOSPITAL | Age: 33
End: 2023-04-17
Payer: MEDICAID

## 2023-04-17 VITALS
OXYGEN SATURATION: 100 % | HEIGHT: 67 IN | DIASTOLIC BLOOD PRESSURE: 92 MMHG | RESPIRATION RATE: 18 BRPM | WEIGHT: 158.62 LBS | HEART RATE: 82 BPM | BODY MASS INDEX: 24.9 KG/M2 | SYSTOLIC BLOOD PRESSURE: 149 MMHG

## 2023-04-17 DIAGNOSIS — N83.201 CYST OF RIGHT OVARY: ICD-10-CM

## 2023-04-17 DIAGNOSIS — Z30.2 ENCOUNTER FOR STERILIZATION: ICD-10-CM

## 2023-04-17 LAB
ALBUMIN SERPL-MCNC: 4.5 G/DL (ref 3.5–5.2)
ALBUMIN/GLOB SERPL: 1.3 G/DL
ALP SERPL-CCNC: 77 U/L (ref 39–117)
ALT SERPL W P-5'-P-CCNC: 33 U/L (ref 1–33)
ANION GAP SERPL CALCULATED.3IONS-SCNC: 12.2 MMOL/L (ref 5–15)
AST SERPL-CCNC: 30 U/L (ref 1–32)
BASOPHILS # BLD AUTO: 0.04 10*3/MM3 (ref 0–0.2)
BASOPHILS NFR BLD AUTO: 0.7 % (ref 0–1.5)
BILIRUB SERPL-MCNC: 0.5 MG/DL (ref 0–1.2)
BUN SERPL-MCNC: 9 MG/DL (ref 6–20)
BUN/CREAT SERPL: 11.5 (ref 7–25)
CALCIUM SPEC-SCNC: 9.8 MG/DL (ref 8.6–10.5)
CHLORIDE SERPL-SCNC: 101 MMOL/L (ref 98–107)
CO2 SERPL-SCNC: 23.8 MMOL/L (ref 22–29)
CREAT SERPL-MCNC: 0.78 MG/DL (ref 0.57–1)
DEPRECATED RDW RBC AUTO: 53 FL (ref 37–54)
EGFRCR SERPLBLD CKD-EPI 2021: 104.9 ML/MIN/1.73
EOSINOPHIL # BLD AUTO: 0.1 10*3/MM3 (ref 0–0.4)
EOSINOPHIL NFR BLD AUTO: 1.7 % (ref 0.3–6.2)
ERYTHROCYTE [DISTWIDTH] IN BLOOD BY AUTOMATED COUNT: 16.5 % (ref 12.3–15.4)
GLOBULIN UR ELPH-MCNC: 3.5 GM/DL
GLUCOSE SERPL-MCNC: 92 MG/DL (ref 65–99)
HCT VFR BLD AUTO: 44.5 % (ref 34–46.6)
HGB BLD-MCNC: 13.8 G/DL (ref 12–15.9)
IMM GRANULOCYTES # BLD AUTO: 0.01 10*3/MM3 (ref 0–0.05)
IMM GRANULOCYTES NFR BLD AUTO: 0.2 % (ref 0–0.5)
LYMPHOCYTES # BLD AUTO: 1.14 10*3/MM3 (ref 0.7–3.1)
LYMPHOCYTES NFR BLD AUTO: 19.8 % (ref 19.6–45.3)
MCH RBC QN AUTO: 27.2 PG (ref 26.6–33)
MCHC RBC AUTO-ENTMCNC: 31 G/DL (ref 31.5–35.7)
MCV RBC AUTO: 87.8 FL (ref 79–97)
MONOCYTES # BLD AUTO: 0.45 10*3/MM3 (ref 0.1–0.9)
MONOCYTES NFR BLD AUTO: 7.8 % (ref 5–12)
NEUTROPHILS NFR BLD AUTO: 4.01 10*3/MM3 (ref 1.7–7)
NEUTROPHILS NFR BLD AUTO: 69.8 % (ref 42.7–76)
NRBC BLD AUTO-RTO: 0 /100 WBC (ref 0–0.2)
PLATELET # BLD AUTO: 334 10*3/MM3 (ref 140–450)
PMV BLD AUTO: 10.3 FL (ref 6–12)
POTASSIUM SERPL-SCNC: 4.3 MMOL/L (ref 3.5–5.2)
PROT SERPL-MCNC: 8 G/DL (ref 6–8.5)
RBC # BLD AUTO: 5.07 10*6/MM3 (ref 3.77–5.28)
SODIUM SERPL-SCNC: 137 MMOL/L (ref 136–145)
WBC NRBC COR # BLD: 5.75 10*3/MM3 (ref 3.4–10.8)

## 2023-04-17 PROCEDURE — 80053 COMPREHEN METABOLIC PANEL: CPT | Performed by: STUDENT IN AN ORGANIZED HEALTH CARE EDUCATION/TRAINING PROGRAM

## 2023-04-17 PROCEDURE — 85025 COMPLETE CBC W/AUTO DIFF WBC: CPT | Performed by: STUDENT IN AN ORGANIZED HEALTH CARE EDUCATION/TRAINING PROGRAM

## 2023-04-17 PROCEDURE — 36415 COLL VENOUS BLD VENIPUNCTURE: CPT

## 2023-04-17 NOTE — DISCHARGE INSTRUCTIONS
PRE-ADMISSION TESTING INSTRUCTIONS FOR ADULTS    Take these medications the morning of surgery with a small sip of water:      Do not take any insulin or diabetes medications the morning of surgery.      No aspirin, advil, aleve, ibuprofen, naproxen, diet pills, decongestants, or herbal/vitamins for a week prior to surgery.       Tylenol/Acetaminophen is okay to take if needed.    General Instructions:    DO NOT EAT SOLID FOOD AFTER MIDNIGHT THE NIGHT BEFORE SURGERY. No gum, mints, or hard candy after midnight the night before surgery.  You may drink clear liquids the day of surgery up until 2 hours before your arrival time.  Clear liquids are liquids you can see through. Nothing RED in color.    Plain water    Sports drinks      Gelatin (Jell-O)  Fruit juices without pulp such as white grape juice and apple juice  Popsicles that contain no fruit or yogurt  Tea or coffee (no cream or milk added)    It is beneficial for you to have a clear drink that contains carbohydrates 2 hours before your arrival time.  We suggest a 20 ounce bottle of Gatorade or Powerade for non-diabetic patients or a 20 ounce bottle of Gatorade Zero or Powerade Zero for diabetic patients.     Patients who avoid smoking, chewing tobacco and alcohol for 4 weeks prior to surgery have a reduced risk of post-operative complications.  If at all possible, quit smoking as many days before surgery as you can.    Do not smoke, use chewing tobacco or drink alcohol the day of surgery    Bring your C-PAP/ BI-PAP machine if you use one.  Wear clean comfortable clothes.  Do not wear contact lenses, lotion, deodorant, or make-up.  Bring a case for your glasses if applicable. You may brush your teeth the morning of surgery.  You may wear dentures/partials, do not put adhesive/glue on them.  Leave all other jewelry and valuables at home.      Preventing a Surgical Site Infection:    Shower the night before and on the morning of surgery using the chlorhexidine  soap you were given.  Use a clean washcloth with the soap.  Place clean sheets on your bed after showering the night before surgery. Do not use the CHG soap on your hair, face, or private areas. Wash your body gently for five (5) minutes. Do not scrub your skin.  Dry with a clean towel and dress in clean clothing.  Do not shave the surgical area for 10 days-2 weeks prior to surgery  because the razor can irritate skin and make it easier to develop an infection.  Make sure you, your family, and all healthcare providers clean their hands with soap and water or an alcohol based hand  before caring for you or your wound.      Day of surgery:    Your surgeon’s office will advise you of your arrival time for the day of surgery.    Upon arrival, a Pre-op nurse and Anesthesia provider will review your health history, obtain vital signs, and answer questions you may have. The anesthesia provider will also discuss the type of anesthesia that will be needed for your procedure, which may include general anesthesia. The only belongings needed at this time will be your home medications and if applicable your C-PAP/BI-PAP machine.  If you are staying overnight your family can leave the rest of your belongings in the car and bring them to your room later.  A Pre-op nurse will start an IV and you may receive medication in preparation for surgery, including something to help you relax.  Your family will be able to see you in the Pre-op area.  While you are in surgery your family should notify the waiting room  if they leave the waiting room area and provide a contact phone number.    IF you have any questions, you can call the Pre-Admission Department at [phone] or your surgeon's office.  Notify your surgeon if  you become sick, have a fever, productive cough, or cannot be here the day of surgery    Please be aware that surgery does come with discomfort.  We want to make every effort to control your  discomfort so please discuss any uncontrolled symptoms with your nurse.   Your doctor will most likely have prescribed pain medications.      If you are going home after surgery, you will receive individualized written care instructions before being discharged.  A responsible adult (over the age of 18) must drive you to and from the hospital on the day of your surgery and stay with you for 24 hours after anesthesia.    If you are staying overnight following surgery, you will be transported to your hospital room following the recovery period.  [facility] has all private rooms.    You may receive a survey regarding the care you received. Your feedback is very important and will be used to collect the necessary data to help us to continue to provide excellent care.     Deductibles and co-payments are collected on the day of service. Please be prepared to pay the required co-pay, deductible or deposit on the day of service as defined by your plan.

## 2023-04-21 ENCOUNTER — ANESTHESIA EVENT (OUTPATIENT)
Dept: PERIOP | Facility: HOSPITAL | Age: 33
End: 2023-04-21
Payer: MEDICAID

## 2023-04-24 ENCOUNTER — HOSPITAL ENCOUNTER (OUTPATIENT)
Facility: HOSPITAL | Age: 33
Discharge: HOME | End: 2023-04-24
Attending: STUDENT IN AN ORGANIZED HEALTH CARE EDUCATION/TRAINING PROGRAM | Admitting: STUDENT IN AN ORGANIZED HEALTH CARE EDUCATION/TRAINING PROGRAM
Payer: MEDICAID

## 2023-04-24 ENCOUNTER — ANESTHESIA (OUTPATIENT)
Dept: PERIOP | Facility: HOSPITAL | Age: 33
End: 2023-04-24
Payer: MEDICAID

## 2023-04-24 VITALS
DIASTOLIC BLOOD PRESSURE: 79 MMHG | SYSTOLIC BLOOD PRESSURE: 117 MMHG | OXYGEN SATURATION: 97 % | RESPIRATION RATE: 18 BRPM | WEIGHT: 160.85 LBS | BODY MASS INDEX: 25.19 KG/M2 | HEART RATE: 62 BPM | TEMPERATURE: 97.4 F

## 2023-04-24 DIAGNOSIS — N83.201 CYST OF RIGHT OVARY: ICD-10-CM

## 2023-04-24 DIAGNOSIS — Z30.2 ENCOUNTER FOR STERILIZATION: ICD-10-CM

## 2023-04-24 LAB — HCG SERPL QL: NEGATIVE

## 2023-04-24 PROCEDURE — 25010000002 SUCCINYLCHOLINE PER 20 MG: Performed by: NURSE ANESTHETIST, CERTIFIED REGISTERED

## 2023-04-24 PROCEDURE — 25010000002 FENTANYL CITRATE (PF) 50 MCG/ML SOLUTION: Performed by: NURSE ANESTHETIST, CERTIFIED REGISTERED

## 2023-04-24 PROCEDURE — 25010000002 PROPOFOL 200 MG/20ML EMULSION: Performed by: NURSE ANESTHETIST, CERTIFIED REGISTERED

## 2023-04-24 PROCEDURE — 25010000002 MIDAZOLAM PER 1MG: Performed by: REGISTERED NURSE

## 2023-04-24 PROCEDURE — 58662 LAPAROSCOPY EXCISE LESIONS: CPT | Performed by: SPECIALIST/TECHNOLOGIST, OTHER

## 2023-04-24 PROCEDURE — 58662 LAPAROSCOPY EXCISE LESIONS: CPT | Performed by: STUDENT IN AN ORGANIZED HEALTH CARE EDUCATION/TRAINING PROGRAM

## 2023-04-24 PROCEDURE — 84703 CHORIONIC GONADOTROPIN ASSAY: CPT | Performed by: NURSE ANESTHETIST, CERTIFIED REGISTERED

## 2023-04-24 PROCEDURE — 25010000002 ONDANSETRON PER 1 MG: Performed by: NURSE ANESTHETIST, CERTIFIED REGISTERED

## 2023-04-24 PROCEDURE — 58661 LAPAROSCOPY REMOVE ADNEXA: CPT | Performed by: STUDENT IN AN ORGANIZED HEALTH CARE EDUCATION/TRAINING PROGRAM

## 2023-04-24 PROCEDURE — 25010000002 KETOROLAC TROMETHAMINE PER 15 MG: Performed by: NURSE ANESTHETIST, CERTIFIED REGISTERED

## 2023-04-24 PROCEDURE — 25010000002 NEOSTIGMINE 10 MG/10ML SOLUTION: Performed by: NURSE ANESTHETIST, CERTIFIED REGISTERED

## 2023-04-24 PROCEDURE — 25010000002 DEXAMETHASONE PER 1 MG: Performed by: REGISTERED NURSE

## 2023-04-24 PROCEDURE — 25010000002 ONDANSETRON PER 1 MG: Performed by: REGISTERED NURSE

## 2023-04-24 PROCEDURE — 88302 TISSUE EXAM BY PATHOLOGIST: CPT | Performed by: STUDENT IN AN ORGANIZED HEALTH CARE EDUCATION/TRAINING PROGRAM

## 2023-04-24 PROCEDURE — 88305 TISSUE EXAM BY PATHOLOGIST: CPT | Performed by: STUDENT IN AN ORGANIZED HEALTH CARE EDUCATION/TRAINING PROGRAM

## 2023-04-24 PROCEDURE — 58661 LAPAROSCOPY REMOVE ADNEXA: CPT | Performed by: SPECIALIST/TECHNOLOGIST, OTHER

## 2023-04-24 RX ORDER — SODIUM CHLORIDE, SODIUM LACTATE, POTASSIUM CHLORIDE, CALCIUM CHLORIDE 600; 310; 30; 20 MG/100ML; MG/100ML; MG/100ML; MG/100ML
9 INJECTION, SOLUTION INTRAVENOUS CONTINUOUS
Status: DISCONTINUED | OUTPATIENT
Start: 2023-04-24 | End: 2023-04-24 | Stop reason: HOSPADM

## 2023-04-24 RX ORDER — SODIUM CHLORIDE 0.9 % (FLUSH) 0.9 %
10 SYRINGE (ML) INJECTION EVERY 12 HOURS SCHEDULED
Status: DISCONTINUED | OUTPATIENT
Start: 2023-04-24 | End: 2023-04-24 | Stop reason: HOSPADM

## 2023-04-24 RX ORDER — PROPOFOL 10 MG/ML
INJECTION, EMULSION INTRAVENOUS AS NEEDED
Status: DISCONTINUED | OUTPATIENT
Start: 2023-04-24 | End: 2023-04-24 | Stop reason: SURG

## 2023-04-24 RX ORDER — IBUPROFEN 800 MG/1
800 TABLET ORAL EVERY 8 HOURS PRN
Qty: 30 TABLET | Refills: 0 | Status: SHIPPED | OUTPATIENT
Start: 2023-04-24

## 2023-04-24 RX ORDER — SODIUM CHLORIDE 9 MG/ML
40 INJECTION, SOLUTION INTRAVENOUS AS NEEDED
Status: DISCONTINUED | OUTPATIENT
Start: 2023-04-24 | End: 2023-04-24 | Stop reason: HOSPADM

## 2023-04-24 RX ORDER — GLYCOPYRROLATE 0.2 MG/ML
INJECTION INTRAMUSCULAR; INTRAVENOUS AS NEEDED
Status: DISCONTINUED | OUTPATIENT
Start: 2023-04-24 | End: 2023-04-24 | Stop reason: SURG

## 2023-04-24 RX ORDER — LIDOCAINE HYDROCHLORIDE 20 MG/ML
INJECTION, SOLUTION INFILTRATION; PERINEURAL AS NEEDED
Status: DISCONTINUED | OUTPATIENT
Start: 2023-04-24 | End: 2023-04-24 | Stop reason: SURG

## 2023-04-24 RX ORDER — FAMOTIDINE 10 MG/ML
20 INJECTION, SOLUTION INTRAVENOUS
Status: COMPLETED | OUTPATIENT
Start: 2023-04-24 | End: 2023-04-24

## 2023-04-24 RX ORDER — DEXMEDETOMIDINE HYDROCHLORIDE 100 UG/ML
INJECTION, SOLUTION INTRAVENOUS AS NEEDED
Status: DISCONTINUED | OUTPATIENT
Start: 2023-04-24 | End: 2023-04-24 | Stop reason: SURG

## 2023-04-24 RX ORDER — SODIUM CHLORIDE 0.9 % (FLUSH) 0.9 %
10 SYRINGE (ML) INJECTION AS NEEDED
Status: DISCONTINUED | OUTPATIENT
Start: 2023-04-24 | End: 2023-04-24 | Stop reason: HOSPADM

## 2023-04-24 RX ORDER — DEXAMETHASONE SODIUM PHOSPHATE 4 MG/ML
4 INJECTION, SOLUTION INTRA-ARTICULAR; INTRALESIONAL; INTRAMUSCULAR; INTRAVENOUS; SOFT TISSUE ONCE AS NEEDED
Status: COMPLETED | OUTPATIENT
Start: 2023-04-24 | End: 2023-04-24

## 2023-04-24 RX ORDER — ONDANSETRON 2 MG/ML
4 INJECTION INTRAMUSCULAR; INTRAVENOUS ONCE AS NEEDED
Status: COMPLETED | OUTPATIENT
Start: 2023-04-24 | End: 2023-04-24

## 2023-04-24 RX ORDER — MAGNESIUM HYDROXIDE 1200 MG/15ML
LIQUID ORAL AS NEEDED
Status: DISCONTINUED | OUTPATIENT
Start: 2023-04-24 | End: 2023-04-24 | Stop reason: HOSPADM

## 2023-04-24 RX ORDER — KETOROLAC TROMETHAMINE 30 MG/ML
INJECTION, SOLUTION INTRAMUSCULAR; INTRAVENOUS AS NEEDED
Status: DISCONTINUED | OUTPATIENT
Start: 2023-04-24 | End: 2023-04-24 | Stop reason: SURG

## 2023-04-24 RX ORDER — ONDANSETRON 4 MG/1
4 TABLET, ORALLY DISINTEGRATING ORAL EVERY 8 HOURS PRN
Qty: 10 TABLET | Refills: 0 | Status: SHIPPED | OUTPATIENT
Start: 2023-04-24

## 2023-04-24 RX ORDER — LIDOCAINE HYDROCHLORIDE 10 MG/ML
0.5 INJECTION, SOLUTION EPIDURAL; INFILTRATION; INTRACAUDAL; PERINEURAL ONCE AS NEEDED
Status: DISCONTINUED | OUTPATIENT
Start: 2023-04-24 | End: 2023-04-24 | Stop reason: HOSPADM

## 2023-04-24 RX ORDER — NEOSTIGMINE METHYLSULFATE 1 MG/ML
INJECTION, SOLUTION INTRAVENOUS AS NEEDED
Status: DISCONTINUED | OUTPATIENT
Start: 2023-04-24 | End: 2023-04-24 | Stop reason: SURG

## 2023-04-24 RX ORDER — SODIUM CHLORIDE, SODIUM LACTATE, POTASSIUM CHLORIDE, CALCIUM CHLORIDE 600; 310; 30; 20 MG/100ML; MG/100ML; MG/100ML; MG/100ML
100 INJECTION, SOLUTION INTRAVENOUS CONTINUOUS
Status: DISCONTINUED | OUTPATIENT
Start: 2023-04-24 | End: 2023-04-24 | Stop reason: HOSPADM

## 2023-04-24 RX ORDER — FENTANYL CITRATE 50 UG/ML
INJECTION, SOLUTION INTRAMUSCULAR; INTRAVENOUS AS NEEDED
Status: DISCONTINUED | OUTPATIENT
Start: 2023-04-24 | End: 2023-04-24 | Stop reason: SURG

## 2023-04-24 RX ORDER — MIDAZOLAM HYDROCHLORIDE 2 MG/2ML
1 INJECTION, SOLUTION INTRAMUSCULAR; INTRAVENOUS
Status: DISCONTINUED | OUTPATIENT
Start: 2023-04-24 | End: 2023-04-24 | Stop reason: HOSPADM

## 2023-04-24 RX ORDER — OXYCODONE HYDROCHLORIDE AND ACETAMINOPHEN 5; 325 MG/1; MG/1
1 TABLET ORAL EVERY 4 HOURS PRN
Qty: 10 TABLET | Refills: 0 | Status: SHIPPED | OUTPATIENT
Start: 2023-04-24

## 2023-04-24 RX ORDER — KETAMINE HYDROCHLORIDE 10 MG/ML
INJECTION INTRAMUSCULAR; INTRAVENOUS AS NEEDED
Status: DISCONTINUED | OUTPATIENT
Start: 2023-04-24 | End: 2023-04-24 | Stop reason: SURG

## 2023-04-24 RX ORDER — ROCURONIUM BROMIDE 10 MG/ML
INJECTION, SOLUTION INTRAVENOUS AS NEEDED
Status: DISCONTINUED | OUTPATIENT
Start: 2023-04-24 | End: 2023-04-24 | Stop reason: SURG

## 2023-04-24 RX ORDER — SUCCINYLCHOLINE CHLORIDE 20 MG/ML
INJECTION INTRAMUSCULAR; INTRAVENOUS AS NEEDED
Status: DISCONTINUED | OUTPATIENT
Start: 2023-04-24 | End: 2023-04-24 | Stop reason: SURG

## 2023-04-24 RX ORDER — BUPIVACAINE HYDROCHLORIDE AND EPINEPHRINE 2.5; 5 MG/ML; UG/ML
INJECTION, SOLUTION INFILTRATION; PERINEURAL AS NEEDED
Status: DISCONTINUED | OUTPATIENT
Start: 2023-04-24 | End: 2023-04-24 | Stop reason: HOSPADM

## 2023-04-24 RX ADMIN — KETOROLAC TROMETHAMINE 30 MG: 30 INJECTION, SOLUTION INTRAMUSCULAR; INTRAVENOUS at 11:15

## 2023-04-24 RX ADMIN — ROCURONIUM BROMIDE 20 MG: 10 INJECTION INTRAVENOUS at 10:45

## 2023-04-24 RX ADMIN — DEXMEDETOMIDINE 12 MCG: 100 INJECTION, SOLUTION, CONCENTRATE INTRAVENOUS at 10:33

## 2023-04-24 RX ADMIN — LIDOCAINE HYDROCHLORIDE 100 MG: 20 INJECTION, SOLUTION INFILTRATION; PERINEURAL at 10:33

## 2023-04-24 RX ADMIN — DEXMEDETOMIDINE 8 MCG: 100 INJECTION, SOLUTION, CONCENTRATE INTRAVENOUS at 10:45

## 2023-04-24 RX ADMIN — FAMOTIDINE 20 MG: 10 INJECTION, SOLUTION INTRAVENOUS at 10:19

## 2023-04-24 RX ADMIN — PROPOFOL 150 MG: 10 INJECTION, EMULSION INTRAVENOUS at 10:33

## 2023-04-24 RX ADMIN — GLYCOPYRROLATE 0.2 MG: 0.2 INJECTION INTRAMUSCULAR; INTRAVENOUS at 11:00

## 2023-04-24 RX ADMIN — KETAMINE HYDROCHLORIDE 20 MG: 10 INJECTION INTRAMUSCULAR; INTRAVENOUS at 10:33

## 2023-04-24 RX ADMIN — SUCCINYLCHOLINE CHLORIDE 140 MG: 20 INJECTION, SOLUTION INTRAMUSCULAR; INTRAVENOUS at 10:34

## 2023-04-24 RX ADMIN — KETAMINE HYDROCHLORIDE 10 MG: 10 INJECTION INTRAMUSCULAR; INTRAVENOUS at 10:45

## 2023-04-24 RX ADMIN — KETAMINE HYDROCHLORIDE 10 MG: 10 INJECTION INTRAMUSCULAR; INTRAVENOUS at 10:54

## 2023-04-24 RX ADMIN — SODIUM CHLORIDE, POTASSIUM CHLORIDE, SODIUM LACTATE AND CALCIUM CHLORIDE 9 ML/HR: 600; 310; 30; 20 INJECTION, SOLUTION INTRAVENOUS at 10:18

## 2023-04-24 RX ADMIN — DEXMEDETOMIDINE 8 MCG: 100 INJECTION, SOLUTION, CONCENTRATE INTRAVENOUS at 10:55

## 2023-04-24 RX ADMIN — GLYCOPYRROLATE 0.6 MG: 0.2 INJECTION INTRAMUSCULAR; INTRAVENOUS at 11:16

## 2023-04-24 RX ADMIN — ONDANSETRON 4 MG: 2 INJECTION INTRAMUSCULAR; INTRAVENOUS at 10:19

## 2023-04-24 RX ADMIN — DEXAMETHASONE SODIUM PHOSPHATE 4 MG: 4 INJECTION, SOLUTION INTRAMUSCULAR; INTRAVENOUS at 10:19

## 2023-04-24 RX ADMIN — NEOSTIGMINE METHYLSULFATE 4 MG: 0.5 INJECTION INTRAVENOUS at 11:16

## 2023-04-24 RX ADMIN — FENTANYL CITRATE 25 MCG: 50 INJECTION, SOLUTION INTRAMUSCULAR; INTRAVENOUS at 10:55

## 2023-04-24 RX ADMIN — MIDAZOLAM HYDROCHLORIDE 1 MG: 1 INJECTION, SOLUTION INTRAMUSCULAR; INTRAVENOUS at 10:25

## 2023-04-24 RX ADMIN — ONDANSETRON 4 MG: 2 INJECTION INTRAMUSCULAR; INTRAVENOUS at 11:48

## 2023-04-24 RX ADMIN — DEXMEDETOMIDINE 8 MCG: 100 INJECTION, SOLUTION, CONCENTRATE INTRAVENOUS at 10:50

## 2023-04-24 NOTE — ANESTHESIA POSTPROCEDURE EVALUATION
Patient: Delaney Duron    Procedure Summary     Date: 04/24/23 Room / Location:  [facility] OR [facility] /  [facility]    Anesthesia Start: 1028 Anesthesia Stop: 1130    Procedures:       Bilateral salpingectomy laparoscopic, Right Cystectomy (Abdomen)      REMOVAL OF CONTRACEPTIVE CAPSULE (Left: Arm Upper) Diagnosis:       Cyst of right ovary      Encounter for sterilization      (Cyst of right ovary [N83.201])      (Encounter for sterilization [Z30.2])    Surgeons: Ronald Land DO Provider: Cassie Bernabe CRNA    Anesthesia Type: general ASA Status: 2          Anesthesia Type: general    Vitals  Vitals Value Taken Time   /77 04/24/23 1205   Temp 97.5 °F (36.4 °C) 04/24/23 1128   Pulse 73 04/24/23 1209   Resp 16 04/24/23 1205   SpO2 100 % 04/24/23 1209   Vitals shown include unvalidated device data.        Post Anesthesia Care and Evaluation    Patient location during evaluation: PHASE II  Patient participation: complete - patient participated  Level of consciousness: awake  Pain score: 1  Pain management: adequate    Airway patency: patent  Anesthetic complications: No anesthetic complications  PONV Status: none  Cardiovascular status: acceptable  Respiratory status: acceptable  Hydration status: acceptable

## 2023-04-24 NOTE — ANESTHESIA PROCEDURE NOTES
Airway  Urgency: elective    Date/Time: 4/24/2023 10:36 AM  Airway not difficult    General Information and Staff    Patient location during procedure: OR  CRNA/CAA: Cassie Bernabe CRNA  SRNA: Malou Lai SRNA  Indications and Patient Condition  Indications for airway management: airway protection    Preoxygenated: yes  Mask difficulty assessment: 1 - vent by mask    Final Airway Details  Final airway type: endotracheal airway      Successful airway: ETT  Cuffed: yes   Successful intubation technique: direct laryngoscopy  Endotracheal tube insertion site: oral  Blade: Velasquez  Blade size: 3  ETT size (mm): 7.0  Cormack-Lehane Classification: grade I - full view of glottis  Placement verified by: chest auscultation and capnometry   Measured from: lips  Number of attempts at approach: 1  Assessment: lips, teeth, and gum same as pre-op and atraumatic intubation

## 2023-04-24 NOTE — INTERVAL H&P NOTE
H&P reviewed. The patient was examined and there are no changes to the H&P.    Vitals:    04/24/23 0919   BP: 125/93   Pulse: 75   Resp: 12   Temp: 97.3 °F (36.3 °C)   SpO2: 100%

## 2023-04-24 NOTE — ANESTHESIA PREPROCEDURE EVALUATION
Anesthesia Evaluation     Patient summary reviewed and Nursing notes reviewed   no history of anesthetic complications (never had anesthesia before; adopted- doesn't know family history):  NPO Solid Status: > 8 hours  NPO Liquid Status: > 8 hours           Airway   Mallampati: I  TM distance: >3 FB  Neck ROM: full  No difficulty expected  Dental - normal exam     Pulmonary - normal exam    breath sounds clear to auscultation  (+) a smoker (vape and cigarettes) Current Smoked day of surgery, asthma (childhood only),  Cardiovascular - normal exam  Exercise tolerance: excellent (>7 METS)    Rhythm: regular  Rate: normal        Neuro/Psych  (+) psychiatric history Anxiety and Depression,    GI/Hepatic/Renal/Endo    (+)   hepatitis C, liver disease (Hep C), renal disease stones,     Musculoskeletal (-) negative ROS    Abdominal  - normal exam   Substance History   (+) alcohol use (socially), drug use (marijuana 3-4 weekly)     OB/GYN          Other                        Anesthesia Plan    ASA 2     general     intravenous induction     Anesthetic plan, risks, benefits, and alternatives have been provided, discussed and informed consent has been obtained with: patient.    Use of blood products discussed with patient  Consented to blood products.   Plan discussed with CRNA.        CODE STATUS:    Level Of Support Discussed With: Patient  Code Status (Patient has no pulse and is not breathing): CPR (Attempt to Resuscitate)  Medical Interventions (Patient has pulse or is breathing): Full Support  Release to patient: Routine Release

## 2023-04-24 NOTE — OP NOTE
Subjective     Date of Service:  04/24/23  Time of Service:  11:30 EDT    Surgical Staff: Surgeon(s) and Role:     * Ronald Land DO - Primary   Additional Staff: Antonio Russell CFA Assistant: Antonio Russell CSA was responsible for performing the following activities: Retraction, Suction, Irrigation, Suturing, Closing and Placing Dressing and their skilled assistance was necessary for the success of this case.    Pre-operative diagnosis(es): Pre-Op Diagnosis Codes:     * Cyst of right ovary [N83.201]     * Encounter for sterilization [Z30.2]     Post-operative diagnosis(es): Post-Op Diagnosis Codes:     * Cyst of right ovary [N83.201]     * Encounter for sterilization [Z30.2]   Procedure(s): Procedure(s):  Bilateral salpingectomy laparoscopic, Right Cystectomy  REMOVAL OF CONTRACEPTIVE CAPSULE     Antibiotics: None indicated ordered on call to OR     Anesthesia: Type: Choice  ASA:  II     Objective      Operative findings: Normal appearing fallopian tubes, uterus. Right ovary with 3-4cm simple cyst; left ovary WNL.     Nexplanon easily palpated non dominant left arm.      Specimens removed: ID Type Source Tests Collected by Time   A (Not marked as sent) :  Tissue Fallopian Tube, Left TISSUE PATHOLOGY EXAM Ronald Land, DO 4/24/2023 1101   B (Not marked as sent) :  Tissue Fallopian Tube, Right TISSUE PATHOLOGY EXAM Ronald Land, DO 4/24/2023 1101   C (Not marked as sent) : cyst wall Tissue Ovary, Right TISSUE PATHOLOGY EXAM Ronald Land, DO 4/24/2023 1111      Fluid Intake: 500mL   Output: Documented Output  Est. Blood Loss <5mL  Urine Output ~20mL      I/O this shift:  In: 500 [I.V.:500]  Out: 55 [Urine:50; Blood:5]     Blood products used: No   Drains: * No LDAs found *   Implant Information: Nothing was implanted during the procedure   Complications: None apparent   Intraoperative consult(s): none   Condition: stable   Disposition: to PACU and then admit to home         Assessment & Plan      Procedure: Bilateral salpingectomy and Right cystectomy:     The patient was taken to the operating room with intravenous fluids running and sequential compression devices in place.  General anesthesia was induced without difficulty. She was then prepped and draped in the normal sterile fashion in the dorsal lithotomy position.  A leos catheter was placed in her bladder. A surgical time out was performed using two patient identifiers.    Attention was then turned to the abdomen.    The abdomen was grasped  and a 5mm vertical incision was made in the umbilicus. A 5 millimeter trocar was advanced into the abdomen under direct visualization. Once intraperitoneal placement was confirmed the abdomen was insufflated with carbon dioxide gas.  Initial survey revealed no injury from the placement of the trocar. Additional 5millimeter ports were placed in the left and right lower quadrants without difficulty under direct visualization with no apparent injury.   Survey of the pelvis revealed normal the findings as noted above.        The left fallopian tube was grasped at the fimbriated end and the Enseal electrosurgical device was used to coagulate and transect the mesosalpinx.  At the cornua the fallopian tube was transected.  The specimen was removed through the port without difficulty and the mesosalpinx was hemostastic upon inspection.  In a similar manner, the right fallopian tube was excised and removed from the abdomen.    A monopolar device was used to incise the ovarian serosa overlying the right ovarian cyst.  The serosa was opened and [place] graspers were used to dissect the cyst  wall from the ovarian serosa. The Enseal was used to facilitate dissection.  The cyst was inadvertently ruptured and clear yellow cyst fluid was evacuated with the suction . Minimal cyst fluid was spilled into the abdominal cavity. Cyst wall removed and sent to pathology. Hemostasis noted.     The left and right lower  quadrant ports were removed under direct visualization with good hemostasis noted. The abdomen was desufflated and the umblical trocar was removed without difficulty. The port sites were closed with a subcuticular stitch of 4-0 monocryl and covered with Dermabond surgical glue.     PROCEDURE: Nexplanon removal       Second time out performed. Site of placement confirmed. Left upper extremity placed in the desired position for removal. Patient was prepped and draped in the usual sterile fashion. 4-5mL of .25% Marcaine injected under the skin. Easily palpated under left arm; Nexplanon removed in standard technique. Dressing applied to site. Precautions given. Pt tolerated the procedure well.        The patient was awakened from general anesthesia without difficulty and taken to the recovery room in stable condition. All counts were correct and no complications were noted.           Ronald Land DO

## 2023-04-27 LAB
LAB AP CASE REPORT: NORMAL
LAB AP DIAGNOSIS COMMENT: NORMAL
PATH REPORT.FINAL DX SPEC: NORMAL
PATH REPORT.GROSS SPEC: NORMAL

## 2023-08-17 ENCOUNTER — OFFICE VISIT (OUTPATIENT)
Dept: OBSTETRICS AND GYNECOLOGY | Facility: CLINIC | Age: 33
End: 2023-08-17
Payer: MEDICAID

## 2023-08-17 VITALS
WEIGHT: 149.2 LBS | SYSTOLIC BLOOD PRESSURE: 130 MMHG | BODY MASS INDEX: 23.42 KG/M2 | DIASTOLIC BLOOD PRESSURE: 84 MMHG | HEIGHT: 67 IN

## 2023-08-17 DIAGNOSIS — N83.201 CYST OF RIGHT OVARY: Primary | ICD-10-CM

## 2023-08-17 DIAGNOSIS — Z48.89 POSTOPERATIVE VISIT: ICD-10-CM

## 2023-08-17 PROBLEM — Z30.017 INSERTION OF NEXPLANON: Status: RESOLVED | Noted: 2020-10-12 | Resolved: 2023-08-17

## 2023-08-17 PROBLEM — O09.93 HIGH-RISK PREGNANCY IN THIRD TRIMESTER: Status: RESOLVED | Noted: 2019-02-19 | Resolved: 2023-08-17

## 2023-08-17 PROBLEM — Z01.818 PREOP EXAMINATION: Status: RESOLVED | Noted: 2023-03-30 | Resolved: 2023-08-17

## 2023-08-17 PROBLEM — O24.419 WHITE CLASSIFICATION A2 GESTATIONAL DIABETES MELLITUS: Status: RESOLVED | Noted: 2019-04-26 | Resolved: 2023-08-17

## 2023-08-17 PROBLEM — Z34.90 TERM PREGNANCY: Status: RESOLVED | Noted: 2019-07-08 | Resolved: 2023-08-17

## 2023-08-17 PROBLEM — Z30.432 ENCOUNTER FOR IUD REMOVAL: Status: RESOLVED | Noted: 2020-10-12 | Resolved: 2023-08-17

## 2023-08-17 PROBLEM — N89.8 VAGINAL DISCHARGE: Status: RESOLVED | Noted: 2020-08-17 | Resolved: 2023-08-17

## 2023-08-17 PROBLEM — Z97.5 NEXPLANON IN PLACE: Status: RESOLVED | Noted: 2023-03-16 | Resolved: 2023-08-17

## 2023-08-17 RX ORDER — CITALOPRAM HYDROBROMIDE 10 MG/1
10 TABLET ORAL DAILY
COMMUNITY

## 2023-08-17 NOTE — PROGRESS NOTES
"S/P salpingectomy, Cystectomy, Nexplanon removal in the [facility] . Pelvic pain is better; does have HVB at times with cycle.  Has been sober > 6 years and her and her boyfriend are very happy. ROS otherwise neg     Initial Appt:   Delaney Duron is a 32 y.o.  whose LMP is Patient's last menstrual period was 2023.. She has the Nexplanon, it expires in 10/23. She has chronic pelvic pain, feels like that has been ongoing for several years.  She is having irregular bleeding as well. She is asking for the Nexplanon to be removed today. She was recently seen in the ER at [facility] d/t pelvic pain and was told she has a (R) adnexal cyst measuring 4.3cm. She is interested in having a partial hysterctomy. She is in a relationship and needs contraception. Her partner is considering a vasectomy.     She reports she has been clean x 5 years and is a healthy relationship currently. Reports she is very happy with her life right now. She does not desire any more children.     Disc with patient that d/t insurance restrictions, she can either address her ovarian cyst or have her nexplanon removed today. Pt desires to address her ovarian cyst.     HPI    HPI    The following portions of the patient's history were reviewed and updated as appropriate:vital signs, allergies, current medications, past medical history, past social history, past surgical history and problem list      Review of Systems   AUB at times       Objective      /84   Ht 170.2 cm (67\")   Wt 67.7 kg (149 lb 3.2 oz)   LMP 2023 Comment: 2 1/2 week long period  BMI 23.37 kg/mý     Physical Exam  Vitals: VSS; AF    General Appearance:  Awake. Alert. Well developed. Well nourished. In no acute distress.      Lungs:  ø Clear to auscultation bilaterally without wheezes, rales or rhonchi.  Cardiovascular:  ø System: RRR, no murmur, consistent with normal pregnancy.  Abdomen:  Visual Inspection: ø Abdomen was normal on visual " inspection.  Palpation: ø Abdomen was soft. ø Abdominal non-tender.    Neurological:  ø Oriented to time, place, and person.  Skin well healed     Assessment  Diagnoses and all orders for this visit:    1. Cyst of right ovary (Primary)    2. Postoperative visit      Post op   Discussed normal pathology   If continues to have AUB will f/u; not interested in hormonal medications tat this time for AUB   Consider US next year for cyst Hx     I spent 30 minutes caring for Delaney on this date of service. This time includes time spent by me in the following activities: preparing for the visit, reviewing tests, obtaining and/or reviewing a separately obtained history, performing a medically appropriate examination and/or evaluation, counseling and educating the patient/family/caregiver, ordering medications, tests, or procedures, documenting information in the medical record, independently interpreting results and communicating that information with the patient/family/caregiver and care coordination     Ronald Land DO  8/17/2023   15:01 EDT

## 2023-08-28 ENCOUNTER — TELEPHONE (OUTPATIENT)
Dept: OBSTETRICS AND GYNECOLOGY | Facility: CLINIC | Age: 33
End: 2023-08-28
Payer: MEDICAID

## 2023-08-28 ENCOUNTER — OFFICE VISIT (OUTPATIENT)
Dept: OBSTETRICS AND GYNECOLOGY | Facility: CLINIC | Age: 33
End: 2023-08-28
Payer: MEDICAID

## 2023-08-28 VITALS
BODY MASS INDEX: 23.29 KG/M2 | HEIGHT: 67 IN | WEIGHT: 148.4 LBS | SYSTOLIC BLOOD PRESSURE: 162 MMHG | DIASTOLIC BLOOD PRESSURE: 92 MMHG

## 2023-08-28 DIAGNOSIS — F19.11 H/O DRUG ABUSE: ICD-10-CM

## 2023-08-28 DIAGNOSIS — Z48.89 POSTOPERATIVE VISIT: Primary | ICD-10-CM

## 2023-08-28 DIAGNOSIS — R10.2 PELVIC PAIN: ICD-10-CM

## 2023-08-28 PROBLEM — F32.A DEPRESSION AFFECTING PREGNANCY: Status: RESOLVED | Noted: 2018-12-05 | Resolved: 2023-08-28

## 2023-08-28 PROBLEM — B96.89 BV (BACTERIAL VAGINOSIS): Status: RESOLVED | Noted: 2020-07-09 | Resolved: 2023-08-28

## 2023-08-28 PROBLEM — N83.209 CYST OF OVARY: Status: RESOLVED | Noted: 2020-07-09 | Resolved: 2023-08-28

## 2023-08-28 PROBLEM — Z30.2 ENCOUNTER FOR STERILIZATION: Status: RESOLVED | Noted: 2023-03-30 | Resolved: 2023-08-28

## 2023-08-28 PROBLEM — Z30.431 IUD CHECK UP: Status: RESOLVED | Noted: 2020-07-09 | Resolved: 2023-08-28

## 2023-08-28 PROBLEM — N76.0 BV (BACTERIAL VAGINOSIS): Status: RESOLVED | Noted: 2020-07-09 | Resolved: 2023-08-28

## 2023-08-28 PROBLEM — O99.340 DEPRESSION AFFECTING PREGNANCY: Status: RESOLVED | Noted: 2018-12-05 | Resolved: 2023-08-28

## 2023-08-28 LAB
BILIRUB BLD-MCNC: NEGATIVE MG/DL
CLARITY, POC: CLEAR
COLOR UR: ABNORMAL
GLUCOSE UR STRIP-MCNC: NEGATIVE MG/DL
KETONES UR QL: NEGATIVE
LEUKOCYTE EST, POC: NEGATIVE
NITRITE UR-MCNC: NEGATIVE MG/ML
PH UR: 5 [PH] (ref 5–8)
PROT UR STRIP-MCNC: NEGATIVE MG/DL
RBC # UR STRIP: ABNORMAL /UL
SP GR UR: 1 (ref 1–1.03)
UROBILINOGEN UR QL: NORMAL

## 2023-08-28 PROCEDURE — 99213 OFFICE O/P EST LOW 20 MIN: CPT | Performed by: STUDENT IN AN ORGANIZED HEALTH CARE EDUCATION/TRAINING PROGRAM

## 2023-08-28 RX ORDER — NAPROXEN 500 MG/1
500 TABLET ORAL 2 TIMES DAILY WITH MEALS
Qty: 30 TABLET | Refills: 2 | Status: SHIPPED | OUTPATIENT
Start: 2023-08-28

## 2023-08-28 NOTE — PROGRESS NOTES
"S/P salpingectomy, Cystectomy, Nexplanon removal in the [facility] .   The last 2-3 days severe pelvic pain. Unable to perform ADL's, spend time with her children or loved ones.   She has had pelvic pain most of her life with her cycle.     Initial Appt:   Delaney Duron is a 32 y.o.  whose LMP is Patient's last menstrual period was 2023.. She has the Nexplanon, it expires in 10/23. She has chronic pelvic pain, feels like that has been ongoing for several years.  She is having irregular bleeding as well. She is asking for the Nexplanon to be removed today. She was recently seen in the ER at [facility] d/t pelvic pain and was told she has a (R) adnexal cyst measuring 4.3cm. She is interested in having a partial hysterctomy. She is in a relationship and needs contraception. Her partner is considering a vasectomy.     She reports she has been clean x 5 years and is a healthy relationship currently. Reports she is very happy with her life right now. She does not desire any more children.     Disc with patient that d/t insurance restrictions, she can either address her ovarian cyst or have her nexplanon removed today. Pt desires to address her ovarian cyst.           Review of Systems   Pelvic pain       Objective      /92   Ht 170.2 cm (67\")   Wt 67.3 kg (148 lb 6.4 oz)   LMP 2023 Comment: 2 1/2 week long period  BMI 23.24 kg/mý     Physical Exam  Vitals: VSS; AF    General Appearance:  Awake. Alert. Well developed. Well nourished. In no acute distress.      Lungs:  ø Clear to auscultation bilaterally without wheezes, rales or rhonchi.  Cardiovascular:  ø System: RRR, no murmur, consistent with normal pregnancy.  Abdomen:  Visual Inspection: ø Abdomen was normal on visual inspection.  Palpation: ø Abdomen was soft. ø Abdominal non-tender.    Neurological:  ø Oriented to time, place, and person.  Skin well healed     Assessment  Diagnoses and all orders for this visit:    1. Postoperative " visit (Primary)  -     POC Urinalysis Dipstick    2. Pelvic pain    3. H/O drug abuse- clean x 6 years, UDS neg    Other orders  -     Relugolix-Estradiol-Norethind 40-1-0.5 MG tablet; Take 1 tablet by mouth Daily.  Dispense: 60 tablet; Refill: 6  -     naproxen (NAPROSYN) 500 MG tablet; Take 1 tablet by mouth 2 (Two) Times a Day With Meals.  Dispense: 30 tablet; Refill: 2      Severe pain : Concern for Endometriosis  Will attempt Myfembree; if does not work we could consider PFPT vs hysterectomy     We discussed normal pathology last visit   Rx for Pain medication ( NSAID's)   If continues to have AUB will f/u; not interested in hormonal medications at this time for AUB   F/u 1-2 months or prn     I spent 30 minutes caring for Delaney on this date of service. This time includes time spent by me in the following activities: preparing for the visit, reviewing tests, obtaining and/or reviewing a separately obtained history, performing a medically appropriate examination and/or evaluation, counseling and educating the patient/family/caregiver, ordering medications, tests, or procedures, documenting information in the medical record, independently interpreting results and communicating that information with the patient/family/caregiver and care coordination     Ronald Land DO  8/28/2023   13:17 EDT

## 2023-08-28 NOTE — TELEPHONE ENCOUNTER
Caller: Delaney Duron    Relationship to patient: Self    Best call back number: [phone]    Patient is needing: PATIENT CALLED TO SCHED APPT W/ DR SIMPSON ASAP - [facility] SCHEDULED FIRST AVAILABLE FOR 9/5/23 - PATIENT STATED SHE IS HAVING SEVERE SYMPTOMS INCLUDING SEVERE CRAMPING, RESTLESS LEGS, SPOTTING BUT NO HEAVY BLEEDING (WHICH IS MORE COMMON FOR HER), AND HAS BEEN IN BED FOR 3 DAYS - SHE WOULD LIKE TO KNOW IF SHE CAN BE SEEN ASAP OR IF THERE IS ANYTHING SHE CAN DO IN THE MEAN TIME

## 2023-09-26 DIAGNOSIS — N80.9 ENDOMETRIOSIS: Primary | ICD-10-CM

## 2023-09-26 RX ORDER — TRAMADOL HYDROCHLORIDE 50 MG/1
50 TABLET ORAL EVERY 6 HOURS PRN
Qty: 20 TABLET | Refills: 0 | Status: SHIPPED | OUTPATIENT
Start: 2023-09-26 | End: 2024-09-25

## 2024-01-04 ENCOUNTER — PREP FOR SURGERY (OUTPATIENT)
Dept: OTHER | Facility: HOSPITAL | Age: 34
End: 2024-01-04
Payer: MEDICAID

## 2024-01-04 ENCOUNTER — OFFICE VISIT (OUTPATIENT)
Dept: OBSTETRICS AND GYNECOLOGY | Facility: CLINIC | Age: 34
End: 2024-01-04
Payer: MEDICAID

## 2024-01-04 VITALS
HEIGHT: 67 IN | BODY MASS INDEX: 22.76 KG/M2 | WEIGHT: 145 LBS | DIASTOLIC BLOOD PRESSURE: 78 MMHG | SYSTOLIC BLOOD PRESSURE: 122 MMHG

## 2024-01-04 DIAGNOSIS — R10.2 PELVIC PAIN: ICD-10-CM

## 2024-01-04 DIAGNOSIS — R10.2 PELVIC PAIN: Primary | ICD-10-CM

## 2024-01-04 DIAGNOSIS — F19.11 H/O DRUG ABUSE: ICD-10-CM

## 2024-01-04 DIAGNOSIS — Z01.818 PRE-OP EVALUATION: Primary | ICD-10-CM

## 2024-01-04 DIAGNOSIS — N80.9 ENDOMETRIOSIS: ICD-10-CM

## 2024-01-04 RX ORDER — SODIUM CHLORIDE 0.9 % (FLUSH) 0.9 %
10 SYRINGE (ML) INJECTION EVERY 12 HOURS SCHEDULED
OUTPATIENT
Start: 2024-01-04

## 2024-01-04 RX ORDER — KETOROLAC TROMETHAMINE 10 MG/1
10 TABLET, FILM COATED ORAL EVERY 6 HOURS PRN
Qty: 20 TABLET | Refills: 1 | Status: SHIPPED | OUTPATIENT
Start: 2024-01-04 | End: 2024-01-05

## 2024-01-04 RX ORDER — SODIUM CHLORIDE 0.9 % (FLUSH) 0.9 %
10 SYRINGE (ML) INJECTION AS NEEDED
OUTPATIENT
Start: 2024-01-04

## 2024-01-04 RX ORDER — SODIUM CHLORIDE 9 MG/ML
40 INJECTION, SOLUTION INTRAVENOUS AS NEEDED
OUTPATIENT
Start: 2024-01-04

## 2024-01-04 NOTE — PROGRESS NOTES
"Still having severe pelvic pain. Affecting her ADL's and quality of life. Has used myfembree for 3 months with little relief. Desires definitive surgical Tx    Initial Appt:   Delaney Duron is a 33 y.o.  whose LMP is Patient's last menstrual period was 2024.. She has the Nexplanon, it expires in 10/23. She has chronic pelvic pain, feels like that has been ongoing for several years.  She is having irregular bleeding as well. She is asking for the Nexplanon to be removed today. She was recently seen in the ER at [facility] d/t pelvic pain and was told she has a (R) adnexal cyst measuring 4.3cm. She is interested in having a partial hysterctomy. She is in a relationship and needs contraception. Her partner is considering a vasectomy.     She reports she has been clean x 5 years and is a healthy relationship currently. Reports she is very happy with her life right now. She does not desire any more children.     Disc with patient that d/t insurance restrictions, she can either address her ovarian cyst or have her nexplanon removed today. Pt desires to address her ovarian cyst.           Review of Systems   Pelvic pain       Objective      /78   Ht 170.2 cm (67\")   Wt 65.8 kg (145 lb)   LMP 2024   BMI 22.71 kg/m²     Physical Exam  Vitals: VSS; AF    General Appearance:  Awake. Alert. Well developed. Well nourished. In no acute distress.      Lungs:  ° Clear to auscultation bilaterally without wheezes, rales or rhonchi.  Cardiovascular:  ° System: RRR, no murmur, consistent with normal pregnancy.  Abdomen:  Visual Inspection: ° Abdomen was normal on visual inspection.  Palpation: ° Abdomen was soft. ° Abdominal non-tender.    Neurological:  ° Oriented to time, place, and person.  Trocar sites x3; healed     Assessment  Diagnoses and all orders for this visit:    1. H/O drug abuse- clean x 6 years, UDS neg (Primary)    2. Pelvic pain    3. Endometriosis      Severe pain : Concern for " Endometriosis  Utilized Myfembree; if does not work we could consider PFPT vs hysterectomy; she desires hysterectomy   NSAID for pain relief      Pt has been counseled with regards to indications, alternatives, risks, and benefits of a laparoscopic hysterectomy with bilateral salpingectomy and cystoscopy.  She understands surgery will include placement of instruments through the abdominal wall to view the contents of the abdomen and pelvis, and to remove her uterus, cervix, and bilateral fallopian tubes.  We will also place a camera through her urethra and into her bladder to evaluate for injury to the bladder or the ureters.  If the procedure is unable to be accomplished laparoscopically or if hemorrhage occurs,  will plan to convert to an open abdominal procedure potentially a vertical midline incision that may go above her umbilicus.     We discussed that risks also include bleeding, possible need for a transfusion (with associated risks of HIV, hepatitis, and other infectious diseases), infection requiring prolonged hospitalization and treatment with antibiotics, damage to other structures (bowel, bladder, ureters, blood vessels) requiring further surgery, necessitating an abdominal incision to remove or repair affected organs with subsequent poor wound healing, and persistent pain.      The ovaries will be inspected at the time of surgery and removed if abnormal. The patient is aware of a 10-15% lifetime of need for further surgery to remove the ovaries. She understands that delayed removal of the ovaries may be more difficult and have more operative risk due to the potential for scarring and adhesions.    She was also counseled that anesthesia carries its own risks and that any major surgery carries the rare risk of blood clots, pulmonary embolism, stroke, heart attack, or death. All of the patient's questions were answered to her satisfaction and she desires to proceed with surgery.      I spent 30 minutes  caring for Delaney on this date of service. This time includes time spent by me in the following activities: preparing for the visit, reviewing tests, obtaining and/or reviewing a separately obtained history, performing a medically appropriate examination and/or evaluation, counseling and educating the patient/family/caregiver, ordering medications, tests, or procedures, documenting information in the medical record, independently interpreting results and communicating that information with the patient/family/caregiver and care coordination     Consents today   Plan to keep ovaries for mortality and morbidity reasons     Ronald Land DO  1/4/2024   13:53 EST

## 2024-01-04 NOTE — H&P (VIEW-ONLY)
"Still having severe pelvic pain. Affecting her ADL's and quality of life. Has used myfembree for 3 months with little relief. Desires definitive surgical Tx    Initial Appt:   Delaney Duron is a 33 y.o.  whose LMP is Patient's last menstrual period was 2024.. She has the Nexplanon, it expires in 10/23. She has chronic pelvic pain, feels like that has been ongoing for several years.  She is having irregular bleeding as well. She is asking for the Nexplanon to be removed today. [facility] and was told she has a (R) adnexal cyst measuring 4.3cm. She is interested in having a partial hysterctomy. She is in a relationship and needs contraception. Her partner is considering a vasectomy.     She reports she has been clean x 5 years and is a healthy relationship currently. Reports she is very happy with her life right now. She does not desire any more children.     Disc with patient that d/t insurance restrictions, she can either address her ovarian cyst or have her nexplanon removed today. Pt desires to address her ovarian cyst.           Review of Systems   Pelvic pain       Objective      /78   Ht 170.2 cm (67\")   Wt 65.8 kg (145 lb)   LMP 2024   BMI 22.71 kg/m²     Physical Exam  Vitals: VSS; AF    General Appearance:  Awake. Alert. Well developed. Well nourished. In no acute distress.      Lungs:  ° Clear to auscultation bilaterally without wheezes, rales or rhonchi.  Cardiovascular:  ° System: RRR, no murmur, consistent with normal pregnancy.  Abdomen:  Visual Inspection: ° Abdomen was normal on visual inspection.  Palpation: ° Abdomen was soft. ° Abdominal non-tender.    Neurological:  ° Oriented to time, place, and person.  Trocar sites x3; healed     Assessment  Diagnoses and all orders for this visit:    1. H/O drug abuse- clean x 6 years, UDS neg (Primary)    2. Pelvic pain    3. Endometriosis      Severe pain : Concern for " Endometriosis  Utilized Myfembree; if does not work we could consider PFPT vs hysterectomy; she desires hysterectomy   NSAID for pain relief      Pt has been counseled with regards to indications, alternatives, risks, and benefits of a laparoscopic hysterectomy with bilateral salpingectomy and cystoscopy.  She understands surgery will include placement of instruments through the abdominal wall to view the contents of the abdomen and pelvis, and to remove her uterus, cervix, and bilateral fallopian tubes.  We will also place a camera through her urethra and into her bladder to evaluate for injury to the bladder or the ureters.  If the procedure is unable to be accomplished laparoscopically or if hemorrhage occurs,  will plan to convert to an open abdominal procedure potentially a vertical midline incision that may go above her umbilicus.     We discussed that risks also include bleeding, possible need for a transfusion (with associated risks of HIV, hepatitis, and other infectious diseases), infection requiring prolonged hospitalization and treatment with antibiotics, damage to other structures (bowel, bladder, ureters, blood vessels) requiring further surgery, necessitating an abdominal incision to remove or repair affected organs with subsequent poor wound healing, and persistent pain.      The ovaries will be inspected at the time of surgery and removed if abnormal. The patient is aware of a 10-15% lifetime of need for further surgery to remove the ovaries. She understands that delayed removal of the ovaries may be more difficult and have more operative risk due to the potential for scarring and adhesions.    She was also counseled that anesthesia carries its own risks and that any major surgery carries the rare risk of blood clots, pulmonary embolism, stroke, heart attack, or death. All of the patient's questions were answered to her satisfaction and she desires to proceed with surgery.      I spent 30 minutes  caring for Delaney on this date of service. This time includes time spent by me in the following activities: preparing for the visit, reviewing tests, obtaining and/or reviewing a separately obtained history, performing a medically appropriate examination and/or evaluation, counseling and educating the patient/family/caregiver, ordering medications, tests, or procedures, documenting information in the medical record, independently interpreting results and communicating that information with the patient/family/caregiver and care coordination     Consents today   Plan to keep ovaries for mortality and morbidity reasons     Ronald Land DO  1/4/2024   13:53 EST

## 2024-01-05 ENCOUNTER — PRE-ADMISSION TESTING (OUTPATIENT)
Dept: PREADMISSION TESTING | Facility: HOSPITAL | Age: 34
End: 2024-01-05
Payer: MEDICAID

## 2024-01-05 VITALS
DIASTOLIC BLOOD PRESSURE: 80 MMHG | HEART RATE: 88 BPM | SYSTOLIC BLOOD PRESSURE: 134 MMHG | HEIGHT: 67 IN | OXYGEN SATURATION: 94 % | BODY MASS INDEX: 22.44 KG/M2 | RESPIRATION RATE: 16 BRPM | WEIGHT: 143 LBS

## 2024-01-05 DIAGNOSIS — R10.2 PELVIC PAIN: ICD-10-CM

## 2024-01-05 DIAGNOSIS — N83.201 CYST OF RIGHT OVARY: ICD-10-CM

## 2024-01-05 DIAGNOSIS — N80.9 ENDOMETRIOSIS: ICD-10-CM

## 2024-01-05 DIAGNOSIS — Z30.2 ENCOUNTER FOR STERILIZATION: ICD-10-CM

## 2024-01-05 DIAGNOSIS — Z01.818 PRE-OP EVALUATION: ICD-10-CM

## 2024-01-05 LAB
ABO GROUP BLD: NORMAL
BASOPHILS # BLD AUTO: 0.04 10*3/MM3 (ref 0–0.2)
BASOPHILS NFR BLD AUTO: 0.6 % (ref 0–1.5)
BLD GP AB SCN SERPL QL: NEGATIVE
DEPRECATED RDW RBC AUTO: 56.1 FL (ref 37–54)
EOSINOPHIL # BLD AUTO: 0.1 10*3/MM3 (ref 0–0.4)
EOSINOPHIL NFR BLD AUTO: 1.5 % (ref 0.3–6.2)
ERYTHROCYTE [DISTWIDTH] IN BLOOD BY AUTOMATED COUNT: 16.2 % (ref 12.3–15.4)
HCG SERPL QL: NEGATIVE
HCT VFR BLD AUTO: 44.2 % (ref 34–46.6)
HGB BLD-MCNC: 14.5 G/DL (ref 12–15.9)
IMM GRANULOCYTES # BLD AUTO: 0.02 10*3/MM3 (ref 0–0.05)
IMM GRANULOCYTES NFR BLD AUTO: 0.3 % (ref 0–0.5)
LYMPHOCYTES # BLD AUTO: 1.87 10*3/MM3 (ref 0.7–3.1)
LYMPHOCYTES NFR BLD AUTO: 28.5 % (ref 19.6–45.3)
MCH RBC QN AUTO: 30.5 PG (ref 26.6–33)
MCHC RBC AUTO-ENTMCNC: 32.8 G/DL (ref 31.5–35.7)
MCV RBC AUTO: 92.9 FL (ref 79–97)
MONOCYTES # BLD AUTO: 0.53 10*3/MM3 (ref 0.1–0.9)
MONOCYTES NFR BLD AUTO: 8.1 % (ref 5–12)
NEUTROPHILS NFR BLD AUTO: 4.01 10*3/MM3 (ref 1.7–7)
NEUTROPHILS NFR BLD AUTO: 61 % (ref 42.7–76)
NRBC BLD AUTO-RTO: 0 /100 WBC (ref 0–0.2)
PLATELET # BLD AUTO: 271 10*3/MM3 (ref 140–450)
PMV BLD AUTO: 10.3 FL (ref 6–12)
RBC # BLD AUTO: 4.76 10*6/MM3 (ref 3.77–5.28)
RH BLD: POSITIVE
T&S EXPIRATION DATE: NORMAL
WBC NRBC COR # BLD AUTO: 6.57 10*3/MM3 (ref 3.4–10.8)

## 2024-01-05 PROCEDURE — 86900 BLOOD TYPING SEROLOGIC ABO: CPT | Performed by: STUDENT IN AN ORGANIZED HEALTH CARE EDUCATION/TRAINING PROGRAM

## 2024-01-05 PROCEDURE — 86901 BLOOD TYPING SEROLOGIC RH(D): CPT | Performed by: STUDENT IN AN ORGANIZED HEALTH CARE EDUCATION/TRAINING PROGRAM

## 2024-01-05 PROCEDURE — 36415 COLL VENOUS BLD VENIPUNCTURE: CPT

## 2024-01-05 PROCEDURE — 86850 RBC ANTIBODY SCREEN: CPT | Performed by: STUDENT IN AN ORGANIZED HEALTH CARE EDUCATION/TRAINING PROGRAM

## 2024-01-05 PROCEDURE — 85025 COMPLETE CBC W/AUTO DIFF WBC: CPT | Performed by: STUDENT IN AN ORGANIZED HEALTH CARE EDUCATION/TRAINING PROGRAM

## 2024-01-05 PROCEDURE — 84703 CHORIONIC GONADOTROPIN ASSAY: CPT | Performed by: STUDENT IN AN ORGANIZED HEALTH CARE EDUCATION/TRAINING PROGRAM

## 2024-01-05 RX ORDER — INDOMETHACIN 25 MG/1
25 CAPSULE ORAL 3 TIMES DAILY PRN
COMMUNITY

## 2024-01-05 RX ORDER — L.ACID,CASEI/B.ANIMAL/S.THERMO 16B CELL
1 CAPSULE ORAL DAILY
COMMUNITY

## 2024-01-05 NOTE — DISCHARGE INSTRUCTIONS
PRE-ADMISSION TESTING INSTRUCTIONS FOR ADULTS    Take these medications the morning of surgery with a small sip of water: nothing       Do not take any insulin or diabetes medications the morning of surgery.      No aspirin, advil, aleve, ibuprofen, naproxen, diet pills, decongestants, or herbal/vitamins for a week prior to surgery.       Tylenol/Acetaminophen is okay to take if needed.    General Instructions:    DO NOT EAT SOLID FOOD AFTER MIDNIGHT THE NIGHT BEFORE SURGERY. No gum, mints, or hard candy after midnight the night before surgery.  You may drink clear liquids the day of surgery up until 2 hours before your arrival time.  Clear liquids are liquids you can see through. Nothing RED in color.    Plain water    Sports drinks      Gelatin (Jell-O)  Fruit juices without pulp such as white grape juice and apple juice  Popsicles that contain no fruit or yogurt  Tea or coffee (no cream or milk added)    It is beneficial for you to have a clear drink that contains carbohydrates 2 hours before your arrival time.  We suggest a 20 ounce bottle of Gatorade or Powerade for non-diabetic patients or a 20 ounce bottle of Gatorade Zero or Powerade Zero for diabetic patients.     Patients who avoid smoking, chewing tobacco and alcohol for 4 weeks prior to surgery have a reduced risk of post-operative complications.  If at all possible, quit smoking as many days before surgery as you can.    Do not smoke, use chewing tobacco or drink alcohol the day of surgery    Bring your C-PAP/ BI-PAP machine if you use one.  Wear clean comfortable clothes.  Do not wear contact lenses, lotion, deodorant, or make-up.  Bring a case for your glasses if applicable. You may brush your teeth the morning of surgery.  You may wear dentures/partials, do not put adhesive/glue on them.  Leave all other jewelry and valuables at home.      Preventing a Surgical Site Infection:    Shower the night before and on the morning of surgery using the  chlorhexidine soap you were given.  Use a clean washcloth with the soap.  Place clean sheets on your bed after showering the night before surgery. Do not use the CHG soap on your hair, face, or private areas. Wash your body gently for five (5) minutes. Do not scrub your skin.  Dry with a clean towel and dress in clean clothing.  Do not shave the surgical area for 10 days-2 weeks prior to surgery  because the razor can irritate skin and make it easier to develop an infection.  Make sure you, your family, and all healthcare providers clean their hands with soap and water or an alcohol based hand  before caring for you or your wound.      Day of surgery:    Your surgeon’s office will advise you of your arrival time for the day of surgery.    Upon arrival, a Pre-op nurse and Anesthesia provider will review your health history, obtain vital signs, and answer questions you may have. The anesthesia provider will also discuss the type of anesthesia that will be needed for your procedure, which may include general anesthesia. The only belongings needed at this time will be your home medications and if applicable your C-PAP/BI-PAP machine.  If you are staying overnight your family can leave the rest of your belongings in the car and bring them to your room later.  A Pre-op nurse will start an IV and you may receive medication in preparation for surgery, including something to help you relax.  Your family will be able to see you in the Pre-op area.  While you are in surgery your family should notify the waiting room  if they leave the waiting room area and provide a contact phone number.    IF you have any questions, you can call the Pre-Admission Department at [phone] or your surgeon's office.  Notify your surgeon if  you become sick, have a fever, productive cough, or cannot be here the day of surgery    Please be aware that surgery does come with discomfort.  We want to make every effort to  control your discomfort so please discuss any uncontrolled symptoms with your nurse.   Your doctor will most likely have prescribed pain medications.      If you are going home after surgery, you will receive individualized written care instructions before being discharged.  A responsible adult (over the age of 18) must drive you to and from the hospital on the day of your surgery and stay with you for 24 hours after anesthesia.    If you are staying overnight following surgery, you will be transported to your hospital room following the recovery period.  [facility] has all private rooms.    You may receive a survey regarding the care you received. Your feedback is very important and will be used to collect the necessary data to help us to continue to provide excellent care.     Deductibles and co-payments are collected on the day of service. Please be prepared to pay the required co-pay, deductible or deposit on the day of service as defined by your plan.

## 2024-01-05 NOTE — PAT
Pt here for PAT visit.  Pre-op tests completed, chg soap given, and instructions reviewed.  Instructed clears until 2 hrs prior to arrival time, voiced understanding. ERAS given and reviewed   
All other review of systems negative, except as noted in HPI

## 2024-01-09 ENCOUNTER — ANESTHESIA EVENT (OUTPATIENT)
Dept: PERIOP | Facility: HOSPITAL | Age: 34
End: 2024-01-09
Payer: MEDICAID

## 2024-01-10 ENCOUNTER — ANESTHESIA (OUTPATIENT)
Dept: PERIOP | Facility: HOSPITAL | Age: 34
End: 2024-01-10
Payer: MEDICAID

## 2024-01-10 ENCOUNTER — HOSPITAL ENCOUNTER (OUTPATIENT)
Facility: HOSPITAL | Age: 34
Discharge: HOME | End: 2024-01-11
Attending: STUDENT IN AN ORGANIZED HEALTH CARE EDUCATION/TRAINING PROGRAM | Admitting: STUDENT IN AN ORGANIZED HEALTH CARE EDUCATION/TRAINING PROGRAM
Payer: MEDICAID

## 2024-01-10 DIAGNOSIS — N80.9 ENDOMETRIOSIS: ICD-10-CM

## 2024-01-10 DIAGNOSIS — R10.2 PELVIC PAIN: ICD-10-CM

## 2024-01-10 DIAGNOSIS — Z90.710 S/P LAPAROSCOPIC HYSTERECTOMY: Primary | ICD-10-CM

## 2024-01-10 PROCEDURE — 58570 TLH UTERUS 250 G OR LESS: CPT | Performed by: STUDENT IN AN ORGANIZED HEALTH CARE EDUCATION/TRAINING PROGRAM

## 2024-01-10 PROCEDURE — 25010000002 SUGAMMADEX 200 MG/2ML SOLUTION: Performed by: NURSE ANESTHETIST, CERTIFIED REGISTERED

## 2024-01-10 PROCEDURE — 25810000003 LACTATED RINGERS PER 1000 ML: Performed by: NURSE ANESTHETIST, CERTIFIED REGISTERED

## 2024-01-10 PROCEDURE — 25010000002 ONDANSETRON PER 1 MG: Performed by: NURSE ANESTHETIST, CERTIFIED REGISTERED

## 2024-01-10 PROCEDURE — 63710000001 DIPHENHYDRAMINE PER 50 MG: Performed by: OBSTETRICS & GYNECOLOGY

## 2024-01-10 PROCEDURE — 25010000002 KETOROLAC TROMETHAMINE PER 15 MG: Performed by: STUDENT IN AN ORGANIZED HEALTH CARE EDUCATION/TRAINING PROGRAM

## 2024-01-10 PROCEDURE — 25010000002 DIPHENHYDRAMINE PER 50 MG: Performed by: STUDENT IN AN ORGANIZED HEALTH CARE EDUCATION/TRAINING PROGRAM

## 2024-01-10 PROCEDURE — 25010000002 HYDROMORPHONE 1 MG/ML SOLUTION: Performed by: NURSE ANESTHETIST, CERTIFIED REGISTERED

## 2024-01-10 PROCEDURE — 25010000002 BUPIVACAINE 0.5 % SOLUTION: Performed by: NURSE ANESTHETIST, CERTIFIED REGISTERED

## 2024-01-10 PROCEDURE — 25010000002 FENTANYL CITRATE (PF) 50 MCG/ML SOLUTION: Performed by: NURSE ANESTHETIST, CERTIFIED REGISTERED

## 2024-01-10 PROCEDURE — 25010000002 MAGNESIUM SULFATE 2 GM/50ML SOLUTION: Performed by: NURSE ANESTHETIST, CERTIFIED REGISTERED

## 2024-01-10 PROCEDURE — 25010000002 PROPOFOL 200 MG/20ML EMULSION: Performed by: NURSE ANESTHETIST, CERTIFIED REGISTERED

## 2024-01-10 PROCEDURE — P9041 ALBUMIN (HUMAN),5%, 50ML: HCPCS | Performed by: NURSE ANESTHETIST, CERTIFIED REGISTERED

## 2024-01-10 PROCEDURE — 25010000002 MIDAZOLAM PER 1MG: Performed by: NURSE ANESTHETIST, CERTIFIED REGISTERED

## 2024-01-10 PROCEDURE — G0378 HOSPITAL OBSERVATION PER HR: HCPCS

## 2024-01-10 PROCEDURE — 25010000002 ALBUMIN HUMAN 5% PER 50 ML: Performed by: NURSE ANESTHETIST, CERTIFIED REGISTERED

## 2024-01-10 PROCEDURE — 25010000002 MORPHINE PER 10 MG

## 2024-01-10 PROCEDURE — 25810000003 SODIUM CHLORIDE PER 500 ML: Performed by: STUDENT IN AN ORGANIZED HEALTH CARE EDUCATION/TRAINING PROGRAM

## 2024-01-10 PROCEDURE — 88307 TISSUE EXAM BY PATHOLOGIST: CPT | Performed by: STUDENT IN AN ORGANIZED HEALTH CARE EDUCATION/TRAINING PROGRAM

## 2024-01-10 PROCEDURE — 94761 N-INVAS EAR/PLS OXIMETRY MLT: CPT

## 2024-01-10 PROCEDURE — 25010000002 DEXAMETHASONE PER 1 MG: Performed by: NURSE ANESTHETIST, CERTIFIED REGISTERED

## 2024-01-10 PROCEDURE — 25010000002 CEFAZOLIN SODIUM-DEXTROSE 2-3 GM-%(50ML) RECONSTITUTED SOLUTION: Performed by: NURSE ANESTHETIST, CERTIFIED REGISTERED

## 2024-01-10 DEVICE — ABSORBABLE RELOAD
Type: IMPLANTABLE DEVICE | Site: ABDOMEN | Status: FUNCTIONAL
Brand: V-LOC 180

## 2024-01-10 RX ORDER — EPHEDRINE SULFATE 50 MG/ML
INJECTION INTRAVENOUS AS NEEDED
Status: DISCONTINUED | OUTPATIENT
Start: 2024-01-10 | End: 2024-01-10 | Stop reason: SURG

## 2024-01-10 RX ORDER — MAGNESIUM SULFATE HEPTAHYDRATE 40 MG/ML
INJECTION, SOLUTION INTRAVENOUS CONTINUOUS PRN
Status: DISCONTINUED | OUTPATIENT
Start: 2024-01-10 | End: 2024-01-10 | Stop reason: SURG

## 2024-01-10 RX ORDER — SODIUM CHLORIDE 9 MG/ML
INJECTION, SOLUTION INTRAVENOUS AS NEEDED
Status: DISCONTINUED | OUTPATIENT
Start: 2024-01-10 | End: 2024-01-10 | Stop reason: HOSPADM

## 2024-01-10 RX ORDER — SODIUM CHLORIDE, SODIUM LACTATE, POTASSIUM CHLORIDE, CALCIUM CHLORIDE 600; 310; 30; 20 MG/100ML; MG/100ML; MG/100ML; MG/100ML
9 INJECTION, SOLUTION INTRAVENOUS CONTINUOUS PRN
Status: DISCONTINUED | OUTPATIENT
Start: 2024-01-10 | End: 2024-01-10 | Stop reason: HOSPADM

## 2024-01-10 RX ORDER — SODIUM CHLORIDE 0.9 % (FLUSH) 0.9 %
10 SYRINGE (ML) INJECTION AS NEEDED
Status: DISCONTINUED | OUTPATIENT
Start: 2024-01-10 | End: 2024-01-10 | Stop reason: HOSPADM

## 2024-01-10 RX ORDER — MIDAZOLAM HYDROCHLORIDE 2 MG/2ML
1 INJECTION, SOLUTION INTRAMUSCULAR; INTRAVENOUS
Status: COMPLETED | OUTPATIENT
Start: 2024-01-10 | End: 2024-01-10

## 2024-01-10 RX ORDER — SODIUM CHLORIDE 0.9 % (FLUSH) 0.9 %
10 SYRINGE (ML) INJECTION EVERY 12 HOURS SCHEDULED
Status: DISCONTINUED | OUTPATIENT
Start: 2024-01-10 | End: 2024-01-10 | Stop reason: HOSPADM

## 2024-01-10 RX ORDER — ONDANSETRON 2 MG/ML
4 INJECTION INTRAMUSCULAR; INTRAVENOUS ONCE AS NEEDED
Status: COMPLETED | OUTPATIENT
Start: 2024-01-10 | End: 2024-01-10

## 2024-01-10 RX ORDER — ONDANSETRON 2 MG/ML
4 INJECTION INTRAMUSCULAR; INTRAVENOUS EVERY 6 HOURS PRN
Status: DISCONTINUED | OUTPATIENT
Start: 2024-01-10 | End: 2024-01-11 | Stop reason: HOSPADM

## 2024-01-10 RX ORDER — DOCUSATE SODIUM 100 MG/1
100 CAPSULE, LIQUID FILLED ORAL 2 TIMES DAILY
Status: DISCONTINUED | OUTPATIENT
Start: 2024-01-10 | End: 2024-01-11 | Stop reason: HOSPADM

## 2024-01-10 RX ORDER — ONDANSETRON 2 MG/ML
4 INJECTION INTRAMUSCULAR; INTRAVENOUS ONCE AS NEEDED
Status: DISCONTINUED | OUTPATIENT
Start: 2024-01-10 | End: 2024-01-10 | Stop reason: HOSPADM

## 2024-01-10 RX ORDER — BUPIVACAINE HYDROCHLORIDE 5 MG/ML
INJECTION, SOLUTION PERINEURAL
Status: COMPLETED | OUTPATIENT
Start: 2024-01-10 | End: 2024-01-10

## 2024-01-10 RX ORDER — ROCURONIUM BROMIDE 10 MG/ML
INJECTION, SOLUTION INTRAVENOUS AS NEEDED
Status: DISCONTINUED | OUTPATIENT
Start: 2024-01-10 | End: 2024-01-10 | Stop reason: SURG

## 2024-01-10 RX ORDER — GABAPENTIN 100 MG/1
100 CAPSULE ORAL 3 TIMES DAILY
Status: DISCONTINUED | OUTPATIENT
Start: 2024-01-10 | End: 2024-01-11 | Stop reason: HOSPADM

## 2024-01-10 RX ORDER — MELOXICAM 7.5 MG/1
7.5 TABLET ORAL DAILY
Status: DISCONTINUED | OUTPATIENT
Start: 2024-01-11 | End: 2024-01-11 | Stop reason: HOSPADM

## 2024-01-10 RX ORDER — DIPHENHYDRAMINE HYDROCHLORIDE 50 MG/ML
25 INJECTION INTRAMUSCULAR; INTRAVENOUS NIGHTLY PRN
Status: DISCONTINUED | OUTPATIENT
Start: 2024-01-10 | End: 2024-01-11 | Stop reason: HOSPADM

## 2024-01-10 RX ORDER — SIMETHICONE 80 MG
80 TABLET,CHEWABLE ORAL
Status: DISCONTINUED | OUTPATIENT
Start: 2024-01-10 | End: 2024-01-11 | Stop reason: HOSPADM

## 2024-01-10 RX ORDER — SODIUM CHLORIDE 9 MG/ML
40 INJECTION, SOLUTION INTRAVENOUS AS NEEDED
Status: DISCONTINUED | OUTPATIENT
Start: 2024-01-10 | End: 2024-01-10 | Stop reason: HOSPADM

## 2024-01-10 RX ORDER — OXYCODONE HYDROCHLORIDE AND ACETAMINOPHEN 5; 325 MG/1; MG/1
1 TABLET ORAL ONCE AS NEEDED
Status: COMPLETED | OUTPATIENT
Start: 2024-01-10 | End: 2024-01-10

## 2024-01-10 RX ORDER — ALBUMIN, HUMAN INJ 5% 5 %
SOLUTION INTRAVENOUS CONTINUOUS PRN
Status: DISCONTINUED | OUTPATIENT
Start: 2024-01-10 | End: 2024-01-10 | Stop reason: SURG

## 2024-01-10 RX ORDER — LIDOCAINE HYDROCHLORIDE 20 MG/ML
INJECTION, SOLUTION INFILTRATION; PERINEURAL AS NEEDED
Status: DISCONTINUED | OUTPATIENT
Start: 2024-01-10 | End: 2024-01-10 | Stop reason: SURG

## 2024-01-10 RX ORDER — CEFAZOLIN SODIUM 2 G/50ML
SOLUTION INTRAVENOUS AS NEEDED
Status: DISCONTINUED | OUTPATIENT
Start: 2024-01-10 | End: 2024-01-10 | Stop reason: SURG

## 2024-01-10 RX ORDER — BUPIVACAINE HYDROCHLORIDE AND EPINEPHRINE 2.5; 5 MG/ML; UG/ML
INJECTION, SOLUTION INFILTRATION; PERINEURAL AS NEEDED
Status: DISCONTINUED | OUTPATIENT
Start: 2024-01-10 | End: 2024-01-10 | Stop reason: HOSPADM

## 2024-01-10 RX ORDER — KETOROLAC TROMETHAMINE 30 MG/ML
15 INJECTION, SOLUTION INTRAMUSCULAR; INTRAVENOUS EVERY 6 HOURS
Status: COMPLETED | OUTPATIENT
Start: 2024-01-10 | End: 2024-01-10

## 2024-01-10 RX ORDER — DEXAMETHASONE SODIUM PHOSPHATE 4 MG/ML
4 INJECTION, SOLUTION INTRA-ARTICULAR; INTRALESIONAL; INTRAMUSCULAR; INTRAVENOUS; SOFT TISSUE ONCE AS NEEDED
Status: COMPLETED | OUTPATIENT
Start: 2024-01-10 | End: 2024-01-10

## 2024-01-10 RX ORDER — OXYCODONE HYDROCHLORIDE AND ACETAMINOPHEN 5; 325 MG/1; MG/1
1 TABLET ORAL EVERY 4 HOURS PRN
Status: DISCONTINUED | OUTPATIENT
Start: 2024-01-10 | End: 2024-01-11 | Stop reason: HOSPADM

## 2024-01-10 RX ORDER — FAMOTIDINE 10 MG/ML
20 INJECTION, SOLUTION INTRAVENOUS
Status: COMPLETED | OUTPATIENT
Start: 2024-01-10 | End: 2024-01-10

## 2024-01-10 RX ORDER — DIPHENHYDRAMINE HCL 25 MG
25 CAPSULE ORAL EVERY 6 HOURS PRN
Status: DISCONTINUED | OUTPATIENT
Start: 2024-01-10 | End: 2024-01-11 | Stop reason: HOSPADM

## 2024-01-10 RX ORDER — ONDANSETRON 4 MG/1
4 TABLET, ORALLY DISINTEGRATING ORAL EVERY 6 HOURS PRN
Status: DISCONTINUED | OUTPATIENT
Start: 2024-01-10 | End: 2024-01-11 | Stop reason: HOSPADM

## 2024-01-10 RX ORDER — SODIUM CHLORIDE, SODIUM LACTATE, POTASSIUM CHLORIDE, CALCIUM CHLORIDE 600; 310; 30; 20 MG/100ML; MG/100ML; MG/100ML; MG/100ML
100 INJECTION, SOLUTION INTRAVENOUS CONTINUOUS
Status: DISCONTINUED | OUTPATIENT
Start: 2024-01-10 | End: 2024-01-11 | Stop reason: HOSPADM

## 2024-01-10 RX ORDER — MORPHINE SULFATE 0.5 MG/ML
INJECTION, SOLUTION EPIDURAL; INTRATHECAL; INTRAVENOUS AS NEEDED
Status: DISCONTINUED | OUTPATIENT
Start: 2024-01-10 | End: 2024-01-10 | Stop reason: SURG

## 2024-01-10 RX ORDER — DIPHENHYDRAMINE HCL 25 MG
25 CAPSULE ORAL NIGHTLY PRN
Status: DISCONTINUED | OUTPATIENT
Start: 2024-01-10 | End: 2024-01-11 | Stop reason: HOSPADM

## 2024-01-10 RX ORDER — FENTANYL CITRATE 50 UG/ML
INJECTION, SOLUTION INTRAMUSCULAR; INTRAVENOUS AS NEEDED
Status: DISCONTINUED | OUTPATIENT
Start: 2024-01-10 | End: 2024-01-10 | Stop reason: SURG

## 2024-01-10 RX ORDER — PROPOFOL 10 MG/ML
INJECTION, EMULSION INTRAVENOUS AS NEEDED
Status: DISCONTINUED | OUTPATIENT
Start: 2024-01-10 | End: 2024-01-10 | Stop reason: SURG

## 2024-01-10 RX ORDER — DEXMEDETOMIDINE HYDROCHLORIDE 100 UG/ML
INJECTION, SOLUTION INTRAVENOUS AS NEEDED
Status: DISCONTINUED | OUTPATIENT
Start: 2024-01-10 | End: 2024-01-10 | Stop reason: SURG

## 2024-01-10 RX ADMIN — OXYCODONE AND ACETAMINOPHEN 1 TABLET: 5; 325 TABLET ORAL at 12:57

## 2024-01-10 RX ADMIN — GABAPENTIN 100 MG: 100 CAPSULE ORAL at 16:04

## 2024-01-10 RX ADMIN — DIPHENHYDRAMINE HYDROCHLORIDE 25 MG: 50 INJECTION, SOLUTION INTRAMUSCULAR; INTRAVENOUS at 17:16

## 2024-01-10 RX ADMIN — DEXMEDETOMIDINE 6 MCG: 100 INJECTION, SOLUTION INTRAVENOUS at 10:58

## 2024-01-10 RX ADMIN — HYDROMORPHONE HYDROCHLORIDE 0.5 MG: 1 INJECTION, SOLUTION INTRAMUSCULAR; INTRAVENOUS; SUBCUTANEOUS at 12:48

## 2024-01-10 RX ADMIN — KETOROLAC TROMETHAMINE 30 MG: 30 INJECTION, SOLUTION INTRAMUSCULAR; INTRAVENOUS at 12:05

## 2024-01-10 RX ADMIN — DEXMEDETOMIDINE 6 MCG: 100 INJECTION, SOLUTION INTRAVENOUS at 11:34

## 2024-01-10 RX ADMIN — DEXAMETHASONE SODIUM PHOSPHATE 4 MG: 4 INJECTION, SOLUTION INTRAMUSCULAR; INTRAVENOUS at 08:44

## 2024-01-10 RX ADMIN — MIDAZOLAM HYDROCHLORIDE 1 MG: 1 INJECTION, SOLUTION INTRAMUSCULAR; INTRAVENOUS at 09:55

## 2024-01-10 RX ADMIN — FAMOTIDINE 20 MG: 10 INJECTION, SOLUTION INTRAVENOUS at 08:44

## 2024-01-10 RX ADMIN — SUGAMMADEX 200 MG: 100 INJECTION, SOLUTION INTRAVENOUS at 12:02

## 2024-01-10 RX ADMIN — LIDOCAINE HYDROCHLORIDE 100 MG: 20 INJECTION, SOLUTION INFILTRATION; PERINEURAL at 11:02

## 2024-01-10 RX ADMIN — SODIUM CHLORIDE, POTASSIUM CHLORIDE, SODIUM LACTATE AND CALCIUM CHLORIDE 9 ML/HR: 600; 310; 30; 20 INJECTION, SOLUTION INTRAVENOUS at 08:43

## 2024-01-10 RX ADMIN — ROCURONIUM BROMIDE 40 MG: 10 INJECTION, SOLUTION INTRAVENOUS at 11:02

## 2024-01-10 RX ADMIN — ONDANSETRON 4 MG: 2 INJECTION INTRAMUSCULAR; INTRAVENOUS at 08:44

## 2024-01-10 RX ADMIN — BUPIVACAINE HYDROCHLORIDE 1 ML: 5 INJECTION, SOLUTION PERINEURAL at 10:05

## 2024-01-10 RX ADMIN — MORPHINE SULFATE 150 MCG: 0.5 INJECTION, SOLUTION EPIDURAL; INTRATHECAL; INTRAVENOUS at 10:05

## 2024-01-10 RX ADMIN — HYDROMORPHONE HYDROCHLORIDE 0.5 MG: 1 INJECTION, SOLUTION INTRAMUSCULAR; INTRAVENOUS; SUBCUTANEOUS at 12:38

## 2024-01-10 RX ADMIN — SIMETHICONE 80 MG: 80 TABLET, CHEWABLE ORAL at 21:34

## 2024-01-10 RX ADMIN — GABAPENTIN 100 MG: 100 CAPSULE ORAL at 21:34

## 2024-01-10 RX ADMIN — SIMETHICONE 80 MG: 80 TABLET, CHEWABLE ORAL at 17:16

## 2024-01-10 RX ADMIN — SODIUM CHLORIDE, POTASSIUM CHLORIDE, SODIUM LACTATE AND CALCIUM CHLORIDE 100 ML/HR: 600; 310; 30; 20 INJECTION, SOLUTION INTRAVENOUS at 13:51

## 2024-01-10 RX ADMIN — DIPHENHYDRAMINE HYDROCHLORIDE 25 MG: 25 CAPSULE ORAL at 21:34

## 2024-01-10 RX ADMIN — ALBUMIN HUMAN: 0.05 INJECTION, SOLUTION INTRAVENOUS at 11:22

## 2024-01-10 RX ADMIN — EPHEDRINE SULFATE 10 MG: 50 INJECTION INTRAVENOUS at 11:27

## 2024-01-10 RX ADMIN — PROPOFOL INJECTABLE EMULSION 180 MG: 10 INJECTION, EMULSION INTRAVENOUS at 11:02

## 2024-01-10 RX ADMIN — MIDAZOLAM HYDROCHLORIDE 1 MG: 1 INJECTION, SOLUTION INTRAMUSCULAR; INTRAVENOUS at 09:54

## 2024-01-10 RX ADMIN — DOCUSATE SODIUM 100 MG: 100 CAPSULE, LIQUID FILLED ORAL at 21:34

## 2024-01-10 RX ADMIN — KETOROLAC TROMETHAMINE 15 MG: 30 INJECTION, SOLUTION INTRAMUSCULAR; INTRAVENOUS at 18:09

## 2024-01-10 RX ADMIN — CEFAZOLIN SODIUM 2 G: 2 SOLUTION INTRAVENOUS at 11:15

## 2024-01-10 RX ADMIN — OXYCODONE AND ACETAMINOPHEN 1 TABLET: 5; 325 TABLET ORAL at 21:35

## 2024-01-10 RX ADMIN — OXYCODONE AND ACETAMINOPHEN 1 TABLET: 5; 325 TABLET ORAL at 17:15

## 2024-01-10 RX ADMIN — MAGNESIUM SULFATE HEPTAHYDRATE 2 G/HR: 40 INJECTION, SOLUTION INTRAVENOUS at 11:33

## 2024-01-10 RX ADMIN — FENTANYL CITRATE 50 MCG: 50 INJECTION, SOLUTION INTRAMUSCULAR; INTRAVENOUS at 11:02

## 2024-01-10 NOTE — ANESTHESIA PROCEDURE NOTES
Spinal Block    Pre-sedation assessment completed: 1/10/2024 9:53 AM    Patient reassessed immediately prior to procedure    Patient location during procedure: pre-op  Start Time: 1/10/2024 9:59 AM  Stop Time: 1/10/2024 10:05 AM  Indication:at surgeon's request and post-op pain management  Performed By  CRNA/CAA: Dalila Travis CRNASRNA: Damian Prado SRNA  Preanesthetic Checklist  Completed: patient identified, IV checked, site marked, risks and benefits discussed, surgical consent, monitors and equipment checked, pre-op evaluation and timeout performed  Spinal Block Prep:  Patient Position:sitting  Sterile Tech:cap, gloves, mask and sterile barriers  Prep:Chloraprep  Patient Monitoring:blood pressure monitoring, continuous pulse oximetry and EKG    Spinal Block Procedure  Approach:midline  Guidance:landmark technique and palpation technique  Location:L3-L4  Needle Type:Sprotte  Needle Gauge:25 G  Placement of Spinal needle event:cerebrospinal fluid aspirated  Paresthesia: no  Fluid Appearance:clear  Medications: bupivacaine (MARCAINE) injection 0.5% - Injection   1 mL - 1/10/2024 10:05:00 AM   Post Assessment  Patient Tolerance:patient tolerated the procedure well with no apparent complications  Complications no

## 2024-01-10 NOTE — ANESTHESIA PREPROCEDURE EVALUATION
Anesthesia Evaluation     Patient summary reviewed and Nursing notes reviewed   no history of anesthetic complications:   NPO Solid Status: > 8 hours  NPO Liquid Status: > 2 hours           Airway   Mallampati: I  TM distance: >3 FB  Neck ROM: full  No difficulty expected  Dental - normal exam     Pulmonary - normal exam    breath sounds clear to auscultation  (+) a smoker (vape and cigarettes), asthma (childhood only),  Cardiovascular - normal exam  Exercise tolerance: excellent (>7 METS)    Rhythm: regular  Rate: normal    (+) hypertension (no meds)      Neuro/Psych  (+) psychiatric history Anxiety and Depression  GI/Hepatic/Renal/Endo    (+) hepatitis C, liver disease (Hep C), renal disease- stones    Musculoskeletal (-) negative ROS    Abdominal  - normal exam   Substance History   (+) alcohol use (socially)     OB/GYN          Other            Phys Exam Other: Water finished at 0630 (liquid IV)              Anesthesia Plan    ASA 2     general and ITN     intravenous induction     Anesthetic plan, risks, benefits, and alternatives have been provided, discussed and informed consent has been obtained with: patient.  Pre-procedure education provided  Use of blood products discussed with patient  Consented to blood products.    Plan discussed with CRNA.      CODE STATUS:    Level Of Support Discussed With: Patient  Code Status (Patient has no pulse and is not breathing): CPR (Attempt to Resuscitate)  Medical Interventions (Patient has pulse or is breathing): Full Support

## 2024-01-10 NOTE — PLAN OF CARE
"Goal Outcome Evaluation:           Progress: improving  Outcome Evaluation: VSS, intermittently tachycardic during periods of movement or pt verbalizing anxiousness or pain. Pt states pain is adequately controlled with PO percocet and IV toradol. Refusing ETCO2 at 1715. Stewart cath in place, adequate output. Eating and drinking w/o n/v. x3 abdominal lap sites c/d/i with skin glue. Some mild bruising noted at edges. Will update pt with POC.           Problem: Adult Inpatient Plan of Care  Goal: Plan of Care Review  Outcome: Ongoing, Progressing  Flowsheets  Taken 1/10/2024 1758 by Luma Broussard RN  Progress: improving  Outcome Evaluation: VSS, intermittently tachycardic during periods of movement or pt verbalizing anxiousness or pain. Pt states pain is adequately controlled with PO percocet and IV toradol. Refusing ETCO2 at 1715. Stewart cath in place, adequate output. Eating and drinking w/o n/v. x3 abdominal lap sites c/d/i with skin glue. Some mild bruising noted at edges. Will update pt with POC.  Taken 1/10/2024 1306 by Barb Resendez RN  Plan of Care Reviewed With: patient  Goal: Patient-Specific Goal (Individualized)  Outcome: Ongoing, Progressing  Flowsheets (Taken 1/10/2024 1758)  Individualized Care Needs: Pt likes ice water  Anxieties, Fears or Concerns: Pt states she is an anxious person and has a hard time \"sitting still\"  Goal: Absence of Hospital-Acquired Illness or Injury  Outcome: Ongoing, Progressing  Intervention: Identify and Manage Fall Risk  Recent Flowsheet Documentation  Taken 1/10/2024 1337 by Luma Broussard, RN  Safety Promotion/Fall Prevention: safety round/check completed  Intervention: Prevent and Manage VTE (Venous Thromboembolism) Risk  Recent Flowsheet Documentation  Taken 1/10/2024 1715 by Luma Broussard, RN  Activity Management: ambulated in room  Taken 1/10/2024 1337 by Luma Broussard, RN  Activity Management: activity encouraged  Goal: Optimal Comfort and Wellbeing  Outcome: " Ongoing, Progressing  Intervention: Monitor Pain and Promote Comfort  Recent Flowsheet Documentation  Taken 1/10/2024 1715 by Luma Broussard, RN  Pain Management Interventions: see MAR  Intervention: Provide Person-Centered Care  Recent Flowsheet Documentation  Taken 1/10/2024 1337 by Luma Broussard, RN  Trust Relationship/Rapport:   care explained   choices provided  Goal: Readiness for Transition of Care  Outcome: Ongoing, Progressing     Problem: Asthma Comorbidity  Goal: Maintenance of Asthma Control  Outcome: Ongoing, Progressing     Problem: Behavioral Health Comorbidity  Goal: Maintenance of Behavioral Health Symptom Control  Outcome: Ongoing, Progressing

## 2024-01-10 NOTE — OP NOTE
Subjective     Date of Service:  01/10/24  Time of Service:  12:10 EST    Surgical Staff: Surgeon(s) and Role:     * Ronald Land DO - Primary   Additional Staff: Syed Sims CFA  Assistant: Syed Sims CSA was responsible for performing the following activities: Retraction, Suction, Irrigation, Suturing, Closing, Placing Dressing, and Held/Positioned Camera and their skilled assistance was necessary for the success of this case.    Pre-operative diagnosis(es): Pre-Op Diagnosis Codes:     * Pelvic pain [R10.2]     * Endometriosis [N80.9]     Post-operative diagnosis(es): Post-Op Diagnosis Codes:     * Pelvic pain [R10.2]     * Endometriosis [N80.9]   Procedure(s): Procedure(s):  TOTAL LAPAROSCOPIC HYSTERECTOMY, CYSTOSCOPY     Antibiotics: cefazolin (Ancef)ordered on call to OR     Anesthesia: Type: General with Epidural  ASA:  II     Objective      Operative findings: Normal appearing ovaries and uterus. Endometriosis noted posterior cul de sac. Fallopian tubes absent consistent with her surgical history    Specimens removed: ID Type Source Tests Collected by Time   A (Not marked as sent) :  Tissue Uterus with Cervix TISSUE PATHOLOGY EXAM Ronald Land DO 1/10/2024 1135      Fluid Intake: 700mL   Output: Documented Output  Est. Blood Loss 50mL  Urine Output 400mL      I/O this shift:  In: 250 [IV Piggyback:250]  Out: 750 [Urine:700; Blood:50]     Blood products used: No   Drains: Urethral Catheter Non-latex 16 Fr. (Active)      Implant Information: Implant Name Type Inv. Item Serial No.  Lot No. LRB No. Used Action   RELOAD SUT ENDOSTITCH [id number] ABS SZ2/0 20CM GRN - [id number] Implant RELOAD SUT ENDOSTITCH [id number] ABS SZ2/0 20CM GRN  [id number] N/A 1 Implanted      Complications: None apparent   Intraoperative consult(s): none   Condition: stable   Disposition: to PACU [facility]         Assessment & Plan          FINDINGS: EUA: 6 wk anteverted uterus.  Non-palpable ovaries. No nodularity.     Procedure: Sterile speculum revealed  small but normal appearing cervix. Laparoscopy performed and abdominopelvic survey revealed normal appearing bowel, bladder, uterus,and ovaries; see above for more details. Uterus, cervix, b/l fallopian tubes removed w/o difficulty and hemostatic at conclusion of case. Cystoscopy revealed normal appearing bladder lining with b/l ureteral jets. Vaginal sweep performed revealing no items retained in the vagina.       PROCEDURE: After informed consent was obtained and the patient's pregnancy test was negative, she was taken to the operating room where general endotracheal anesthesia was administered without difficulty.  The patient was placed in dorsolithotomy position in yellowfin stirrups, prepared and draped in the usual sterile fashion. СВЕТЛАНА-KOH placed in standard fashion.   The laparoscopic port sites were anesthetized with intradermal injection of 0.25% Marcaine. There were 3 ports placed, including a 5-mm umbilical port,  5-mm right and 10mm left lower quadrant ports.   The 5-mm port was placed in the umbilicus without difficulty and a 5-mm scope was placed. There were omental adhesions underlying the previous  Scar; lysis of adhesions performed in standard fasion. The remaining ports were placed under direct laparoscopic guidance. The pelvis and the abdomen was explored with the laparoscope, with findings as noted. Attention was then turned to the laparoscopic hysterectomy. The mesovarium was skeletonized to the level of the bladder. A bladder flap was mobilized by dividing the round ligaments using the bipolar cutting forceps, and the peritoneum on the vesicouterine fold was incised to mobilize the bladder. Once the  colpotomy ring was skeletonized and in position, the uterine arteries were sealed using the bipolar forceps at the level of the colpotomy ring. The vagina was transected using harmonic, resulting in separation of the  uterus and cervix. The uterus then delivered through the vagina, and the fundus used as a pneumo-occluder to maintain pneumoperitoneum. The vaginal vault was closed with 0-Vicryl using the Endo-Stitch device. The abdomen was irrigated, and excellent hemostasis was noted . The insufflation pressure was reduced, and no evidence of bleeding was seen.     The ports were removed under direct laparoscopic guidance, and the pneumoperitoneum was released. The skin was closed using dermal 4-0 monocryl stitches & dermabond. The final sponge, needle, and instrument counts were correct at the completion of the procedure. Cystoscopy was performed that showed a normal appearing bladder with no evidence of bladder injury. Brisk bilateral jets were noted from both ureters.     All instruments were then removed from the bladder and vagina, and the patient was taken out of dorsolithotomy position and awakened from general anesthesia. The patient was taken to the PACU in stable condition. Sponge, lap, needle and instrument counts were correct times two.     Ronald Land DO

## 2024-01-10 NOTE — ANESTHESIA PROCEDURE NOTES
Airway  Urgency: elective    Date/Time: 1/10/2024 11:03 AM  Airway not difficult    General Information and Staff    Patient location during procedure: OR  CRNA/CAA: Frankie Morales SRNA    Indications and Patient Condition  Indications for airway management: airway protection    Preoxygenated: yes  MILS maintained throughout  Mask difficulty assessment: 1 - vent by mask    Final Airway Details  Final airway type: endotracheal airway      Successful airway: ETT  Cuffed: yes   Successful intubation technique: direct laryngoscopy  Facilitating devices/methods: intubating stylet  Endotracheal tube insertion site: oral  Blade: Beny  Blade size: 3  ETT size (mm): 7.0  Cormack-Lehane Classification: grade I - full view of glottis  Placement verified by: chest auscultation and capnometry   Cuff volume (mL): 8  Measured from: lips  ETT/EBT  to lips (cm): 21  Number of attempts at approach: 1  Assessment: lips, teeth, and gum same as pre-op and atraumatic intubation

## 2024-01-10 NOTE — INTERVAL H&P NOTE
H&P reviewed. The patient was examined and there are no changes to the H&P.    Vitals:    01/10/24 0824   BP: 144/95   Pulse: 86   Resp: 18   Temp: 98.3 °F (36.8 °C)   SpO2: 96%

## 2024-01-10 NOTE — ANESTHESIA POSTPROCEDURE EVALUATION
Patient: Delaney Duron    Procedure Summary       Date: 01/10/24 Room / Location:  [facility] OR 3 /  [facility]    Anesthesia Start: 1053 Anesthesia Stop: 1216    Procedure: TOTAL LAPAROSCOPIC HYSTERECTOMY, CYSTOSCOPY (Abdomen) Diagnosis:       Pelvic pain      Endometriosis      (Pelvic pain [R10.2])      (Endometriosis [N80.9])    Surgeons: Ronald Land DO Provider: Cassie Bernabe CRNA    Anesthesia Type: general, ITN ASA Status: 2            Anesthesia Type: general, ITN    Vitals  Vitals Value Taken Time   /72 01/10/24 1305   Temp 97.7 °F (36.5 °C) 01/10/24 1225   Pulse 91 01/10/24 1307   Resp 12 01/10/24 1300   SpO2 97 % 01/10/24 1307   Vitals shown include unfiled device data.        Post Anesthesia Care and Evaluation    Patient location during evaluation: bedside  Patient participation: complete - patient participated  Level of consciousness: awake and alert  Pain score: 0  Pain management: adequate    Airway patency: patent  Anesthetic complications: No anesthetic complications  PONV Status: none  Cardiovascular status: acceptable  Respiratory status: acceptable  Hydration status: acceptable  No anesthesia care post op

## 2024-01-11 VITALS
RESPIRATION RATE: 18 BRPM | BODY MASS INDEX: 21.86 KG/M2 | TEMPERATURE: 97.8 F | SYSTOLIC BLOOD PRESSURE: 133 MMHG | HEART RATE: 78 BPM | DIASTOLIC BLOOD PRESSURE: 72 MMHG | OXYGEN SATURATION: 100 % | WEIGHT: 139.6 LBS

## 2024-01-11 LAB
DEPRECATED RDW RBC AUTO: 55.6 FL (ref 37–54)
ERYTHROCYTE [DISTWIDTH] IN BLOOD BY AUTOMATED COUNT: 16.2 % (ref 12.3–15.4)
HCT VFR BLD AUTO: 39.6 % (ref 34–46.6)
HGB BLD-MCNC: 13.2 G/DL (ref 12–15.9)
MCH RBC QN AUTO: 31.2 PG (ref 26.6–33)
MCHC RBC AUTO-ENTMCNC: 33.3 G/DL (ref 31.5–35.7)
MCV RBC AUTO: 93.6 FL (ref 79–97)
PLATELET # BLD AUTO: 221 10*3/MM3 (ref 140–450)
PMV BLD AUTO: 10 FL (ref 6–12)
RBC # BLD AUTO: 4.23 10*6/MM3 (ref 3.77–5.28)
WBC NRBC COR # BLD AUTO: 7.51 10*3/MM3 (ref 3.4–10.8)

## 2024-01-11 PROCEDURE — G0378 HOSPITAL OBSERVATION PER HR: HCPCS

## 2024-01-11 PROCEDURE — 85027 COMPLETE CBC AUTOMATED: CPT | Performed by: STUDENT IN AN ORGANIZED HEALTH CARE EDUCATION/TRAINING PROGRAM

## 2024-01-11 PROCEDURE — 99024 POSTOP FOLLOW-UP VISIT: CPT | Performed by: NURSE PRACTITIONER

## 2024-01-11 RX ORDER — NALOXONE HYDROCHLORIDE 4 MG/.1ML
SPRAY NASAL
Qty: 2 EACH | Refills: 0 | Status: SHIPPED | OUTPATIENT
Start: 2024-01-11

## 2024-01-11 RX ORDER — PSEUDOEPHEDRINE HCL 30 MG
100 TABLET ORAL 2 TIMES DAILY
Qty: 60 CAPSULE | Refills: 0 | Status: SHIPPED | OUTPATIENT
Start: 2024-01-11

## 2024-01-11 RX ORDER — OXYCODONE HYDROCHLORIDE AND ACETAMINOPHEN 5; 325 MG/1; MG/1
2 TABLET ORAL EVERY 4 HOURS PRN
Qty: 15 TABLET | Refills: 0 | Status: SHIPPED | OUTPATIENT
Start: 2024-01-11 | End: 2024-01-14

## 2024-01-11 RX ADMIN — GABAPENTIN 100 MG: 100 CAPSULE ORAL at 09:01

## 2024-01-11 RX ADMIN — OXYCODONE AND ACETAMINOPHEN 1 TABLET: 5; 325 TABLET ORAL at 06:41

## 2024-01-11 RX ADMIN — DOCUSATE SODIUM 100 MG: 100 CAPSULE, LIQUID FILLED ORAL at 09:01

## 2024-01-11 RX ADMIN — SIMETHICONE 80 MG: 80 TABLET, CHEWABLE ORAL at 09:01

## 2024-01-11 RX ADMIN — SIMETHICONE 80 MG: 80 TABLET, CHEWABLE ORAL at 06:44

## 2024-01-11 RX ADMIN — MELOXICAM 7.5 MG: 7.5 TABLET ORAL at 09:01

## 2024-01-11 NOTE — DISCHARGE SUMMARY
Date of Admission: 1/10/2024  8:15 AM      Date of Discharge:  1/11/2024    Admitting Service: [facility]    Admission Diagnosis: Pelvic pain and Endometriosis   Discharge Diagnosis: Pelvic pain and  Endometriosis     Presenting Problem/History of Present Illness  Pelvic pain [R10.2]  Endometriosis [N80.9]     Hospital Course  Patient is a 33 y.o. female presented with a history of chronic pelvic pain and irregular bleeding. She has a known history of endometriosis. She desires definitive management of her pain. She underwent a a total laparoscopic hysterectomy and cystoscopy. She has progressed well over the course of the night. She is tolerating a regular diet. She is voiding without difficulty. She reports flatus. She is ambulating. Her vital signs are stable. She is afebrile. She reports she is ready for discharge. She has two week appt scheduled. Her discharge medications, warn s/s, recovery expectations and limitations have been disc with the patient in detail.     Procedures Performed  Procedure(s):  TOTAL LAPAROSCOPIC HYSTERECTOMY, CYSTOSCOPY       Consults:   Consults       No orders found for last 30 day(s).            Pertinent Test Results: labs: CBC    Condition on Discharge:  Satisfactory     Vital Signs  Temp:  [97.7 °F (36.5 °C)-98.1 °F (36.7 °C)] 98.1 °F (36.7 °C)  Heart Rate:  [] 82  Resp:  [12-22] 20  BP: (109-149)/(59-99) 137/69    Physical Exam:   General Appearance: alert, pleasant, appears stated age, and cooperative  Lungs: clear to auscultation, respirations regular, respirations even, and respirations unlabored  Abdomen: normal bowel sounds, soft non-tender, and Incisions C/D/I x 3  Extremities: moves extremities well, no edema, no tenderness, and Karen's sign negative  Skin: no bleeding, bruising or rash  Psych: normal    Discharge Disposition  Home or Self Care    Discharge Medications     Discharge Medications        New Medications        Instructions Start Date   docusate sodium 100 MG  capsule   100 mg, Oral, 2 Times Daily      naloxone 4 MG/0.1ML nasal spray  Commonly known as: NARCAN   Call 911. Don't prime. [place] in 1 nostril for overdose. Repeat in 2-3 minutes in other nostril if no or minimal breathing/responsiveness.      oxyCODONE-acetaminophen 5-325 MG per tablet  Commonly known as: PERCOCET   2 tablets, Oral, Every 4 Hours PRN             Continue These Medications        Instructions Start Date   indomethacin 25 MG capsule  Commonly known as: INDOCIN   25 mg, Oral, 3 Times Daily PRN      Risaquad-2 capsule capsule   1 capsule, Oral, Daily             Stop These Medications      Relugolix-Estradiol-Norethind 40-1-0.5 MG tablet              Discharge Diet:   Diet Instructions       Diet: Regular/House Diet; Regular Texture (IDDSI 7); Thin (IDDSI 0)      Discharge Diet: Regular/House Diet    Texture: Regular Texture (IDDSI 7)    Fluid Consistency: Thin (IDDSI 0)            Activity at Discharge:   Activity Instructions       Driving Restrictions      No driving x 2 weeks or while taking narcotics    Type of Restriction: Driving    Driving Restrictions: No Driving While Taking Narcotics    Lifting Restrictions      Do not lift more than 5-10 #    Type of Restriction: Lifting    Lifting Restrictions: Avoid Straining to Lift    Length of Lifting Restriction: Do not lift more than 5-10#    Pelvic Rest      Sexual Activity Restrictions      Nothing in the vagina x 6 weeks    Type of Restriction: Sex    Explain Sexual Activity Restrictions: Nothing in the vagina x 6 weeks            Follow-up Appointments  Future Appointments   Date Time Provider Department Center   1/23/2024  1:15 PM Ronald Land DO [facility]     Additional Instructions for the Follow-ups that You Need to Schedule       Discharge Follow-up with Specialty: [facility]; 2 Weeks   As directed      Specialty: [facility]   Follow Up: 2 Weeks                Test Results Pending at Discharge  Pending Labs       Order Current Status     Tissue Pathology Exam In process             Sherly France, APRN  01/11/24  08:41 EST    Time: Discharge 20 min

## 2024-01-11 NOTE — CASE MANAGEMENT/SOCIAL WORK
Case Management Discharge Note      Final Note: Discharged home.         Selected Continued Care - Discharged on 1/11/2024 Admission date: 1/10/2024 - Discharge disposition: Home or Self Care      Destination    No services have been selected for the patient.                Durable Medical Equipment    No services have been selected for the patient.                Dialysis/Infusion    No services have been selected for the patient.                Home Medical Care    No services have been selected for the patient.                Therapy    No services have been selected for the patient.                Community Resources    No services have been selected for the patient.                Community & DME    No services have been selected for the patient.                         Final Discharge Disposition Code: 01 - home or self-care

## 2024-01-11 NOTE — PLAN OF CARE
Problem: Adult Inpatient Plan of Care  Goal: Plan of Care Review  Outcome: Met  Goal: Patient-Specific Goal (Individualized)  Outcome: Met  Goal: Absence of Hospital-Acquired Illness or Injury  Outcome: Met  Intervention: Identify and Manage Fall Risk  Recent Flowsheet Documentation  Taken 1/11/2024 0734 by Kathy Patel RN  Safety Promotion/Fall Prevention: safety round/check completed  Intervention: Prevent and Manage VTE (Venous Thromboembolism) Risk  Recent Flowsheet Documentation  Taken 1/11/2024 0734 by Kathy Patel RN  Activity Management: up ad mihir  Goal: Optimal Comfort and Wellbeing  Outcome: Met  Intervention: Monitor Pain and Promote Comfort  Recent Flowsheet Documentation  Taken 1/11/2024 0734 by Kathy Patel RN  Pain Management Interventions: no interventions per patient request  Intervention: Provide Person-Centered Care  Recent Flowsheet Documentation  Taken 1/11/2024 0734 by Kathy Patel RN  Trust Relationship/Rapport:   care explained   choices provided  Goal: Readiness for Transition of Care  Outcome: Met   Goal Outcome Evaluation:

## 2024-01-12 LAB
LAB AP CASE REPORT: NORMAL
PATH REPORT.FINAL DX SPEC: NORMAL
PATH REPORT.GROSS SPEC: NORMAL

## 2024-01-15 ENCOUNTER — TELEPHONE (OUTPATIENT)
Dept: OBSTETRICS AND GYNECOLOGY | Facility: CLINIC | Age: 34
End: 2024-01-15
Payer: MEDICAID

## 2024-01-15 NOTE — TELEPHONE ENCOUNTER
Patient is calling asking if you can send her in a prescription that isn't as strong as what was prescribed from her procedure. Please advise.

## 2024-01-18 ENCOUNTER — OFFICE VISIT (OUTPATIENT)
Dept: OBSTETRICS AND GYNECOLOGY | Facility: CLINIC | Age: 34
End: 2024-01-18
Payer: MEDICAID

## 2024-01-18 VITALS
DIASTOLIC BLOOD PRESSURE: 80 MMHG | HEIGHT: 67 IN | SYSTOLIC BLOOD PRESSURE: 132 MMHG | WEIGHT: 146 LBS | BODY MASS INDEX: 22.91 KG/M2

## 2024-01-18 DIAGNOSIS — F19.11 H/O DRUG ABUSE: ICD-10-CM

## 2024-01-18 DIAGNOSIS — N80.9 ENDOMETRIOSIS: ICD-10-CM

## 2024-01-18 DIAGNOSIS — R10.2 PELVIC PAIN: Primary | ICD-10-CM

## 2024-01-18 PROCEDURE — 99213 OFFICE O/P EST LOW 20 MIN: CPT | Performed by: STUDENT IN AN ORGANIZED HEALTH CARE EDUCATION/TRAINING PROGRAM

## 2024-01-18 NOTE — PROGRESS NOTES
"Had a TLH with me on . Did lift a couch at home to clean; felt abdominal pain and spotting. Wanted to be seen faster due to concern. Denies dysuria, no intercourse. Denies F/C or N&V.Pain controlled at times     Initial Appt:   Delaney Duron is a 33 y.o.  whose LMP is Patient's last menstrual period was 2024.. She has the Nexplanon, it expires in 10/23. She has chronic pelvic pain, feels like that has been ongoing for several years.  She is having irregular bleeding as well. She is asking for the Nexplanon to be removed today. She was recently seen in the ER at [facility] d/t pelvic pain and was told she has a (R) adnexal cyst measuring 4.3cm. She is interested in having a partial hysterctomy. She is in a relationship and needs contraception. Her partner is considering a vasectomy.     She reports she has been clean x 5 years and is a healthy relationship currently. Reports she is very happy with her life right now. She does not desire any more children.     Disc with patient that d/t insurance restrictions, she can either address her ovarian cyst or have her nexplanon removed today. Pt desires to address her ovarian cyst.           Review of Systems   Pelvic pain; Spotting       Objective      /80   Ht 170.2 cm (67\")   Wt 66.2 kg (146 lb)   LMP 2024   Breastfeeding No   BMI 22.87 kg/m²     Physical Exam  Vitals: VSS; AF    General Appearance:  Awake. Alert. Well developed. Well nourished. In no acute distress.      Lungs:  ° Clear to auscultation bilaterally without wheezes, rales or rhonchi.  Cardiovascular:  ° System: RRR, no murmur, consistent with normal pregnancy.  Abdomen:  Visual Inspection: ° Abdomen was normal on visual inspection.  Palpation: ° Abdomen was soft. ° Abdominal non-tender.    Genitalia:  External: ° Vulva was normal. ° Labia showed no abnormalities. ° Clitoris was normal. ° Kenova's gland was normal. ° Bartholin's gland was normal. ° Genitalia " exhibited no lesion.  Vagina: ° Mucosa was normal. ° Not tender. ° No vaginal mass was observed.  Cervix: ° Absent; No active bleeding   Uterus: ° Absent       Neurological:  ° Oriented to time, place, and person.  Trocar sites x3; healing    Assessment  Diagnoses and all orders for this visit:    1. H/O drug abuse- clean x 7 years, UDS neg (Primary)    2. Endometriosis    3. Pelvic pain    Discussed ADL's after Gyn surgery   Reassurance given   UA with culture sent   F/u as scheduled     Ronald Land DO  1/18/2024   13:44 EST

## 2024-01-20 LAB
BACTERIA UR CULT: NORMAL
BACTERIA UR CULT: NORMAL

## 2024-01-22 ENCOUNTER — TELEPHONE (OUTPATIENT)
Dept: OBSTETRICS AND GYNECOLOGY | Facility: CLINIC | Age: 34
End: 2024-01-22

## 2024-01-22 ENCOUNTER — TELEPHONE (OUTPATIENT)
Dept: OBSTETRICS AND GYNECOLOGY | Facility: CLINIC | Age: 34
End: 2024-01-22
Payer: MEDICAID

## 2024-01-22 RX ORDER — METRONIDAZOLE 500 MG/1
500 TABLET ORAL 2 TIMES DAILY
Qty: 14 TABLET | Refills: 0 | Status: SHIPPED | OUTPATIENT
Start: 2024-01-22 | End: 2024-01-29

## 2024-01-22 NOTE — TELEPHONE ENCOUNTER
Provider:     Caller: JOSHUA KAISER    Relationship to Patient: SELF    Pharmacy:     Phone Number: [phone]    Reason for Call: RETURNING CALL    When was the patient last seen: 01.10.24    PATIENT IS RETURNING CALL FROM 01.22.24    PATIENT CAN BE REACHED .[phone]    THANK YOU

## 2024-01-23 ENCOUNTER — OFFICE VISIT (OUTPATIENT)
Dept: OBSTETRICS AND GYNECOLOGY | Facility: CLINIC | Age: 34
End: 2024-01-23
Payer: MEDICAID

## 2024-01-23 VITALS
BODY MASS INDEX: 22.44 KG/M2 | WEIGHT: 143 LBS | SYSTOLIC BLOOD PRESSURE: 110 MMHG | DIASTOLIC BLOOD PRESSURE: 60 MMHG | HEIGHT: 67 IN

## 2024-01-23 DIAGNOSIS — Z48.89 POSTOPERATIVE VISIT: ICD-10-CM

## 2024-01-23 DIAGNOSIS — Z90.710 S/P LAPAROSCOPIC HYSTERECTOMY: Primary | ICD-10-CM

## 2024-01-23 PROCEDURE — 99024 POSTOP FOLLOW-UP VISIT: CPT | Performed by: STUDENT IN AN ORGANIZED HEALTH CARE EDUCATION/TRAINING PROGRAM

## 2024-01-23 NOTE — PROGRESS NOTES
"Had a TLH with me on . . Denies F/C or N&V.Pain controlled. 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     Initial Appt:   Delaney Duron is a 33 y.o.  whose LMP is Patient's last menstrual period was 2024.. She has the Nexplanon, it expires in 10/23. She has chronic pelvic pain, feels like that has been ongoing for several years.  She is having irregular bleeding as well. She is asking for the Nexplanon to be removed today. She was recently seen in the ER at [facility] d/t pelvic pain and was told she has a (R) adnexal cyst measuring 4.3cm. She is interested in having a partial hysterctomy. She is in a relationship and needs contraception. Her partner is considering a vasectomy.     She reports she has been clean x 5 years and is a healthy relationship currently. Reports she is very happy with her life right now. She does not desire any more children.     Disc with patient that d/t insurance restrictions, she can either address her ovarian cyst or have her nexplanon removed today. Pt desires to address her ovarian cyst.           Review of Systems   Pelvic pain; Spotting       Objective      /60   Ht 170.2 cm (67\")   Wt 64.9 kg (143 lb)   LMP 2024   Breastfeeding No   BMI 22.40 kg/m²     Physical Exam  Vitals: VSS; AF    General Appearance:  Awake. Alert. Well developed. Well nourished. In no acute distress.      Lungs:  ° Clear to auscultation bilaterally without wheezes, rales or rhonchi.  Cardiovascular:  ° System: RRR, no murmur, consistent with normal pregnancy.  Abdomen:  Visual Inspection: ° Abdomen was normal on visual inspection.  Palpation: ° Abdomen was soft. ° Abdominal non-tender.    Genitalia:  Defer until next visit       Neurological:  ° Oriented to time, place, and person.  Trocar sites x3; healing; glue removed from LLQ ; steri strips given     Assessment  Diagnoses and all orders for " this visit:    1. S/P laparoscopic hysterectomy (Primary)    2. Postoperative visit    Discussed ADL's after Gyn surgery   Reassurance given   Work note   F/u as scheduled     Ronald Land DO  1/23/2024   13:30 EST

## 2024-05-09 ENCOUNTER — OFFICE VISIT (OUTPATIENT)
Dept: OBSTETRICS AND GYNECOLOGY | Facility: CLINIC | Age: 34
End: 2024-05-09
Payer: MEDICAID

## 2024-05-09 VITALS
SYSTOLIC BLOOD PRESSURE: 144 MMHG | DIASTOLIC BLOOD PRESSURE: 82 MMHG | WEIGHT: 145.4 LBS | HEIGHT: 67 IN | BODY MASS INDEX: 22.82 KG/M2

## 2024-05-09 DIAGNOSIS — N80.9 ENDOMETRIOSIS: ICD-10-CM

## 2024-05-09 DIAGNOSIS — F19.11 H/O DRUG ABUSE: ICD-10-CM

## 2024-05-09 DIAGNOSIS — Z76.89 ENCOUNTER TO ESTABLISH CARE: ICD-10-CM

## 2024-05-09 DIAGNOSIS — I10 ESSENTIAL HYPERTENSION: Primary | ICD-10-CM

## 2024-05-09 LAB — TSH SERPL DL<=0.005 MIU/L-ACNC: 0.85 UIU/ML (ref 0.27–4.2)

## 2024-05-09 RX ORDER — LOSARTAN POTASSIUM 25 MG/1
25 TABLET ORAL DAILY
Qty: 60 TABLET | Refills: 1 | Status: SHIPPED | OUTPATIENT
Start: 2024-05-09

## 2024-05-24 ENCOUNTER — APPOINTMENT (OUTPATIENT)
Dept: CT IMAGING | Facility: HOSPITAL | Age: 34
End: 2024-05-24
Payer: MEDICAID

## 2024-05-24 ENCOUNTER — HOSPITAL ENCOUNTER (EMERGENCY)
Facility: HOSPITAL | Age: 34
Discharge: HOME | End: 2024-05-24
Attending: EMERGENCY MEDICINE
Payer: MEDICAID

## 2024-05-24 VITALS
DIASTOLIC BLOOD PRESSURE: 88 MMHG | OXYGEN SATURATION: 97 % | HEART RATE: 88 BPM | HEIGHT: 67 IN | SYSTOLIC BLOOD PRESSURE: 133 MMHG | RESPIRATION RATE: 15 BRPM | BODY MASS INDEX: 22.84 KG/M2 | WEIGHT: 145.5 LBS | TEMPERATURE: 98.7 F

## 2024-05-24 DIAGNOSIS — K52.9 ENTERITIS: Primary | ICD-10-CM

## 2024-05-24 LAB
ALBUMIN SERPL-MCNC: 4.6 G/DL (ref 3.5–5.2)
ALBUMIN/GLOB SERPL: 1.4 G/DL
ALP SERPL-CCNC: 63 U/L (ref 39–117)
ALT SERPL W P-5'-P-CCNC: 25 U/L (ref 1–33)
ANION GAP SERPL CALCULATED.3IONS-SCNC: 9.4 MMOL/L (ref 5–15)
AST SERPL-CCNC: 31 U/L (ref 1–32)
B-HCG UR QL: NEGATIVE
BASOPHILS # BLD AUTO: 0.05 10*3/MM3 (ref 0–0.2)
BASOPHILS NFR BLD AUTO: 0.7 % (ref 0–1.5)
BILIRUB SERPL-MCNC: 0.2 MG/DL (ref 0–1.2)
BILIRUB UR QL STRIP: NEGATIVE
BUN SERPL-MCNC: 13 MG/DL (ref 6–20)
BUN/CREAT SERPL: 18.1 (ref 7–25)
CALCIUM SPEC-SCNC: 9.4 MG/DL (ref 8.6–10.5)
CHLORIDE SERPL-SCNC: 101 MMOL/L (ref 98–107)
CLARITY UR: CLEAR
CO2 SERPL-SCNC: 28.6 MMOL/L (ref 22–29)
COLOR UR: YELLOW
CREAT SERPL-MCNC: 0.72 MG/DL (ref 0.57–1)
DEPRECATED RDW RBC AUTO: 43.4 FL (ref 37–54)
EGFRCR SERPLBLD CKD-EPI 2021: 114.8 ML/MIN/1.73
EOSINOPHIL # BLD AUTO: 0.11 10*3/MM3 (ref 0–0.4)
EOSINOPHIL NFR BLD AUTO: 1.6 % (ref 0.3–6.2)
ERYTHROCYTE [DISTWIDTH] IN BLOOD BY AUTOMATED COUNT: 12.7 % (ref 12.3–15.4)
GLOBULIN UR ELPH-MCNC: 3.4 GM/DL
GLUCOSE SERPL-MCNC: 88 MG/DL (ref 65–99)
GLUCOSE UR STRIP-MCNC: NEGATIVE MG/DL
HCT VFR BLD AUTO: 43.5 % (ref 34–46.6)
HGB BLD-MCNC: 14.4 G/DL (ref 12–15.9)
HGB UR QL STRIP.AUTO: NEGATIVE
IMM GRANULOCYTES # BLD AUTO: 0.02 10*3/MM3 (ref 0–0.05)
IMM GRANULOCYTES NFR BLD AUTO: 0.3 % (ref 0–0.5)
KETONES UR QL STRIP: NEGATIVE
LEUKOCYTE ESTERASE UR QL STRIP.AUTO: NEGATIVE
LYMPHOCYTES # BLD AUTO: 1.81 10*3/MM3 (ref 0.7–3.1)
LYMPHOCYTES NFR BLD AUTO: 27.1 % (ref 19.6–45.3)
MCH RBC QN AUTO: 31 PG (ref 26.6–33)
MCHC RBC AUTO-ENTMCNC: 33.1 G/DL (ref 31.5–35.7)
MCV RBC AUTO: 93.8 FL (ref 79–97)
MONOCYTES # BLD AUTO: 0.63 10*3/MM3 (ref 0.1–0.9)
MONOCYTES NFR BLD AUTO: 9.4 % (ref 5–12)
NEUTROPHILS NFR BLD AUTO: 4.05 10*3/MM3 (ref 1.7–7)
NEUTROPHILS NFR BLD AUTO: 60.9 % (ref 42.7–76)
NITRITE UR QL STRIP: NEGATIVE
NRBC BLD AUTO-RTO: 0 /100 WBC (ref 0–0.2)
PH UR STRIP.AUTO: 6 [PH] (ref 4.5–8)
PLATELET # BLD AUTO: 302 10*3/MM3 (ref 140–450)
PMV BLD AUTO: 9.9 FL (ref 6–12)
POTASSIUM SERPL-SCNC: 4.7 MMOL/L (ref 3.5–5.2)
PROT SERPL-MCNC: 8 G/DL (ref 6–8.5)
PROT UR QL STRIP: NEGATIVE
RBC # BLD AUTO: 4.64 10*6/MM3 (ref 3.77–5.28)
SODIUM SERPL-SCNC: 139 MMOL/L (ref 136–145)
SP GR UR STRIP: 1.02 (ref 1–1.03)
UROBILINOGEN UR QL STRIP: NORMAL
WBC NRBC COR # BLD AUTO: 6.67 10*3/MM3 (ref 3.4–10.8)

## 2024-05-24 PROCEDURE — 25510000001 IOPAMIDOL PER 1 ML: Performed by: EMERGENCY MEDICINE

## 2024-05-24 PROCEDURE — 80053 COMPREHEN METABOLIC PANEL: CPT | Performed by: EMERGENCY MEDICINE

## 2024-05-24 PROCEDURE — 81003 URINALYSIS AUTO W/O SCOPE: CPT | Performed by: EMERGENCY MEDICINE

## 2024-05-24 PROCEDURE — 85025 COMPLETE CBC W/AUTO DIFF WBC: CPT | Performed by: EMERGENCY MEDICINE

## 2024-05-24 PROCEDURE — 87491 CHLMYD TRACH DNA AMP PROBE: CPT | Performed by: EMERGENCY MEDICINE

## 2024-05-24 PROCEDURE — 87591 N.GONORRHOEAE DNA AMP PROB: CPT | Performed by: EMERGENCY MEDICINE

## 2024-05-24 PROCEDURE — 99285 EMERGENCY DEPT VISIT HI MDM: CPT

## 2024-05-24 PROCEDURE — 74177 CT ABD & PELVIS W/CONTRAST: CPT

## 2024-05-24 PROCEDURE — 81025 URINE PREGNANCY TEST: CPT | Performed by: EMERGENCY MEDICINE

## 2024-05-24 RX ADMIN — IOPAMIDOL 100 ML: 755 INJECTION, SOLUTION INTRAVENOUS at 18:40

## 2024-05-24 NOTE — ED PROVIDER NOTES
Subjective   History of Present Illness    Chief complaint: Abdominal pain    Location: Infraumbilical    Quality/Severity: Moderate    Timing/Onset/Duration: X 1 week    Modifying Factors: Nothing makes it better    Associated Symptoms: No headache.  No fever chills or cough.  No sore throat earache or nasal congestion.  No chest pain or shortness of breath.  No diarrhea or burning on urination.  No nausea or vomiting.  The patient has nonbloody diarrhea.  No vaginal bleeding or discharges.    Narrative: This 33-year-old presents with abdominal pain for the last 2 days and nausea.  Sustained a laceration to the left ankle with a meghan blade at work.  She is requesting a tetanus shot.    PCP:Robert Stovall APRN      Review of Systems   HENT:  Negative for congestion, ear pain and sore throat.    Respiratory:  Negative for cough and shortness of breath.    Cardiovascular:  Negative for chest pain.   Gastrointestinal:  Positive for abdominal pain and diarrhea. Negative for constipation, nausea and vomiting.   Genitourinary:  Negative for difficulty urinating.   Musculoskeletal:  Negative for back pain and neck pain.   Skin:  Negative for rash.   Neurological:  Negative for headaches.         Past Medical History:   Diagnosis Date    Anxiety     Asthma     as a teen with exercise    Gestational diabetes 04/26/2019    resolved after birth of child    Hepatitis C     Kidney stone     Substance abuse     clean 61/2 yrs       No Known Allergies    Past Surgical History:   Procedure Laterality Date    CONTRACEPTIVE CAPSULE REMOVAL Left 4/24/2023    Procedure: REMOVAL OF CONTRACEPTIVE CAPSULE;  Surgeon: Ronald Land DO;  Location: [facility] OR;  Service: Obstetrics/Gynecology;  Laterality: Left;  removed intact    SALPINGECTOMY N/A 4/24/2023    Procedure: Bilateral salpingectomy laparoscopic, Right Cystectomy;  Surgeon: Ronald Land DO;  Location: [facility] OR;  Service: Obstetrics/Gynecology;  Laterality: N/A;     TOTAL LAPAROSCOPIC HYSTERECTOMY N/A 1/10/2024    Procedure: TOTAL LAPAROSCOPIC HYSTERECTOMY, CYSTOSCOPY;  Surgeon: Ronald Land DO;  Location: [facility];  Service: Obstetrics/Gynecology;  Laterality: N/A;    VAGINAL DELIVERY      x2       Family History   Problem Relation Age of Onset    No Known Problems Father     No Known Problems Mother        Social History     Socioeconomic History    Marital status: Single   Tobacco Use    Smoking status: Every Day     Current packs/day: 0.00     Types: Cigarettes     Last attempt to quit: 2018     Years since quittin.4    Smokeless tobacco: Never   Vaping Use    Vaping status: Some Days    Substances: CBD   Substance and Sexual Activity    Alcohol use: Yes     Comment: occasional    Drug use: Not Currently     Types: Heroin, Methamphetamines     Comment: 6 years clean - marijuana daily    Sexual activity: Yes     Partners: Male     Comment: last intercose 3 days ago           Objective   Physical Exam  Vitals (The temperature is 98.7 °F, pulse 89, respirations 15, /88, room air pulse ox 100%.) and nursing note reviewed.   Constitutional:       Appearance: She is well-developed.   HENT:      Head: Normocephalic and atraumatic.   Cardiovascular:      Rate and Rhythm: Normal rate and regular rhythm.      Heart sounds: Normal heart sounds. No murmur heard.     No friction rub. No gallop.   Pulmonary:      Effort: Pulmonary effort is normal.      Breath sounds: Normal breath sounds.   Abdominal:      General: Abdomen is flat. Bowel sounds are increased.      Palpations: Abdomen is soft.      Tenderness: There is abdominal tenderness (Mild infraumbilical tenderness). There is no guarding or rebound.      Hernia: No hernia is present.   Skin:     General: Skin is warm and dry.      Findings: No rash.      Comments: There is a 1 cm abrasion noted to the back of the left ankle.  The capillary refill is less than 2 seconds.  The sensation is intact.  There is a  normal range of motion noted.  There is no joint laxity noted.  There is a 2+ dorsalis pedis pulse.  There is no active bleeding.  The wound edges are closed.  No foreign body can be palpated.     Neurological:      General: No focal deficit present.      Mental Status: She is alert and oriented to person, place, and time.         Procedures           ED Course  ED Course as of 05/24/24 1908   Fri May 24, 2024   1826 The CMP is unremarkable. [RC]   1827 The CBC is unremarkable. [RC]   1827 The urine hCG is negative. [RC]   1827 The urinalysis is unremarkable. [RC]      ED Course User Index  [RC] Del Bradley MD      16:47 EDT, 05/24/24:  The patient states that she scratched it on a meghan blade.  She is not concerned with foreign body in the wound.  She is merely here to get a tetanus shot for the wound.                           20:24 EDT, 05/24/24:  The patient was reassessed.  She has no new complaints.  Her vital signs reviewed and are stable.  Abdominal exam: Mild infraumbilical abdominal tenderness, no rebound, no guarding, no masses, positive bowel sounds.  Soft, mild infraumbilical tenderness, no rebound, no guarding, no masses, positive bowel sounds.    20:24 EDT, 05/24/24:  Patient's diagnosis of enteritis was discussed with her.  The patient should drink plenty of fluids.  She should take alternate Tylenol as needed as directed for pain.  The patient should follow-up with Dr. Arzate next week.  She should return to the emergency department if there is worsening pain, bloody stools, worsening way at all.  All the patient questions were answered she will be discharged in good condition.  The patient was advised that if her diarrhea remains chronic, she may need follow-up with GI specialist and that Dr. Arzate could help her to get referral to a GI specialist if the diarrhea does not clear.            Medical Decision Making      Final diagnoses:   Enteritis       ED Disposition  ED Disposition        None            No follow-up provider specified.       Medication List      No changes were made to your prescriptions during this visit.            Del Bradley MD  05/24/24 [id number]

## 2024-05-25 NOTE — DISCHARGE INSTRUCTIONS
Drink plenty of fluids.  Take Motrin or Tylenol as needed as directed for pain.  Follow-up with Dr. Arzate next week.  Return to the emergency department if there is worsening pain, bloody diarrhea, worse in any way at all.

## 2024-05-28 LAB
C TRACH RRNA SPEC QL NAA+PROBE: NEGATIVE
N GONORRHOEA RRNA SPEC QL NAA+PROBE: NEGATIVE

## 2024-09-04 RX ORDER — LOSARTAN POTASSIUM 25 MG/1
25 TABLET ORAL DAILY
Qty: 60 TABLET | Refills: 1 | Status: SHIPPED | OUTPATIENT
Start: 2024-09-04

## 2024-12-10 ENCOUNTER — OFFICE VISIT (OUTPATIENT)
Dept: OBSTETRICS AND GYNECOLOGY | Facility: CLINIC | Age: 34
End: 2024-12-10
Payer: MEDICAID

## 2024-12-10 VITALS
BODY MASS INDEX: 22.76 KG/M2 | HEIGHT: 67 IN | SYSTOLIC BLOOD PRESSURE: 126 MMHG | DIASTOLIC BLOOD PRESSURE: 88 MMHG | WEIGHT: 145 LBS

## 2024-12-10 DIAGNOSIS — Z13.89 SCREENING FOR GENITOURINARY CONDITION: ICD-10-CM

## 2024-12-10 DIAGNOSIS — N63.0 BREAST LUMP IN FEMALE: Primary | Chronic | ICD-10-CM

## 2024-12-10 DIAGNOSIS — Z80.3 FAMILY HISTORY OF BREAST CANCER: ICD-10-CM

## 2024-12-10 DIAGNOSIS — Z91.89 INCREASED RISK OF BREAST CANCER: ICD-10-CM

## 2024-12-10 NOTE — PROGRESS NOTES
"Subjective     Chief Complaint   Patient presents with    Breast Problem     Noticed 3 months ago she had a spot in her right breast , Right nipple is tender to touch        Delaney Duron is a 34 y.o.  whose LMP is Patient's last menstrual period was 2024.. She presents with c/o a (R) breast lump. She first noticed the lump approx 3 months ago while doing a SBE. She feels like the lump is grown and is now painful. Denies nipple drainage or skin changes.     She is S/P hysterectomy.     She is adopted but does know she has a family h/o breast cancer with her birth mother and birth sister. She thinks her birth sister's age was in early 30s.  Uncertain about genetics.         HPI    HPI    The following portions of the patient's history were reviewed and updated as appropriate:vital signs, allergies, current medications, past medical history, past social history, past surgical history, and problem list      Review of Systems     Review of Systems   Constitutional: Negative.    Genitourinary:  Positive for breast lump and breast pain.       Objective      /88   Ht 170.2 cm (67\")   Wt 65.8 kg (145 lb)   LMP 2024   BMI 22.71 kg/m²     Physical Exam    Physical Exam  Vitals and nursing note reviewed.   Constitutional:       Appearance: Normal appearance.   Chest:   Breasts:     Right: Mass present. No swelling, bleeding or inverted nipple.      Left: No swelling, bleeding, inverted nipple or mass.          Comments: Yellow- Fibrocystic changes  Red- lump   Musculoskeletal:         General: Normal range of motion.   Skin:     General: Skin is warm and dry.   Neurological:      General: No focal deficit present.      Mental Status: She is alert and oriented to person, place, and time.   Psychiatric:         Mood and Affect: Mood normal.         Behavior: Behavior normal.         Lab Review   Labs: No data reviewed     Imaging   No data reviewed    Assessment  Diagnoses and all orders for this " visit:    1. Screening for genitourinary condition (Primary)  -     POC Urinalysis Dipstick    2. Breast lump in female  -     Mammo Diagnostic Digital Tomosynthesis Bilateral With CAD; Future  -     US Breast Right Complete; Future        Additional Assessment:   Breast lump  Family h/o breast cancer   Fibrocystic breast     Plan     Breast lump- Check DX MM and US  Family h/o breast cancer- info on genetic screening provided. TC lifetime risk 24%. Offered ref to breast surgery, pt desires [facility] ref.   Fibrocystic breast- Disc decrease caffeine and smoking. Pt is working on sobriety currently and is using both in assistance with staying clean.     RTO PRN and for RACHEL France, APRN  12/10/2024

## 2024-12-17 ENCOUNTER — TELEPHONE (OUTPATIENT)
Dept: SURGERY | Facility: CLINIC | Age: 34
End: 2024-12-17
Payer: MEDICAID

## 2024-12-17 NOTE — TELEPHONE ENCOUNTER
Rcm for pt to call back to [facility] new pt appt with mirella oconnor

## 2024-12-23 ENCOUNTER — TELEPHONE (OUTPATIENT)
Dept: OBSTETRICS AND GYNECOLOGY | Facility: CLINIC | Age: 34
End: 2024-12-23

## 2024-12-23 RX ORDER — ESCITALOPRAM OXALATE 10 MG/1
10 TABLET ORAL DAILY
Qty: 30 TABLET | Refills: 2 | Status: SHIPPED | OUTPATIENT
Start: 2024-12-23 | End: 2025-12-23

## 2024-12-23 NOTE — TELEPHONE ENCOUNTER
Caller: Delaney Duron    Relationship: Self    Best call back number: [phone]  CALL ANYTIME. IT IS OKAY TO LVM.    Requested Prescriptions:   Requested Prescriptions      No prescriptions requested or ordered in this encounter       MEDICATION FOR ANXIETY, DEPRESSION AND STRESS.     Pharmacy where request should be sent: [id number] - [place], [place] - [address] AT [facility] & [address] - [phone]  - [phone] FX     Last office visit with prescribing clinician: 12/10/2024   Last telemedicine visit with prescribing clinician: Visit date not found   Next office visit with prescribing clinician: Visit date not found     Additional details provided by patient:  PATIENT CALLED REQUESTING TO SPEAK WITH NURSE TO DISCUSS IF MEDICATION CAN BE SENT FOR ANXIETY. LAST SAW ANITA 12/10/24 FOR BREAST LUMP.     PATIENT IS TRYING TO GET APPT SCHEDULED WITH A MENTAL HEALTH SPECIALIST. REQUESTING MEDICATION TO HELP UNTIL APPT WITH MENTAL HEALTH.     Does the patient have less than a 3 day supply:  [] Yes  [] No -NA RX REQUEST    Would you like a call back once the refill request has been completed: [x] Yes [] No YES, TO DISCUSS WHAT MEDICATION IS BEING SENT OR IF APPT IS NEEDED.    If the office needs to give you a call back, can they leave a voicemail: [x] Yes [] No    Adrián Leija Rep   12/23/24 15:05 EST       .”

## 2024-12-26 DIAGNOSIS — F48.9 MENTAL HEALTH PROBLEM: Primary | ICD-10-CM

## 2025-05-01 ENCOUNTER — TELEPHONE (OUTPATIENT)
Dept: OBSTETRICS AND GYNECOLOGY | Facility: CLINIC | Age: 35
End: 2025-05-01

## 2025-05-01 RX ORDER — SULFAMETHOXAZOLE AND TRIMETHOPRIM 800; 160 MG/1; MG/1
1 TABLET ORAL 2 TIMES DAILY
Qty: 6 TABLET | Refills: 0 | Status: SHIPPED | OUTPATIENT
Start: 2025-05-01

## 2025-05-01 NOTE — TELEPHONE ENCOUNTER
Caller: Delaney Duron    Relationship: Self    Best call back number: [phone]     Requested Prescriptions:   Requested Prescriptions      No prescriptions requested or ordered in this encounter      PRESCRIPTION TO TREAT UTI    Pharmacy where request should be sent: [id number] - [place], [place] - [address] AT [facility] & [address] - [phone]  - [phone] FX     Last office visit with prescribing clinician: 12/10/2024   Last telemedicine visit with prescribing clinician: Visit date not found   Next office visit with prescribing clinician: Visit date not found     Additional details provided by patient: PATIENT HAS HAD FREQUENT AND BURNING WITH URINATION FOR AROUND ONE WEEK. HAS TRIED PROBIOTICS AND CRANBERRY JUICE WITH NO RELIEF.     PATIENT IS REQUESTING PRESCRIPTION INSTEAD OF COMING INTO OFFICE. PLEASE CALL IF URINE SAMPLE IS NEEDED. LAST SAW JOE HOWARD APRN ON 12/10/24 FOR BREAST CONCERN. HUB SCHEDULED ANNUAL FOR 05/06/25.    Does the patient have less than a 3 day supply:  [] Yes  [] No-NA RX REQUEST     Would you like a call back once the refill request has been completed: [x] Yes [] No IF APPT IS NEEDED    If the office needs to give you a call back, can they leave a voicemail: [x] Yes [] No    Adrián Leija Rep   05/01/25 08:20 EDT

## 2025-05-12 ENCOUNTER — OFFICE VISIT (OUTPATIENT)
Dept: OBSTETRICS AND GYNECOLOGY | Facility: CLINIC | Age: 35
End: 2025-05-12
Payer: MEDICAID

## 2025-05-12 VITALS
DIASTOLIC BLOOD PRESSURE: 70 MMHG | SYSTOLIC BLOOD PRESSURE: 110 MMHG | WEIGHT: 150 LBS | BODY MASS INDEX: 23.54 KG/M2 | HEIGHT: 67 IN

## 2025-05-12 DIAGNOSIS — N39.3 STRESS INCONTINENCE IN FEMALE: Primary | ICD-10-CM

## 2025-05-12 DIAGNOSIS — Z90.710 S/P LAPAROSCOPIC HYSTERECTOMY: ICD-10-CM

## 2025-05-12 DIAGNOSIS — Z01.419 SMEAR, VAGINAL, AS PART OF ROUTINE GYNECOLOGICAL EXAMINATION: ICD-10-CM

## 2025-05-12 DIAGNOSIS — Z11.51 SPECIAL SCREENING EXAMINATION FOR HUMAN PAPILLOMAVIRUS (HPV): ICD-10-CM

## 2025-05-12 DIAGNOSIS — R76.8 HCV ANTIBODY POSITIVE: ICD-10-CM

## 2025-05-12 DIAGNOSIS — F19.11 H/O DRUG ABUSE: ICD-10-CM

## 2025-05-12 DIAGNOSIS — Z12.72 SMEAR, VAGINAL, AS PART OF ROUTINE GYNECOLOGICAL EXAMINATION: ICD-10-CM

## 2025-05-12 DIAGNOSIS — Z80.3 FAMILY HISTORY OF BREAST CANCER: ICD-10-CM

## 2025-05-12 DIAGNOSIS — Z01.419 ROUTINE GYNECOLOGICAL EXAMINATION: ICD-10-CM

## 2025-05-12 PROBLEM — R10.2 PELVIC PAIN: Status: RESOLVED | Noted: 2020-07-09 | Resolved: 2025-05-12

## 2025-05-12 PROBLEM — Z48.89 POSTOPERATIVE VISIT: Status: RESOLVED | Noted: 2024-01-23 | Resolved: 2025-05-12

## 2025-05-12 PROBLEM — N63.0 BREAST LUMP IN FEMALE: Chronic | Status: RESOLVED | Noted: 2024-12-10 | Resolved: 2025-05-12

## 2025-05-12 PROBLEM — N80.9 ENDOMETRIOSIS: Status: RESOLVED | Noted: 2024-01-04 | Resolved: 2025-05-12

## 2025-05-12 RX ORDER — HYDROXYZINE PAMOATE 25 MG/1
CAPSULE ORAL
COMMUNITY
Start: 2025-03-09

## 2025-05-12 RX ORDER — FOLIC ACID 1 MG/1
TABLET ORAL
COMMUNITY
Start: 2025-03-07 | End: 2025-05-12

## 2025-05-12 RX ORDER — MIRTAZAPINE 15 MG/1
15 TABLET, FILM COATED ORAL
COMMUNITY
Start: 2025-04-24 | End: 2025-05-12

## 2025-05-12 RX ORDER — ONDANSETRON 4 MG/1
TABLET, ORALLY DISINTEGRATING ORAL
COMMUNITY
Start: 2025-03-09 | End: 2025-05-12

## 2025-05-12 RX ORDER — FLUTICASONE PROPIONATE 50 MCG
SPRAY, SUSPENSION (ML) NASAL
COMMUNITY
Start: 2025-03-12 | End: 2025-05-12

## 2025-05-12 RX ORDER — TRAZODONE HYDROCHLORIDE 50 MG/1
TABLET ORAL
COMMUNITY
Start: 2025-03-09 | End: 2025-05-12

## 2025-05-12 RX ORDER — BUPROPION HYDROCHLORIDE 150 MG/1
1 TABLET ORAL DAILY
COMMUNITY
Start: 2025-04-30

## 2025-05-12 RX ORDER — CETIRIZINE HYDROCHLORIDE 10 MG/1
TABLET ORAL
COMMUNITY
Start: 2025-03-12

## 2025-05-12 RX ORDER — CARIPRAZINE 3 MG/1
1 CAPSULE, GELATIN COATED ORAL DAILY
COMMUNITY
Start: 2025-04-23

## 2025-05-12 RX ORDER — LANOLIN ALCOHOL/MO/W.PET/CERES
CREAM (GRAM) TOPICAL
COMMUNITY
Start: 2025-03-07 | End: 2025-05-12

## 2025-05-12 RX ORDER — IBUPROFEN 400 MG/1
TABLET, FILM COATED ORAL
COMMUNITY
Start: 2025-03-12

## 2025-05-12 RX ORDER — NALTREXONE HYDROCHLORIDE 50 MG/1
TABLET, FILM COATED ORAL
COMMUNITY
Start: 2025-03-12 | End: 2025-05-12

## 2025-05-12 RX ORDER — MULTIVITAMIN WITH FOLIC ACID 400 MCG
TABLET ORAL
COMMUNITY
Start: 2025-03-07

## 2025-05-12 RX ORDER — PROPRANOLOL HYDROCHLORIDE 10 MG/1
10 TABLET ORAL 2 TIMES DAILY PRN
COMMUNITY
Start: 2025-05-06

## 2025-05-12 RX ORDER — CLONIDINE HYDROCHLORIDE 0.1 MG/1
TABLET ORAL
COMMUNITY
Start: 2025-03-09 | End: 2025-05-12

## 2025-05-12 RX ORDER — DICYCLOMINE HYDROCHLORIDE 10 MG/1
CAPSULE ORAL
COMMUNITY
Start: 2025-03-09 | End: 2025-05-12

## 2025-05-12 NOTE — PROGRESS NOTES
GYN Annual Exam     CC- Here for annual exam.     Delaney Duron is a 34 y.o. female new patient who presents for annual well woman exam. Periods are absent s/p TLH with me.     Has noted HARJIT which has increased since TLH; bothersome and desires Tx options     Also noted to be TC lifetime risk 24%. Offered ref to breast surgery ; agrees today     OB History          5    Para   3    Term   3            AB   2    Living   3         SAB        IAB   1    Ectopic        Molar        Multiple   0    Live Births   2          Obstetric Comments   3/2012-  term male 6.15#  2014- VIP  2015- SAB no D&C  2017-  term female 6.13#                   Menarche:   Current contraception: status post hysterectomy  History of abnormal Pap smear: no  History of abnormal mammogram: no  Family history of uterine, colon or ovarian cancer: adopted  Family history of breast cancer: yes - Adopted but he rbirth mother and birth sister breast cancer   H/o STDs:   Last pap:Dec 2018   Gardasil:missed  ALEJANDRO: none    Health Maintenance   Topic Date Due    Annual Gynecologic Pelvic and Breast Exam  Never done    Hepatitis B (1 of 3 - 19+ 3-dose series) Never done    Pneumococcal Vaccine 0-49 (1 of 2 - PCV) Never done    ANNUAL PHYSICAL  Never done    COVID-19 Vaccine (3 - - season) 2024    INFLUENZA VACCINE  2025    TDAP/TD VACCINES (3 - Td or Tdap) 2031    HEPATITIS C SCREENING  Completed       Past Medical History:   Diagnosis Date    Anxiety     Asthma     as a teen with exercise    Gestational diabetes 2019    resolved after birth of child    Hepatitis C     Kidney stone     Substance abuse     clean 61/2 yrs       Past Surgical History:   Procedure Laterality Date    CONTRACEPTIVE CAPSULE REMOVAL Left 2023    Procedure: REMOVAL OF CONTRACEPTIVE CAPSULE;  Surgeon: Ronald Land DO;  Location: [facility] OR;  Service: Obstetrics/Gynecology;  Laterality: Left;  removed intact     "SALPINGECTOMY N/A 4/24/2023    Procedure: Bilateral salpingectomy laparoscopic, Right Cystectomy;  Surgeon: Ronald Land DO;  Location: [facility] OR;  Service: Obstetrics/Gynecology;  Laterality: N/A;    TOTAL LAPAROSCOPIC HYSTERECTOMY N/A 1/10/2024    Procedure: TOTAL LAPAROSCOPIC HYSTERECTOMY, CYSTOSCOPY;  Surgeon: Ronald Land DO;  Location: [facility] OR;  Service: Obstetrics/Gynecology;  Laterality: N/A;    VAGINAL DELIVERY      x2         Current Outpatient Medications:     buPROPion XL (WELLBUTRIN XL) 150 MG 24 hr tablet, Take 1 tablet by mouth Daily., Disp: , Rfl:     cetirizine (zyrTEC) 10 MG tablet, , Disp: , Rfl:     hydrOXYzine pamoate (VISTARIL) 25 MG capsule, , Disp: , Rfl:     ibuprofen (ADVIL,MOTRIN) 400 MG tablet, , Disp: , Rfl:     multivitamin with minerals tablet tablet, , Disp: , Rfl:     propranolol (INDERAL) 10 MG tablet, Take 1 tablet by mouth 2 (Two) Times a Day As Needed. for anxiety, Disp: , Rfl:     Vraylar 3 MG capsule capsule, Take 1 capsule by mouth Daily., Disp: , Rfl:     No Known Allergies    Social History     Tobacco Use    Smoking status: Some Days     Current packs/day: 0.50     Types: Cigarettes    Smokeless tobacco: Never   Vaping Use    Vaping status: Some Days    Substances: CBD   Substance Use Topics    Alcohol use: Yes     Comment: occasional    Drug use: Not Currently     Types: Heroin, Methamphetamines     Comment: 6 years clean - marijuana daily       Family History   Problem Relation Age of Onset    No Known Problems Mother     No Known Problems Father        Review of Systems  Breast tenderness at times   HARJIT+     /70   Ht 170.2 cm (67\")   Wt 68 kg (150 lb)   LMP 01/01/2024   Breastfeeding No   BMI 23.49 kg/m²     Physical Exam  Exam conducted with a chaperone present.   Constitutional:       Appearance: Normal appearance.   Cardiovascular:      Rate and Rhythm: Normal rate.   Pulmonary:      Effort: Pulmonary effort is normal.      Breath sounds: " Normal breath sounds.   Abdominal:      General: Abdomen is flat.      Palpations: Abdomen is soft.   Genitourinary:     General: Normal vulva.      Exam position: Lithotomy position.      Vagina: Normal.      Uterus: Absent.       Adnexa: Right adnexa normal and left adnexa normal.      Comments: Well healed Vaginal cuff   Skin:     General: Skin is warm and dry.   Neurological:      General: No focal deficit present.      Mental Status: She is alert and oriented to person, place, and time.   Psychiatric:         Mood and Affect: Mood normal.         Behavior: Behavior normal.              Assessment/Plan    1) GYN HM: pap/HPV/C/G/T  SBE demonstrated and encouraged.  2) STD screening: accepts Condoms encouraged.  3) Contraception: status post hysterectomy  4) Family Planning: family planning: childbearing completed  5) Diet and Exercise discussed  6) Smoking Status: yes I advised Delaney of the risks of continuing to use tobacco, and I provided her with tobacco cessation educational materials in the After Visit Summary.     During this visit, I spent 3 minutes counseling the patient regarding tobacco cessation.  7) Social: Doing well   8) MMG-  Will consult high risk breast with her TC score > 20 %   9)Follow up prn or 1 year       Diagnoses and all orders for this visit:    1. Routine gynecological examination (Primary)  -     POC Urinalysis Dipstick  -     Cancel: POC Pregnancy, Urine  -     IGP, Apt HPV,rfx 16 / 18,45  -     Chlamydia trachomatis, Neisseria gonorrhoeae, Trichomonas vaginalis, PCR - Urine, Urine, Random Void  -     Hepatitis B Surface Antigen  -     Hepatitis C Antibody  -     HIV-1 / O / 2 Ag / Antibody  -     HSV 1 & 2 - Specific Antibody, IgG  -     RPR, Rfx Qn RPR / Confirm TP    2. Smear, vaginal, as part of routine gynecological examination  -     POC Urinalysis Dipstick  -     Cancel: POC Pregnancy, Urine  -     IGP, Apt HPV,rfx 16 / 18,45    3. Special screening examination for human  papillomavirus (HPV)  -     POC Urinalysis Dipstick  -     Cancel: POC Pregnancy, Urine  -     IGP, Apt HPV,rfx 16 / 18,45    4. S/P laparoscopic hysterectomy      I spent 20 minutes on the separately reported service of HARJIT, Breast cancer Hx ( Consulted my partner and Dr Taveras ) . This time is not included in the time used to support the E/M service also reported today    Ronald Land DO  05/12/2025    13:45 EDT

## 2025-05-13 LAB
HBV SURFACE AG SERPL QL IA: NEGATIVE
HCV IGG SERPL QL IA: REACTIVE
HIV 1+2 AB+HIV1 P24 AG SERPL QL IA: NON REACTIVE
HSV1 IGG SERPL QL IA: NON REACTIVE
HSV2 IGG SERPL QL IA: REACTIVE
RPR SER QL: NON REACTIVE

## 2025-05-14 LAB
BACTERIA UR CULT: NORMAL
BACTERIA UR CULT: NORMAL
C TRACH RRNA SPEC QL NAA+PROBE: NEGATIVE
CYTOLOGIST CVX/VAG CYTO: NORMAL
CYTOLOGY CVX/VAG DOC CYTO: NORMAL
CYTOLOGY CVX/VAG DOC THIN PREP: NORMAL
DX ICD CODE: NORMAL
HPV I/H RISK 4 DNA CVX QL PROBE+SIG AMP: NEGATIVE
N GONORRHOEA RRNA SPEC QL NAA+PROBE: NEGATIVE
OTHER STN SPEC: NORMAL
SERVICE CMNT-IMP: NORMAL
STAT OF ADQ CVX/VAG CYTO-IMP: NORMAL
T VAGINALIS RRNA SPEC QL NAA+PROBE: NEGATIVE

## 2025-05-16 ENCOUNTER — TELEPHONE (OUTPATIENT)
Dept: OBSTETRICS AND GYNECOLOGY | Facility: CLINIC | Age: 35
End: 2025-05-16
Payer: MEDICAID

## 2025-05-16 NOTE — TELEPHONE ENCOUNTER
----- Message from Michelle CARABALLO sent at 5/13/2025  9:30 AM EDT -----  Regarding: FW: pt care  Urodynamics I am assuming  ----- Message -----  From: Ronald Land DO  Sent: 5/12/2025   1:51 PM EDT  To: Michelle Mejia  Subject: pt care                                          Can we schedule with liz for bothersome HARJIT

## (undated) DEVICE — TBG INSUFL W FLTR STRL

## (undated) DEVICE — SYR LL TP 10ML STRL

## (undated) DEVICE — TROCAR: Brand: KII SLEEVE

## (undated) DEVICE — OCCL COLPO PNEUMO  STRL

## (undated) DEVICE — STRIP,CLOSURE,WOUND,MEDI-STRIP,1/2X4: Brand: MEDLINE

## (undated) DEVICE — 2, DISPOSABLE SUCTION/IRRIGATOR WITH DISPOSABLE TIP: Brand: STRYKEFLOW

## (undated) DEVICE — ADHS SKIN PREMIERPRO EXOFIN TOPICAL HI/VISC .5ML

## (undated) DEVICE — LAPAROSCOPIC SMOKE FILTRATION SYSTEM: Brand: PALL LAPAROSHIELD® PLUS LAPAROSCOPIC SMOKE FILTRATION SYSTEM

## (undated) DEVICE — SUT MNCRYL 4/0 PS2 18 IN

## (undated) DEVICE — CONTAINER,SPECIMEN,OR STERILE,4OZ: Brand: MEDLINE

## (undated) DEVICE — DRAPE,UNDERBUTTOCKS,STERILE: Brand: MEDLINE

## (undated) DEVICE — SOL IRR H2O BTL 1000ML STRL

## (undated) DEVICE — HARMONIC ACE +7 LAPAROSCOPIC SHEARS ADVANCED HEMOSTASIS 5MM DIAMETER 36CM SHAFT LENGTH  FOR USE WITH GRAY HAND PIECE ONLY: Brand: HARMONIC ACE

## (undated) DEVICE — SUT VIC 0 CT1 36IN J946H

## (undated) DEVICE — MANIP UTER RUMI TP 6.7MMX8CM BLU

## (undated) DEVICE — APPL CHLORAPREP HI/LITE 26ML ORNG

## (undated) DEVICE — ENSEAL X1 TISSUE SEALER, CURVED JAW, 37 CM SHAFT LENGTH: Brand: ENSEAL

## (undated) DEVICE — LINER SURG CANSTR SXN S/RIGD 1500CC

## (undated) DEVICE — PK TLH 90

## (undated) DEVICE — GLV SURG SENSICARE PF POLYISPRN SZ8 LF

## (undated) DEVICE — SYR LUERLOK 50ML

## (undated) DEVICE — SUTURING DEVICE: Brand: ENDO STITCH

## (undated) DEVICE — TOWEL,OR,DSP,ST,BLUE,STD,4/PK,20PK/CS: Brand: MEDLINE

## (undated) DEVICE — NDL HYPO PRECISIONGLIDE REG 25G 1 1/2

## (undated) DEVICE — TOTAL TRAY, DB, 100% SILI FOLEY, 16FR 10: Brand: MEDLINE

## (undated) DEVICE — LAG GYN LAPAROSCOPY: Brand: MEDLINE INDUSTRIES, INC.

## (undated) DEVICE — GOWN,PREVENTION PLUS,XLNG/XXLARGE,STRL: Brand: MEDLINE

## (undated) DEVICE — LAPAROVUE VISIBILITY SYSTEM LAPAROSCOPIC SOLUTIONS: Brand: LAPAROVUE

## (undated) DEVICE — TROCAR: Brand: KII FIOS FIRST ENTRY

## (undated) DEVICE — METZENBAUM SCISSOR TIP, DISPOSABLE: Brand: RENEW

## (undated) DEVICE — LAPAROSCOPIC TROCAR SLEEVE/SINGLE USE: Brand: KII® OPTICAL ACCESS SYSTEM